# Patient Record
Sex: MALE | Race: OTHER | NOT HISPANIC OR LATINO | URBAN - METROPOLITAN AREA
[De-identification: names, ages, dates, MRNs, and addresses within clinical notes are randomized per-mention and may not be internally consistent; named-entity substitution may affect disease eponyms.]

---

## 2020-12-21 ENCOUNTER — INPATIENT (INPATIENT)
Facility: HOSPITAL | Age: 35
LOS: 0 days | Discharge: HOME | DRG: 164 | End: 2020-12-22
Payer: COMMERCIAL

## 2020-12-21 ENCOUNTER — RESULT REVIEW (OUTPATIENT)
Age: 35
End: 2020-12-21

## 2020-12-21 VITALS
RESPIRATION RATE: 26 BRPM | TEMPERATURE: 97 F | DIASTOLIC BLOOD PRESSURE: 78 MMHG | SYSTOLIC BLOOD PRESSURE: 130 MMHG | HEART RATE: 108 BPM | OXYGEN SATURATION: 97 % | WEIGHT: 309.97 LBS

## 2020-12-21 DIAGNOSIS — I26.99 OTHER PULMONARY EMBOLISM WITHOUT ACUTE COR PULMONALE: ICD-10-CM

## 2020-12-21 DIAGNOSIS — I82.451 ACUTE EMBOLISM AND THROMBOSIS OF RIGHT PERONEAL VEIN: ICD-10-CM

## 2020-12-21 DIAGNOSIS — I82.411 ACUTE EMBOLISM AND THROMBOSIS OF RIGHT FEMORAL VEIN: ICD-10-CM

## 2020-12-21 DIAGNOSIS — I82.431 ACUTE EMBOLISM AND THROMBOSIS OF RIGHT POPLITEAL VEIN: ICD-10-CM

## 2020-12-21 DIAGNOSIS — D68.51 ACTIVATED PROTEIN C RESISTANCE: ICD-10-CM

## 2020-12-21 DIAGNOSIS — R09.89 OTHER SPECIFIED SYMPTOMS AND SIGNS INVOLVING THE CIRCULATORY AND RESPIRATORY SYSTEMS: ICD-10-CM

## 2020-12-21 DIAGNOSIS — I50.811 ACUTE RIGHT HEART FAILURE: ICD-10-CM

## 2020-12-21 LAB
A1C WITH ESTIMATED AVERAGE GLUCOSE RESULT: 6.6 % — HIGH (ref 4–5.6)
ALBUMIN SERPL ELPH-MCNC: 3.6 G/DL — SIGNIFICANT CHANGE UP (ref 3.3–5)
ALBUMIN SERPL ELPH-MCNC: 3.7 G/DL — SIGNIFICANT CHANGE UP (ref 3.3–5)
ALP SERPL-CCNC: 70 U/L — SIGNIFICANT CHANGE UP (ref 40–120)
ALP SERPL-CCNC: 71 U/L — SIGNIFICANT CHANGE UP (ref 40–120)
ALT FLD-CCNC: 56 U/L — HIGH (ref 10–45)
ALT FLD-CCNC: SIGNIFICANT CHANGE UP U/L (ref 10–45)
ANION GAP SERPL CALC-SCNC: 13 MMOL/L — SIGNIFICANT CHANGE UP (ref 5–17)
ANION GAP SERPL CALC-SCNC: 14 MMOL/L — SIGNIFICANT CHANGE UP (ref 5–17)
APTT BLD: 131.1 SEC — CRITICAL HIGH (ref 27.5–35.5)
APTT BLD: 57.2 SEC — HIGH (ref 27.5–35.5)
APTT BLD: 59.6 SEC — HIGH (ref 27.5–35.5)
APTT BLD: 60.1 SEC — HIGH (ref 27.5–35.5)
APTT BLD: >200 SEC — CRITICAL HIGH (ref 27.5–35.5)
AST SERPL-CCNC: 22 U/L — SIGNIFICANT CHANGE UP (ref 10–40)
AST SERPL-CCNC: SIGNIFICANT CHANGE UP U/L (ref 10–40)
BILIRUB SERPL-MCNC: 0.5 MG/DL — SIGNIFICANT CHANGE UP (ref 0.2–1.2)
BILIRUB SERPL-MCNC: 0.5 MG/DL — SIGNIFICANT CHANGE UP (ref 0.2–1.2)
BUN SERPL-MCNC: 7 MG/DL — SIGNIFICANT CHANGE UP (ref 7–23)
BUN SERPL-MCNC: 8 MG/DL — SIGNIFICANT CHANGE UP (ref 7–23)
CALCIUM SERPL-MCNC: 8.6 MG/DL — SIGNIFICANT CHANGE UP (ref 8.4–10.5)
CALCIUM SERPL-MCNC: 8.9 MG/DL — SIGNIFICANT CHANGE UP (ref 8.4–10.5)
CHLORIDE SERPL-SCNC: 103 MMOL/L — SIGNIFICANT CHANGE UP (ref 96–108)
CHLORIDE SERPL-SCNC: 103 MMOL/L — SIGNIFICANT CHANGE UP (ref 96–108)
CO2 SERPL-SCNC: 20 MMOL/L — LOW (ref 22–31)
CO2 SERPL-SCNC: 21 MMOL/L — LOW (ref 22–31)
CREAT SERPL-MCNC: 0.68 MG/DL — SIGNIFICANT CHANGE UP (ref 0.5–1.3)
CREAT SERPL-MCNC: 0.68 MG/DL — SIGNIFICANT CHANGE UP (ref 0.5–1.3)
ESTIMATED AVERAGE GLUCOSE: 143 MG/DL — HIGH (ref 68–114)
GLUCOSE SERPL-MCNC: 145 MG/DL — HIGH (ref 70–99)
GLUCOSE SERPL-MCNC: 147 MG/DL — HIGH (ref 70–99)
HCT VFR BLD CALC: 41.8 % — SIGNIFICANT CHANGE UP (ref 39–50)
HGB BLD-MCNC: 13.4 G/DL — SIGNIFICANT CHANGE UP (ref 13–17)
INR BLD: 1.25 — HIGH (ref 0.88–1.16)
INR BLD: 1.34 — HIGH (ref 0.88–1.16)
MAGNESIUM SERPL-MCNC: 2 MG/DL — SIGNIFICANT CHANGE UP (ref 1.6–2.6)
MAGNESIUM SERPL-MCNC: 2 MG/DL — SIGNIFICANT CHANGE UP (ref 1.6–2.6)
MCHC RBC-ENTMCNC: 26.7 PG — LOW (ref 27–34)
MCHC RBC-ENTMCNC: 32.1 GM/DL — SIGNIFICANT CHANGE UP (ref 32–36)
MCV RBC AUTO: 83.3 FL — SIGNIFICANT CHANGE UP (ref 80–100)
NRBC # BLD: 0 /100 WBCS — SIGNIFICANT CHANGE UP (ref 0–0)
NT-PROBNP SERPL-SCNC: 1957 PG/ML — HIGH (ref 0–300)
PHOSPHATE SERPL-MCNC: 2.9 MG/DL — SIGNIFICANT CHANGE UP (ref 2.5–4.5)
PHOSPHATE SERPL-MCNC: 3.1 MG/DL — SIGNIFICANT CHANGE UP (ref 2.5–4.5)
PLATELET # BLD AUTO: 245 K/UL — SIGNIFICANT CHANGE UP (ref 150–400)
POTASSIUM SERPL-MCNC: 3.8 MMOL/L — SIGNIFICANT CHANGE UP (ref 3.5–5.3)
POTASSIUM SERPL-MCNC: SIGNIFICANT CHANGE UP MMOL/L (ref 3.5–5.3)
POTASSIUM SERPL-SCNC: 3.8 MMOL/L — SIGNIFICANT CHANGE UP (ref 3.5–5.3)
POTASSIUM SERPL-SCNC: SIGNIFICANT CHANGE UP MMOL/L (ref 3.5–5.3)
PROT SERPL-MCNC: 6.7 G/DL — SIGNIFICANT CHANGE UP (ref 6–8.3)
PROT SERPL-MCNC: 7.2 G/DL — SIGNIFICANT CHANGE UP (ref 6–8.3)
PROTHROM AB SERPL-ACNC: 14.8 SEC — HIGH (ref 10.6–13.6)
PROTHROM AB SERPL-ACNC: 15.9 SEC — HIGH (ref 10.6–13.6)
RBC # BLD: 5.02 M/UL — SIGNIFICANT CHANGE UP (ref 4.2–5.8)
RBC # FLD: 13 % — SIGNIFICANT CHANGE UP (ref 10.3–14.5)
SARS-COV-2 RNA SPEC QL NAA+PROBE: SIGNIFICANT CHANGE UP
SODIUM SERPL-SCNC: 137 MMOL/L — SIGNIFICANT CHANGE UP (ref 135–145)
SODIUM SERPL-SCNC: 137 MMOL/L — SIGNIFICANT CHANGE UP (ref 135–145)
TROPONIN T SERPL-MCNC: <0.01 NG/ML — SIGNIFICANT CHANGE UP (ref 0–0.01)
WBC # BLD: 14.27 K/UL — HIGH (ref 3.8–10.5)
WBC # FLD AUTO: 14.27 K/UL — HIGH (ref 3.8–10.5)

## 2020-12-21 PROCEDURE — 93306 TTE W/DOPPLER COMPLETE: CPT | Mod: 26

## 2020-12-21 PROCEDURE — 99223 1ST HOSP IP/OBS HIGH 75: CPT | Mod: GC

## 2020-12-21 PROCEDURE — 93308 TTE F-UP OR LMTD: CPT | Mod: 26,59

## 2020-12-21 PROCEDURE — 37184 PRIM ART M-THRMBC 1ST VSL: CPT

## 2020-12-21 PROCEDURE — 88304 TISSUE EXAM BY PATHOLOGIST: CPT | Mod: 26

## 2020-12-21 PROCEDURE — 93321 DOPPLER ECHO F-UP/LMTD STD: CPT | Mod: 26,59

## 2020-12-21 PROCEDURE — 75743 ARTERY X-RAYS LUNGS: CPT | Mod: 26,59

## 2020-12-21 PROCEDURE — 99233 SBSQ HOSP IP/OBS HIGH 50: CPT | Mod: GC

## 2020-12-21 PROCEDURE — 36014 PLACE CATHETER IN ARTERY: CPT | Mod: 59

## 2020-12-21 RX ORDER — HEPARIN SODIUM 5000 [USP'U]/ML
2400 INJECTION INTRAVENOUS; SUBCUTANEOUS
Qty: 25000 | Refills: 0 | Status: DISCONTINUED | OUTPATIENT
Start: 2020-12-21 | End: 2020-12-21

## 2020-12-21 RX ORDER — HEPARIN SODIUM 5000 [USP'U]/ML
2200 INJECTION INTRAVENOUS; SUBCUTANEOUS
Qty: 25000 | Refills: 0 | Status: DISCONTINUED | OUTPATIENT
Start: 2020-12-21 | End: 2020-12-21

## 2020-12-21 RX ORDER — INFLUENZA VIRUS VACCINE 15; 15; 15; 15 UG/.5ML; UG/.5ML; UG/.5ML; UG/.5ML
0.5 SUSPENSION INTRAMUSCULAR ONCE
Refills: 0 | Status: DISCONTINUED | OUTPATIENT
Start: 2020-12-21 | End: 2020-12-22

## 2020-12-21 RX ORDER — CHLORHEXIDINE GLUCONATE 213 G/1000ML
1 SOLUTION TOPICAL
Refills: 0 | Status: DISCONTINUED | OUTPATIENT
Start: 2020-12-21 | End: 2020-12-21

## 2020-12-21 RX ORDER — ACETAMINOPHEN 500 MG
650 TABLET ORAL ONCE
Refills: 0 | Status: COMPLETED | OUTPATIENT
Start: 2020-12-21 | End: 2020-12-21

## 2020-12-21 RX ORDER — HEPARIN SODIUM 5000 [USP'U]/ML
1800 INJECTION INTRAVENOUS; SUBCUTANEOUS
Qty: 25000 | Refills: 0 | Status: DISCONTINUED | OUTPATIENT
Start: 2020-12-21 | End: 2020-12-22

## 2020-12-21 RX ADMIN — Medication 650 MILLIGRAM(S): at 20:24

## 2020-12-21 RX ADMIN — Medication 650 MILLIGRAM(S): at 21:22

## 2020-12-21 RX ADMIN — HEPARIN SODIUM 22 UNIT(S)/HR: 5000 INJECTION INTRAVENOUS; SUBCUTANEOUS at 08:16

## 2020-12-21 RX ADMIN — HEPARIN SODIUM 22 UNIT(S)/HR: 5000 INJECTION INTRAVENOUS; SUBCUTANEOUS at 19:13

## 2020-12-21 NOTE — H&P ADULT - ASSESSMENT
35M PMH factor V Leiden mutation and PE, previously on Xarelto now off, presented to [place] with complaints of shortness of breath and was found to have a PE, transferred to [facility] for possible thrombectomy.      Neurologic  AAOx3, not sedated, no active issues    Cardiac  #Elevated troponin T  Troponin elevated, likely in the setting of increased myocardial stress due to PE.   - Trend trop, ordered proBNP    Pulmonary  #Submassive PE  Patient with confirmed submassive PE on CT. PMH of PE several years ago, after which patient was found to have factor V mutation and was started on xarelto. Patient was taken off xarelto by his PCP and subsequently developed PE.  - Continue with heparin gtts for now, ptt target for full AC  - NPO for possible thrombectomy in AM  - Echocardiogram and EKG ordered  - F/u ABG  - Patient may end up needing lifelong AC given recurrent PE    Renal  No active issues    GI  No active issues    ID  No active issues    Endocrine   No active issues    F: None  E: Replete PRN  N: NPO for possible thrombectomy  A: MICU    FULL CODE 35M PMH factor V Leiden mutation and PE, previously on Xarelto now off, presented to [place] with complaints of shortness of breath and was found to have a PE, transferred to [facility] for possible thrombectomy.      Neurologic  AAOx3, not sedated, no active issues    Cardiac  #Elevated troponin T  Troponin elevated, likely in the setting of increased myocardial stress due to PE.   - Trend trop, ordered proBNP    Pulmonary  #Submassive PE  Patient with confirmed submassive PE on CT. PMH of PE several years ago, after which patient was found to have factor V mutation and was started on xarelto. Patient was taken off xarelto by his PCP and subsequently developed PE.  - Continue with heparin gtts for now, ptt target for full AC  - NPO for possible thrombectomy in AM  - Echocardiogram and EKG ordered  - F/u ABG  - Patient may end up needing lifelong AC given recurrent PE    Renal  No active issues    GI  No active issues    Heme/Onc  #Factor V mutation  Patient with factor V mutation, family history of such mutation in multiple relatives. Hx of PE previously on Xarelto now off Xarelto and with recurrent PE  - Mgmt as per PE section    ID  No active issues    Endocrine   No active issues    F: None  E: Replete PRN  N: NPO for possible thrombectomy  A: MICU    FULL CODE 35M PMH factor V Leiden mutation and PE, previously on Xarelto now off, presented to [place] with complaints of shortness of breath and was found to have a PE, transferred to [facility] for possible thrombectomy.      Neurologic  AAOx3, not sedated, no active issues    Cardiac  #Right heart strain  Right heart strain noted on echocardiogram and with characteristic EKG findings. proBNP elevated    Pulmonary  #Submassive PE  Patient with confirmed submassive PE on CT. PMH of PE several years ago, after which patient was found to have factor V mutation and was started on xarelto. Patient was taken off xarelto by his PCP and subsequently developed PE.  - Continue with heparin gtts for now, ptt target for full AC  - NPO for possible thrombectomy in AM  - Echocardiogram and EKG ordered  - F/u ABG  - Patient may end up needing lifelong AC given recurrent PE    Renal  No active issues    GI  No active issues    Heme/Onc  #Factor V mutation  Patient with factor V mutation, family history of such mutation in multiple relatives. Hx of PE previously on Xarelto now off Xarelto and with recurrent PE  - Mgmt as per PE section    ID  No active issues    Endocrine   No active issues    F: None  E: Replete PRN  N: NPO for possible thrombectomy  A: MICU    FULL CODE

## 2020-12-21 NOTE — H&P ADULT - NSHPLABSRESULTS_GEN_ALL_CORE
LABS:     137  |  103  |  8   ----------------------------<  147<H>  3.8   |  21<L>  |  0.68    Ca    8.9      21 Dec 2020 01:59  Phos  2.9     12-21  Mg     2.0     12-21    TPro  6.7  /  Alb  3.7  /  TBili  0.5  /  DBili  x   /  AST  22  /  ALT  56<H>  /  AlkPhos  70  12-21    PT/INR - ( 21 Dec 2020 01:59 )   PT: 15.9 sec;   INR: 1.34     PTT - ( 21 Dec 2020 01:59 )  PTT:131.1 sec    CARDIAC MARKERS ( 21 Dec 2020 01:59 )  x     / <0.01 ng/mL / x     / x     / x        Serum Pro-Brain Natriuretic Peptide: 1957 pg/mL (12-21 @ 01:59)      RADIOLOGY, EKG & ADDITIONAL TESTS: EKG reviewed

## 2020-12-21 NOTE — CHART NOTE - NSCHARTNOTEFT_GEN_A_CORE
Limited bedside TTE for PE/RV function  - Normal LV systolic function  - No significant valvular lesions seen  - Flattening of interventricular septum during systole and diastole suggestive of RV pressure and volume overload (D sign)  - RV base hypokinesis with relative RV apical hyperkinesis (McConnells sign)  - No clot in transit was seen  - TAPSE 17.8- borderline RV systolic function  - RA pressure by IVC is 15. PASP is estimated to be 57 (pulmonary hypertension, probably moderate)    F/u attending read in AM. Formal TTE as indicated.  Twin Marshall MD PGY4

## 2020-12-21 NOTE — CONSULT NOTE ADULT - ATTENDING COMMENTS
Patient seen and examined with house-staff during bedside rounds.  Resident note read, including vitals, physical findings, laboratory data, and radiological reports.   Revisions included below.  Direct personal management at bed side and extensive interpretation of the data.  Plan was outlined and discussed in details with the housestaff.  Decision making of high complexity  Action taken for acute disease activity to reflect the level of care provided:  - medication reconciliation  - review laboratory data    The patient case was discussed with the referring facility.  The patient has submassive pulmonary emboli with right ventricular strain acute right heart failure and pulmonary hypertension.  She has genetic mutation  Had a saddle embolus in the past.  Anticoagulation were discontinued.    Echocardiogram was reviewed    Option was discussed with the patient.  Patient had catheter directed thrombectomy with adequate results.  No hematoma at the site.  Continue heparin.  Echocardiogram in the morning.  Patient was seen several times during the day and discussed the case with interventional cardiology

## 2020-12-21 NOTE — CONSULT NOTE ADULT - PROBLEM SELECTOR RECOMMENDATION 2
Factor V leiden as per chart review. will need to be further confirmed by patient's PCP tomorrow AM.     Recommendations:   - agree with monitoring in MICU for now.   - possible thrombectomy plans as per Cardio service. Keep patient NPO  - will need life long anticoagulation. choice of anticoagulation depends on Factor V Leiden mutation if present. Will need to be confirmed with PCP office. Factor V leiden as per chart review. will need to be further confirmed by patient's PCP tomorrow AM.     Recommendations:   - agree with monitoring in MICU for now.   - possible thrombectomy plans as per Cardio service. Keep patient NPO  - will need life long anticoagulation. choice of anticoagulation depends on Factor V Leiden mutation if present. Will need to be confirmed with PCP office.  - cont heparin drip

## 2020-12-21 NOTE — H&P ADULT - NSHPPHYSICALEXAM_GEN_ALL_CORE
VITAL SIGNS:  T(C): 36.2 (12-21-20 @ 00:45), Max: 36.2 (12-21-20 @ 00:45)  T(F): 97.1 (12-21-20 @ 00:45), Max: 97.1 (12-21-20 @ 00:45)  HR: 108 (12-21-20 @ 01:00) (108 - 108)  BP: 102/74 (12-21-20 @ 01:00) (102/74 - 130/78)  BP(mean): 84 (12-21-20 @ 01:00) (84 - 99)  RR: 22 (12-21-20 @ 01:00) (22 - 26)  SpO2: 95% (12-21-20 @ 01:00) (95% - 97%)    PHYSICAL EXAM:  Constitutional: WDWN resting comfortably in bed; NAD  Head: NC/AT  Eyes: PERRL, EOMI, anicteric sclera  ENT: no nasal discharge; uvula midline, no oropharyngeal erythema or exudates; MMM  Neck: supple; no JVD or thyromegaly  Respiratory: CTA B/L; no W/R/R, no retractions, notable tachypnea   Cardiac: +S1/S2; RRR; no M/R/G; PMI non-displaced  Gastrointestinal: abdomen soft, NT/ND; no rebound or guarding; +BSx4  Back: spine midline, no bony tenderness or step-offs; no CVAT B/L  Extremities: WWP, no clubbing or cyanosis; no peripheral edema  Musculoskeletal: NROM x4; no joint swelling, tenderness or erythema  Vascular: 2+ radial, femoral, DP/PT pulses B/L  Dermatologic: skin warm, dry and intact; no rashes, wounds, or scars  Lymphatic: no submandibular or cervical LAD  Neurologic: AAOx3; CNII-XII grossly intact; no focal deficits  Psychiatric: affect and characteristics of appearance, verbalizations, behaviors are appropriate

## 2020-12-21 NOTE — CONSULT NOTE ADULT - SUBJECTIVE AND OBJECTIVE BOX
PULMONARY SERVICE INITIAL CONSULT NOTE    As per chart review, the patient is a 35 yr old male who has been transferred from [facility] ER after he was found to have submassive saddle embolism on CT chest for possible intervention. Patient presented to [place] ER with c/c of syncope on standing up yesterday, progressive dyspnea and chest pain x 1day duration. He denies any COVID symptoms (fever/chills/anosmia etc).   Of note, patient has hx of PE in 2016 and was found to have ? factor V leiden mutation and was on anticoagulation for 2 years. no other chronic medical problems.     On my evaluation, patient is in MICU and sleeping on room air saturating 92-95%. He has sinus tachycardia (100-110) and MAP ranging from 100- 80.       REVIEW OF SYSTEMS:  Constitutional: No fever, weight loss or fatigue  Eyes: No eye pain, visual disturbances, or discharge  ENMT:  No difficulty hearing, tinnitus, vertigo; No sinus or throat pain  Neck: No pain, stiffness or neck swelling  Respiratory: see HPI  Cardiovascular: No chest pain, palpitations, dizziness or leg swelling  Gastrointestinal: No abdominal or epigastric pain. No nausea, vomiting or hematemesis; No diarrhea or constipation. No melena or hematochezia.  Genitourinary: No dysuria, frequency, hematuria or incontinence  Neurological: No headaches, memory loss, loss of strength, numbness or tremors  Skin: No itching, burning, rashes or lesions   Lymph Nodes: No enlarged glands  Endocrine: No heat or cold intolerance; No hair loss  Musculoskeletal: No joint pain or swelling; No muscle, back or extremity pain  Psychiatric: No depression, anxiety, mood swings or difficulty sleeping  Heme/Lymph: No easy bruising or bleeding gums  Allergy and Immunologic: No hives or eczema    PAST MEDICAL & SURGICAL HISTORY:  Pulmonary embolism    No significant past surgical history        FAMILY HISTORY:      SOCIAL HISTORY:  Smoking Status: [ ] Current, [ ] Former, [x] Never  Pack Years:    MEDICATIONS:  Pulmonary:    Antimicrobials:    Anticoagulants:  heparin  Infusion 2200 Unit(s)/Hr IV Continuous <Continuous>    Onc:    GI/:    Endocrine:    Cardiac:    Other Medications:      Allergies    No Known Allergies    Intolerances        Vital Signs Last 24 Hrs  T(C): 36.2 (21 Dec 2020 00:45), Max: 36.2 (21 Dec 2020 00:45)  T(F): 97.1 (21 Dec 2020 00:45), Max: 97.1 (21 Dec 2020 00:45)  HR: 109 (21 Dec 2020 05:00) (107 - 109)  BP: 120/68 (21 Dec 2020 05:00) (102/74 - 149/76)  BP(mean): 86 (21 Dec 2020 05:00) (84 - 105)  RR: 20 (21 Dec 2020 04:00) (18 - 26)  SpO2: 91% (21 Dec 2020 05:00) (91% - 97%)        PHYSICAL EXAM:  Constitutional: WDWN  Head: NC/AT  EENT: PERRL, anicteric sclera; oropharynx clear, MMM  Neck: supple, no appreciable JVD  Respiratory: CTA B/L; no W/R/R  Cardiovascular: +S1/S2, RRR  Gastrointestinal: soft, NT/ND; +BSx4  Extremities: WWP; no edema, clubbing or cyanosis  Vascular: 2+ radial, DP/PT pulses B/L  Neurological: AAOx3; no focal deficits    LABS:        12-21    137  |  103  |  8   ----------------------------<  147<H>  3.8   |  21<L>  |  0.68    Ca    8.9      21 Dec 2020 01:59  Phos  2.9     12-21  Mg     2.0     12-21    TPro  6.7  /  Alb  3.7  /  TBili  0.5  /  DBili  x   /  AST  22  /  ALT  56<H>  /  AlkPhos  70  12-21    PT/INR - ( 21 Dec 2020 01:59 )   PT: 15.9 sec;   INR: 1.34          PTT - ( 21 Dec 2020 01:59 )  PTT:131.1 sec                  RADIOLOGY & ADDITIONAL STUDIES:

## 2020-12-21 NOTE — CONSULT NOTE ADULT - SUBJECTIVE AND OBJECTIVE BOX
35M PMH factor V leiden, PE/DVT in 2016 (previously on xarelto) transferred from [place] for PE. Was USOH, developed nausea/cough 3d ago, and SOB 2d ago (ET now: 5 steps). 1 episode of syncope after taking a shower, 30s LOC, no head injury or confusion afterwards. Denies CP, sore throat/runny nose. Denies smoking. Is sedentary. CT at [place] shows submassive saddle PE.    HPI:  35M PMH factor V Leiden mutation and PE, previously on Xarelto now off, presented to [place] with complaints of shortness of breath and dizziness both worse with exertion. Patient also reports syncopal episode on 12/19 associated with chest tigntness after he stood to walk. Patient reports significant dyspnea with exertion and reports he becomes unable to walk more than a few steps due to shortness of breath. Of note, the patient reports he had a negative COVID test as part of his job on day prior to admission, and denies fever, chills, myalgia or other B symptoms. Patient reports he has low level chest pain, described as pressing and 3/10. CT with PE protocol was performed at [place] and the patient was found to have a submassive saddle PE. Subsequently patient started on heparin gtts and was transferred from [place] to [facility] for possible thrombectomy vs cathiter directed TPA.  (21 Dec 2020 01:05)    PAST MEDICAL & SURGICAL HISTORY:  Pulmonary embolism    No significant past surgical history      ALLERGIES/INTOLERANCES:  No Known Allergies    HOME MEDICATIONS:    INPATIENT MEDICATIONS:    heparin  Infusion 2400 Unit(s)/Hr IV Continuous <Continuous>        REVIEW OF SYSTEMS:    [ ] All other review of systems are negative unless indicated above.  [ ] Unable to obtain due to:    PHYSICAL EXAM:    T(C): 36.2 (12-21-20 @ 00:45), Max: 36.2 (12-21-20 @ 00:45)  HR: 109 (12-21-20 @ 02:00) (108 - 109)  BP: 144/68 (12-21-20 @ 02:00) (102/74 - 144/68)  RR: 22 (12-21-20 @ 01:00) (22 - 26)  SpO2: 96% (12-21-20 @ 02:00) (95% - 97%)  Wt(kg): --    I&O's Summary  NAD  Regular, tachy  CTAB  No edema          ASSESSMENT/PLAN: 35M PMH factor V leiden, PE/DVT in 2016 (previously on xarelto now off) transferred from [place] for submassive PE. Bedside TTE shows RV strain. Currently tachycardic, but normotensive, nonlabored breathing. ProBNP 2k.    #PE: submassive with RV strain  - hep gtt  - NPO. Plan for aspiration thrombectomy in AM with Dr. Gonzalez. Consent in chart.  - LE duplex    **Preliminary evaluation by on call cardiology fellow  Twin Marshall MD PGY4     35M PMH factor V leiden, PE/DVT in 2016 (previously on xarelto) transferred from [place] for PE. Was USOH, developed nausea/cough 3d ago, and SOB 2d ago (ET now: 5 steps). 1 episode of syncope after taking a shower, 30s LOC, no head injury or confusion afterwards. Denies CP, sore throat/runny nose. Denies smoking. Is sedentary. CT at [place] shows submassive saddle PE.    HPI:  35M PMH factor V Leiden mutation and PE, previously on Xarelto now off, presented to [place] with complaints of shortness of breath and dizziness both worse with exertion. Patient also reports syncopal episode on 12/19 associated with chest tigntness after he stood to walk. Patient reports significant dyspnea with exertion and reports he becomes unable to walk more than a few steps due to shortness of breath. Of note, the patient reports he had a negative COVID test as part of his job on day prior to admission, and denies fever, chills, myalgia or other B symptoms. Patient reports he has low level chest pain, described as pressing and 3/10. CT with PE protocol was performed at [place] and the patient was found to have a submassive saddle PE. Subsequently patient started on heparin gtts and was transferred from [place] to [facility] for possible thrombectomy vs cathiter directed TPA.  (21 Dec 2020 01:05)    PAST MEDICAL & SURGICAL HISTORY:  Pulmonary embolism    No significant past surgical history      ALLERGIES/INTOLERANCES:  No Known Allergies    HOME MEDICATIONS:    INPATIENT MEDICATIONS:    heparin  Infusion 2400 Unit(s)/Hr IV Continuous <Continuous>        REVIEW OF SYSTEMS:    [ ] All other review of systems are negative unless indicated above.  [ ] Unable to obtain due to:    PHYSICAL EXAM:    T(C): 36.2 (12-21-20 @ 00:45), Max: 36.2 (12-21-20 @ 00:45)  HR: 109 (12-21-20 @ 02:00) (108 - 109)  BP: 144/68 (12-21-20 @ 02:00) (102/74 - 144/68)  RR: 22 (12-21-20 @ 01:00) (22 - 26)  SpO2: 96% (12-21-20 @ 02:00) (95% - 97%)  Wt(kg): --    I&O's Summary  NAD  Regular, tachy  CTAB  No edema          ASSESSMENT/PLAN: 35M PMH factor V leiden, PE/DVT in 2016 (previously on xarelto now off) transferred from [place] for submassive PE. Bedside TTE shows RV strain. Currently tachycardic, but normotensive, nonlabored breathing, on room air. ProBNP 2k.    #PE: submassive with RV strain  - hep gtt  - NPO. Plan for aspiration thrombectomy in AM with Dr. Gonzalez. Consent in chart. Covid neg at [place], awaiting repeat.  - LE duplex    **Preliminary evaluation by on call cardiology fellow  Twin Marshall MD PGY4

## 2020-12-21 NOTE — CONSULT NOTE ADULT - ASSESSMENT
35 yr old male with 2nd episode of unprovoked submassive PE with hx of Factor V leiden mutation (?).

## 2020-12-22 ENCOUNTER — TRANSCRIPTION ENCOUNTER (OUTPATIENT)
Age: 35
End: 2020-12-22

## 2020-12-22 VITALS
OXYGEN SATURATION: 96 % | DIASTOLIC BLOOD PRESSURE: 60 MMHG | RESPIRATION RATE: 34 BRPM | SYSTOLIC BLOOD PRESSURE: 132 MMHG | HEART RATE: 103 BPM

## 2020-12-22 LAB
ANION GAP SERPL CALC-SCNC: 12 MMOL/L — SIGNIFICANT CHANGE UP (ref 5–17)
APTT BLD: 34.2 SEC — SIGNIFICANT CHANGE UP (ref 27.5–35.5)
BUN SERPL-MCNC: 10 MG/DL — SIGNIFICANT CHANGE UP (ref 7–23)
CALCIUM SERPL-MCNC: 9.1 MG/DL — SIGNIFICANT CHANGE UP (ref 8.4–10.5)
CHLORIDE SERPL-SCNC: 104 MMOL/L — SIGNIFICANT CHANGE UP (ref 96–108)
CO2 SERPL-SCNC: 23 MMOL/L — SIGNIFICANT CHANGE UP (ref 22–31)
CREAT SERPL-MCNC: 0.69 MG/DL — SIGNIFICANT CHANGE UP (ref 0.5–1.3)
GLUCOSE SERPL-MCNC: 132 MG/DL — HIGH (ref 70–99)
HCT VFR BLD CALC: 43.5 % — SIGNIFICANT CHANGE UP (ref 39–50)
HGB BLD-MCNC: 13.4 G/DL — SIGNIFICANT CHANGE UP (ref 13–17)
INR BLD: 1.14 — SIGNIFICANT CHANGE UP (ref 0.88–1.16)
MAGNESIUM SERPL-MCNC: 2.3 MG/DL — SIGNIFICANT CHANGE UP (ref 1.6–2.6)
MCHC RBC-ENTMCNC: 25.8 PG — LOW (ref 27–34)
MCHC RBC-ENTMCNC: 30.8 GM/DL — LOW (ref 32–36)
MCV RBC AUTO: 83.7 FL — SIGNIFICANT CHANGE UP (ref 80–100)
NRBC # BLD: 0 /100 WBCS — SIGNIFICANT CHANGE UP (ref 0–0)
PHOSPHATE SERPL-MCNC: 3.7 MG/DL — SIGNIFICANT CHANGE UP (ref 2.5–4.5)
PLATELET # BLD AUTO: 214 K/UL — SIGNIFICANT CHANGE UP (ref 150–400)
POTASSIUM SERPL-MCNC: 4.3 MMOL/L — SIGNIFICANT CHANGE UP (ref 3.5–5.3)
POTASSIUM SERPL-SCNC: 4.3 MMOL/L — SIGNIFICANT CHANGE UP (ref 3.5–5.3)
PROTHROM AB SERPL-ACNC: 13.6 SEC — SIGNIFICANT CHANGE UP (ref 10.6–13.6)
RBC # BLD: 5.2 M/UL — SIGNIFICANT CHANGE UP (ref 4.2–5.8)
RBC # FLD: 12.8 % — SIGNIFICANT CHANGE UP (ref 10.3–14.5)
SODIUM SERPL-SCNC: 139 MMOL/L — SIGNIFICANT CHANGE UP (ref 135–145)
WBC # BLD: 11.22 K/UL — HIGH (ref 3.8–10.5)
WBC # FLD AUTO: 11.22 K/UL — HIGH (ref 3.8–10.5)

## 2020-12-22 PROCEDURE — 99233 SBSQ HOSP IP/OBS HIGH 50: CPT | Mod: GC

## 2020-12-22 PROCEDURE — 99238 HOSP IP/OBS DSCHRG MGMT 30/<: CPT | Mod: GC

## 2020-12-22 PROCEDURE — 93308 TTE F-UP OR LMTD: CPT | Mod: 26

## 2020-12-22 PROCEDURE — 93970 EXTREMITY STUDY: CPT | Mod: 26

## 2020-12-22 RX ORDER — ACETAMINOPHEN 500 MG
650 TABLET ORAL ONCE
Refills: 0 | Status: COMPLETED | OUTPATIENT
Start: 2020-12-22 | End: 2020-12-22

## 2020-12-22 RX ORDER — APIXABAN 2.5 MG/1
2 TABLET, FILM COATED ORAL
Qty: 66 | Refills: 0
Start: 2020-12-22 | End: 2020-12-27

## 2020-12-22 RX ORDER — APIXABAN 2.5 MG/1
10 TABLET, FILM COATED ORAL EVERY 12 HOURS
Refills: 0 | Status: DISCONTINUED | OUTPATIENT
Start: 2020-12-22 | End: 2020-12-22

## 2020-12-22 RX ORDER — HEPARIN SODIUM 5000 [USP'U]/ML
2300 INJECTION INTRAVENOUS; SUBCUTANEOUS
Qty: 25000 | Refills: 0 | Status: DISCONTINUED | OUTPATIENT
Start: 2020-12-22 | End: 2020-12-22

## 2020-12-22 RX ORDER — HEPARIN SODIUM 5000 [USP'U]/ML
2200 INJECTION INTRAVENOUS; SUBCUTANEOUS
Qty: 25000 | Refills: 0 | Status: DISCONTINUED | OUTPATIENT
Start: 2020-12-22 | End: 2020-12-22

## 2020-12-22 RX ADMIN — Medication 650 MILLIGRAM(S): at 08:22

## 2020-12-22 RX ADMIN — APIXABAN 10 MILLIGRAM(S): 2.5 TABLET, FILM COATED ORAL at 08:38

## 2020-12-22 RX ADMIN — Medication 650 MILLIGRAM(S): at 05:56

## 2020-12-22 NOTE — PROGRESS NOTE ADULT - SUBJECTIVE AND OBJECTIVE BOX
INTERVAL HISTORY:  Patient seen and examined at bedside. Feels well and breathing has improved. Groin sheath is giving him some discomfort but no pain.     VITAL SIGNS:  ICU Vital Signs Last 24 Hrs  T(C): 37 (22 Dec 2020 09:44), Max: 37 (21 Dec 2020 10:05)  T(F): 98.6 (22 Dec 2020 09:44), Max: 98.6 (21 Dec 2020 10:05)  HR: 90 (22 Dec 2020 09:00) (90 - 109)  BP: 109/66 (22 Dec 2020 09:00) (99/57 - 122/67)  BP(mean): 80 (22 Dec 2020 09:00) (73 - 90)  ABP: --  ABP(mean): --  RR: 24 (22 Dec 2020 09:00) (20 - 26)  SpO2: 95% (22 Dec 2020 09:00) (91% - 99%)        12-21 @ 07:01  -  12-22 @ 07:00  --------------------------------------------------------  IN: 430 mL / OUT: 800 mL / NET: -370 mL      CAPILLARY BLOOD GLUCOSE      PHYSICAL EXAM:  Constitutional: resting comfortably in bed, NAD  HEENT: NC/AT; PERRL, anicteric sclera; no oropharyngeal erythema or exudates; MMM  Neck: supple, no JVD  Respiratory: CTA B/L, no W/R/R; respirations appear non-labored, speaking full sentences  Cardiovascular: +S1/S2, RRR  Gastrointestinal: abdomen soft, NT/ND; +BS x4  Extremities: WWP; no clubbing, cyanosis or edema  Vascular: 2+ radial, DP/PT and femoral pulses B/L      MEDICATIONS:  MEDICATIONS  (STANDING):  apixaban 10 milliGRAM(s) Oral every 12 hours  influenza   Vaccine 0.5 milliLiter(s) IntraMuscular once    MEDICATIONS  (PRN):      ALLERGIES:  Allergies    No Known Allergies    Intolerances        LABS:                        13.4   11.22 )-----------( 214      ( 22 Dec 2020 05:26 )             43.5     12-22    139  |  104  |  10  ----------------------------<  132<H>  4.3   |  23  |  0.69    Ca    9.1      22 Dec 2020 05:26  Phos  3.7     12-22  Mg     2.3     12-22    TPro  7.2  /  Alb  3.6  /  TBili  0.5  /  DBili  x   /  AST  See note  /  ALT  See note  /  AlkPhos  71  12-21    PT/INR - ( 22 Dec 2020 05:26 )   PT: 13.6 sec;   INR: 1.14          PTT - ( 22 Dec 2020 05:26 )  PTT:34.2 sec      RADIOLOGY & ADDITIONAL TESTS:   No new imaging
Overnight Events: pt NPO for AM thrombectomy, ALESSANDRA     Subjective: Patient seen and examined at bedside. Pt has no complaints and currently satting 94% on RA resting comfortably. Denies dizziness, lightheadedness, CP, SOB, Cough, Hemoptysis Abd pain, diarrhea, calf pain.    [OBJECTIVE]:    Vital Signs:  T(F): , Max: 99 (12-21-20 @ 06:03)  HR:  (97 - 115)  BP:  (102/74 - 149/76)  BP(mean):  (82 - 105)  RR:  (18 - 26)  SpO2:  (91% - 97%)  Wt(kg): --  CVP(cm H2O): --      12-20 @ 07:01  -  12-21 @ 07:00  --------------------------------------------------------  IN: 88 mL / OUT: 425 mL / NET: -337 mL    12-21 @ 07:01  -  12-21 @ 10:34  --------------------------------------------------------  IN: 44 mL / OUT: 0 mL / NET: 44 mL      CAPILLARY BLOOD GLUCOSE          Physcial Exam:  T(F): 98.6 (12-21-20 @ 10:05)  HR: 97 (12-21-20 @ 09:00)  BP: 118/64 (12-21-20 @ 09:00)  RR: 24 (12-21-20 @ 09:00)  SpO2: 94% (12-21-20 @ 09:00)  Wt(kg): --    General: AO x 3, NAD  HEENT: PERRL/ EOMI, no scleral icterus, no ptosis, MMM, no JVD, no thyromegaly  Respiratory: CTA b/l, no wheezes, rales or rhonchi  Cardiovascular: Regular, +S1 + S2, S3  Abdomen: Soft, NTND, normoactive bowel sounds, no rebound, no guarding, no suprapubic tenderness  Extremities: No cyanosis, no clubbing, no edema, pulses equal, no calf tenderness  Skin: No rashes  Lymphatic: No cervical/supraclavicular LAD  Neurological: CN II-XII grossly intact, follows commands, moves all extremities    Medications:  MEDICATIONS  (STANDING):  heparin  Infusion 2200 Unit(s)/Hr (22 mL/Hr) IV Continuous <Continuous>  influenza   Vaccine 0.5 milliLiter(s) IntraMuscular once    MEDICATIONS  (PRN):      Allergies:  Allergies    No Known Allergies    Intolerances        Labs:                        13.4   14.27 )-----------( 245      ( 21 Dec 2020 07:51 )             41.8     12-21    137  |  103  |  7   ----------------------------<  145<H>  See note   |  20<L>  |  0.68    Ca    8.6      21 Dec 2020 07:51  Phos  3.1     12-21  Mg     2.0     12-21    TPro  7.2  /  Alb  3.6  /  TBili  0.5  /  DBili  x   /  AST  See note  /  ALT  See note  /  AlkPhos  71  12-21    PT/INR - ( 21 Dec 2020 08:31 )   PT: 14.8 sec;   INR: 1.25          PTT - ( 21 Dec 2020 08:31 )  PTT:60.1 sec      Radiology and other tests:

## 2020-12-22 NOTE — DISCHARGE NOTE PROVIDER - CARE PROVIDER_API CALL
Marcella Scott)  Critical Care Medicine; Pulmonary Disease  [address] [place] [address]  Phone: [phone]  Fax: [phone]  Follow Up Time:

## 2020-12-22 NOTE — PROGRESS NOTE ADULT - I WAS PHYSICALLY PRESENT FOR THE KEY PORTIONS OF THE EVALUATION AND MANAGEMENT (E/M) SERVICE PROVIDED.  I AGREE WITH THE ABOVE HISTORY, PHYSICAL, AND PLAN WHICH I HAVE REVIEWED AND EDITED WHERE APPROPRIATE
Patient tolerated injection well he was d/c from unit in stable condition
Statement Selected
Statement Selected

## 2020-12-22 NOTE — DISCHARGE NOTE PROVIDER - NSDCMRMEDTOKEN_GEN_ALL_CORE_FT
apixaban 5 mg oral tablet: 2 tab(s) orally every 12 hours for the first 6 days and then 1 tablet every 12 hours thereafter.

## 2020-12-22 NOTE — DISCHARGE NOTE PROVIDER - DETAILS OF MALNUTRITION DIAGNOSIS/DIAGNOSES
This patient has been assessed with a concern for Malnutrition and was treated during this hospitalization for the following Nutrition diagnosis/diagnoses:     -  12/22/2020: Morbid obesity (BMI > 40)   This patient has been assessed with a concern for Malnutrition and was treated during this hospitalization for the following Nutrition diagnosis/diagnoses:     -  12/22/2020: Morbid obesity (BMI > 40)    This patient has been assessed with a concern for Malnutrition and was treated during this hospitalization for the following Nutrition diagnosis/diagnoses:     -  12/22/2020: Morbid obesity (BMI > 40)   This patient has been assessed with a concern for Malnutrition and was treated during this hospitalization for the following Nutrition diagnosis/diagnoses:     -  12/22/2020: Morbid obesity (BMI > 40)    This patient has been assessed with a concern for Malnutrition and was treated during this hospitalization for the following Nutrition diagnosis/diagnoses:     -  12/22/2020: Morbid obesity (BMI > 40)    This patient has been assessed with a concern for Malnutrition and was treated during this hospitalization for the following Nutrition diagnosis/diagnoses:     -  12/22/2020: Morbid obesity (BMI > 40)   This patient has been assessed with a concern for Malnutrition and was treated during this hospitalization for the following Nutrition diagnosis/diagnoses:     -  12/22/2020: Morbid obesity (BMI > 40)    This patient has been assessed with a concern for Malnutrition and was treated during this hospitalization for the following Nutrition diagnosis/diagnoses:     -  12/22/2020: Morbid obesity (BMI > 40)    This patient has been assessed with a concern for Malnutrition and was treated during this hospitalization for the following Nutrition diagnosis/diagnoses:     -  12/22/2020: Morbid obesity (BMI > 40)    This patient has been assessed with a concern for Malnutrition and was treated during this hospitalization for the following Nutrition diagnosis/diagnoses:     -  12/22/2020: Morbid obesity (BMI > 40)

## 2020-12-22 NOTE — DISCHARGE NOTE NURSING/CASE MANAGEMENT/SOCIAL WORK - PATIENT PORTAL LINK FT
You can access the FollowMyHealth Patient Portal offered by [facility] by registering at the following website: [email or web address]. By joining [facility]’s FollowMyHealth portal, you will also be able to view your health information using other applications (apps) compatible with our system.

## 2020-12-22 NOTE — PROGRESS NOTE ADULT - PROBLEM SELECTOR PLAN 1
Unprovoked in the setting of Factor V Leiden deficiency. He had been on xarelto before for another unprovoked PE in the past, but discontinued it in the outpatient setting. Unprovoked in the setting of Factor V Leiden deficiency. He had been on xarelto before for another unprovoked PE in the past, but discontinued it in the outpatient setting. With this recurrent episode of PE, likely needs lifelong anticoagulation  Once sheath is removed and hemostasis achieved, can transition from Heparin to eliquis  s/p mechanical thrombectomy with Inari. Oxygenation and PA pressures have improved since then.

## 2020-12-22 NOTE — DISCHARGE NOTE PROVIDER - HOSPITAL COURSE
35M PMH factor V Leiden mutation and PE, previously on Xarelto now off, presented to [place] with complaints of shortness of breath and was found to have a PE, transferred to [facility] for possible thrombectomy.      Problem List/Main Diagnoses (system-based):   #Saddle Pulmonary Embolism        Inpatient treatment course:   New medications:   Labs to be followed outpatient:   Exam to be followed outpatient:    35M PMH factor V Leiden mutation and PE, previously on Xarelto now off, presented to [place] with complaints of shortness of breath and was found to have a submassive PE, transferred to [facility] for thrombectomy. Pt admitted to MICU for scheduled thrombectomy which     Problem List/Main Diagnoses (system-based):   #Saddle Pulmonary Embolism  35M PMH factor V Leiden mutation and PE, previously on Xarelto now off, presented to [place] with complaints of shortness of breath and was found to have a PE, transferred to [facility] for possible thrombectomy.      Neurologic  AAOx3, not sedated, no active issues    Cardiac  #Right heart strain  Right heart strain noted on echocardiogram and with characteristic EKG findings. proBNP elevated    Pulmonary  #Submassive PE  Patient with confirmed submassive PE on CT. PMH of PE several years ago, after which patient was found to have factor V mutation and was started on xarelto. Patient was taken off xarelto by his PCP and subsequently developed PE.  - On CT PE evidence of chronic clots c/b intermediate PE  - Continue with heparin gtts for now, ptt target for full AC  - NPO for thrombectomy today   - EKG S1, Q3, T3 synonymous with PE  - bedside echo Dilated right ventricular size, Reduced right ventricular systolic function  - Pt will need life long AC given recurrent PE and factor V leiden def    Renal  No active issues    GI  No active issues      No active issues    Heme/Onc  #Factor V mutation  Patient with factor V mutation, family history of such mutation in multiple relatives. Hx of PE previously on Xarelto now off Xarelto and with recurrent PE  - Mgmt as per PE section    ID  No active issues    Endocrine   No active issues    F: None  E: Replete PRN  N: NPO for possible thrombectomy  A: MICU          Inpatient treatment course:   New medications:   Labs to be followed outpatient:   Exam to be followed outpatient:    35M PMH factor V Leiden mutation and PE, previously on Xarelto now off, presented to [place] with complaints of shortness of breath and was found to have a submassive PE, transferred to [facility] for thrombectomy. Pt admitted to MICU for scheduled thrombectomy which     Problem List/Main Diagnoses (system-based):     #Right heart strain  Right heart strain noted on echocardiogram and with characteristic EKG findings  - repeat echo with persistently elevated PA pressures with reduced RV function.     Pulmonary  #Submassive PE  Patient with confirmed submassive PE on CT. PMH of PE several years ago, after which patient was found to have factor V mutation and was started on xarelto. Patient was taken off xarelto by his PCP and subsequently developed PE.  - On CT PE evidence of chronic clots c/b intermediate PE  - s/p thrombectomy  - heparin ggt transitioned to Eliquis PO  - EKG S1, Q3, T3 synonymous with PE  - bedside echo Dilated right ventricular size, Reduced right ventricular systolic function. Repeat echocardiogram with elevated PA pressures.   - Pt will need life long AC given recurrent PE and factor V leiden def      #Factor V mutation  Patient with factor V mutation, family history of such mutation in multiple relatives. Hx of PE previously on Xarelto now off Xarelto and with recurrent PE  - Mgmt as per PE section      Inpatient treatment course: Pt was admitted to medical intensive care unit and placed on heparin ggt and underwent subsequent thrombectomy by interventional cardiology. Pt tolerated procedure well and was transitioned to Eliquis DVT/PE   New medications: Eliquis 10mg BID for first 7 days and 5mg BID thereafter.   Labs to be followed outpatient: n/a  Exam to be followed outpatient: echocardiogram    35M PMH factor V Leiden mutation and PE, previously on Xarelto now off, presented to [place] with complaints of shortness of breath and was found to have a submassive PE, transferred to [facility] for thrombectomy. Pt admitted to MICU for scheduled thrombectomy which     Problem List/Main Diagnoses (system-based):     #Right heart strain  Right heart strain noted on echocardiogram and with characteristic EKG findings  - repeat echo with persistently elevated PA pressures with reduced RV function.     Pulmonary  #Submassive PE  Patient with confirmed submassive PE on CT. PMH of PE several years ago, after which patient was found to have factor V mutation and was started on xarelto. Patient was taken off xarelto by his PCP and subsequently developed PE.  - On CT PE evidence of chronic clots c/b intermediate PE  - s/p thrombectomy  - heparin ggt transitioned to Eliquis PO  - EKG S1, Q3, T3 synonymous with PE  - bedside echo Dilated right ventricular size, Reduced right ventricular systolic function. Repeat echocardiogram with elevated PA pressures.   - Pt will need life long AC given recurrent PE and factor V leiden def    #DVT  Pt had dopplers of LE bilateral demonstrating extensive clots of entire RLE.     #Factor V mutation  Patient with factor V mutation, family history of such mutation in multiple relatives. Hx of PE previously on Xarelto now off Xarelto and with recurrent PE  - Mgmt as per PE section      Inpatient treatment course: Pt was admitted to medical intensive care unit and placed on heparin ggt and underwent subsequent thrombectomy by interventional cardiology. Pt tolerated procedure well and was transitioned to Eliquis DVT/PE   New medications: Eliquis 10mg BID for first 7 days and 5mg BID thereafter.   Labs to be followed outpatient: n/a  Exam to be followed outpatient: echocardiogram    35M PMH factor V Leiden mutation and PE, previously on Xarelto now off, presented to [place] with complaints of shortness of breath and was found to have a submassive PE, transferred to [facility] for thrombectomy. Pt admitted to MICU for scheduled thrombectomy which     Problem List/Main Diagnoses (system-based):     #Right heart strain  Right heart strain noted on echocardiogram and with characteristic EKG findings  - repeat echo with persistently elevated PA pressures with reduced RV function.     Pulmonary  #Submassive PE  Patient with confirmed submassive PE on CT. PMH of PE several years ago, after which patient was found to have factor V mutation and was started on xarelto. Patient was taken off xarelto by his PCP and subsequently developed PE.  - On CT PE evidence of chronic clots c/b intermediate PE  - s/p thrombectomy  - heparin ggt transitioned to Eliquis PO  - EKG S1, Q3, T3 synonymous with PE  - bedside echo Dilated right ventricular size, Reduced right ventricular systolic function. Repeat echocardiogram with elevated PA pressures.   - Pt will need life long AC given recurrent PE and factor V leiden def    #DVT  Pt had dopplers of LE bilateral demonstrating extensive clots of entire RLE. Awaiting Vascular input for need for further intervention pending discharge.     #Factor V mutation  Patient with factor V mutation, family history of such mutation in multiple relatives. Hx of PE previously on Xarelto now off Xarelto and with recurrent PE  - Mgmt as per PE section      Inpatient treatment course: Pt was admitted to medical intensive care unit and placed on heparin ggt and underwent subsequent thrombectomy by interventional cardiology. Pt tolerated procedure well and was transitioned to Eliquis DVT/PE   New medications: Eliquis 10mg BID for first 7 days and 5mg BID thereafter.   Labs to be followed outpatient: n/a  Exam to be followed outpatient: echocardiogram    35M PMH factor V Leiden mutation and PE, previously on Xarelto now off, presented to [place] with complaints of shortness of breath and was found to have a submassive PE, transferred to [facility] for thrombectomy. Pt admitted to MICU for scheduled thrombectomy which     Problem List/Main Diagnoses (system-based):     #Right heart strain  Right heart strain noted on echocardiogram and with characteristic EKG findings  - repeat echo with persistently elevated PA pressures with reduced RV function.     Pulmonary  #Submassive PE  Patient with confirmed submassive PE on CT. PMH of PE several years ago, after which patient was found to have factor V mutation and was started on xarelto. Patient was taken off xarelto by his PCP and subsequently developed PE. On  admission, EKG S1, Q3, T3 synonymous with PE  - On CT PE evidence of chronic clots c/b intermediate PE  - 12/21 thrombectomy removing large clot burden b/l  - bedside echo Dilated right ventricular size, Reduced right ventricular systolic function. Repeat echocardiogram with elevated PA pressures.   - Pt will need life long AC given recurrent PE and factor V leiden def - being discharged on Eliquis     #DVT  - Pt had dopplers of LE bilateral demonstrating extensive clots of entire RLE.   - Per vascular    #Factor V mutation  Patient with factor V mutation, family history of such mutation in multiple relatives. Hx of PE previously on Xarelto now off Xarelto and with recurrent PE  - Mgmt as per PE section      Inpatient treatment course: Pt was admitted to medical intensive care unit and placed on heparin ggt and underwent subsequent thrombectomy by interventional cardiology. Pt tolerated procedure well and was transitioned to Eliquis DVT/PE   New medications: Eliquis 10mg BID for first 7 days and 5mg BID thereafter.   Labs to be followed outpatient: n/a  Exam to be followed outpatient: echocardiogram

## 2020-12-22 NOTE — PROGRESS NOTE ADULT - ATTENDING COMMENTS
Patient seen and examined with house-staff during bedside rounds.  Resident note read, including vitals, physical findings, laboratory data, and radiological reports.   Revisions included below.  Direct personal management at bed side and extensive interpretation of the data.  Plan was outlined and discussed in details with the housestaff.  Decision making of high complexity  Action taken for acute disease activity to reflect the level of care provided:  - medication reconciliation  - review laboratory data  Patient is clinically stable.  Repeat echocardiogram was reviewed.  The venous Doppler showed DVT.  The patient is on anticoagulation.  Patient ambulated with no desaturation.  Discussed the case in details with the patient and the mother.  Patient is medically clear for discharge and will follow up as an outpatient.  I discussed the case with his hematologist and the patient had repeat CT scan in 2017 which showed only peripheral pulmonary emboli with no involvement of the main pulmonary  arteries.  Patient was noncompliant for follow-up.

## 2020-12-22 NOTE — DIETITIAN INITIAL EVALUATION ADULT. - OTHER INFO
36 yo/male with PMhx Factor V Leiden and prior PE, admitted to OSH w/syncope and SOB. Found on CT PE to have submassive saddle PE. S/p Inari procedure on 12/21. Pt seen in room and discussed during MICU rounds. Pt was awake, alert, very pleasant. Transitioned from heparin gtt to eliquis this AM. He endorsed eating well at home PTA and has been making some dietary changes recently including having more salads and cauliflower rice products instead of carbs. Mainly eats chicken and fish and tries to limit sodium intake. NKFA. Observed 100% intake of breakfast this AM. No N/V/C/D reported at this time. No difficulty chewing or swallowing. Skin noted w/R. groin surgical wound; no edema present. No pain endorsed. Afebrile this AM. Encouraged pt to continue w/current diet at home. Will continue to follow per RD protocol.

## 2020-12-22 NOTE — DISCHARGE NOTE PROVIDER - NSDCCPCAREPLAN_GEN_ALL_CORE_FT
PRINCIPAL DISCHARGE DIAGNOSIS  Diagnosis: Pulmonary embolism  Assessment and Plan of Treatment: Pulmonary embolism (or "PE") is a blockage in one or more of the blood vessels that supply blood to the lungs. Most often these blockages are caused by blood clots that form elsewhere and then travel to the lungs. In rare cases, blockages can also be caused by air bubbles, tiny globs of fat, or pieces of tumor that travel to the lungs. If a blood clot forms or gets stuck inside a blood vessel, it can clog the vessel and keep blood from getting where it needs to go. When that happens in the lungs, the lungs can get damaged. Having blocked arteries in the lung can also make it hard to breathe and can even lead to death. Common symptoms include panting, shortness of breath, or trouble breathing, sharp, knife-like chest pain when you breathe in or strain, coughing or coughing up blood or simply a rapid heartbeat. If you get any of these symptoms, especially if they happen over a short period of time (hours or days) or get worse quickly, call for an ambulance. At the hospital, doctors can run tests to find out if you do have a clot. Blood clots in the lungs can lead to death. That's why it's important to act fast and find out if there is a clot.      SECONDARY DISCHARGE DIAGNOSES  Diagnosis: Factor V Leiden  Assessment and Plan of Treatment: Factor V Leiden     PRINCIPAL DISCHARGE DIAGNOSIS  Diagnosis: Pulmonary embolism  Assessment and Plan of Treatment: Pulmonary embolism (or "PE") is a blockage in one or more of the blood vessels that supply blood to the lungs. Most often these blockages are caused by blood clots that form elsewhere and then travel to the lungs.  If a blood clot forms or gets stuck inside a blood vessel, it can clog the vessel and keep blood from getting where it needs to go. When that happens in the lungs, the lungs can get damaged. Having blocked arteries in the lung can also make it hard to breathe and can even lead to death. Common symptoms include panting, shortness of breath, or trouble breathing, sharp, knife-like chest pain when you breathe in or strain, coughing or coughing up blood or simply a rapid heartbeat. If you get any of these symptoms, especially if they happen over a short period of time (hours or days) or get worse quickly, call for an ambulance. Blood clots in the lungs can lead to death. That's why it's important to act fast and find out if there is a clot. You had a procecdure called a thrombectomy which means removal of the clot. You were placed on IV blood thinners and later switched to an oral blood thinner in pill form called Eliquis. Please continue to take your Eliquis as directed and follow up with your primary care      SECONDARY DISCHARGE DIAGNOSES  Diagnosis: Factor V Leiden  Assessment and Plan of Treatment: Factor V Leiden     PRINCIPAL DISCHARGE DIAGNOSIS  Diagnosis: Pulmonary embolism  Assessment and Plan of Treatment: Pulmonary embolism (or "PE") is a blockage in one or more of the blood vessels that supply blood to the lungs. Most often these blockages are caused by blood clots that form elsewhere and then travel to the lungs.  If a blood clot forms or gets stuck inside a blood vessel, it can clog the vessel and keep blood from getting where it needs to go. When that happens in the lungs, the lungs can get damaged. Having blocked arteries in the lung can also make it hard to breathe and can even lead to death. Common symptoms include panting, shortness of breath, or trouble breathing, sharp, knife-like chest pain when you breathe in or strain, coughing or coughing up blood or simply a rapid heartbeat. If you get any of these symptoms, especially if they happen over a short period of time (hours or days) or get worse quickly, call for an ambulance. Blood clots in the lungs can lead to death. That's why it's important to act fast and find out if there is a clot. You had a procecdure called a thrombectomy which means removal of the clot. You were placed on IV blood thinners and later switched to an oral blood thinner in pill form called Eliquis. Please continue to take your Eliquis as directed and follow up with your primary care      SECONDARY DISCHARGE DIAGNOSES  Diagnosis: DVT of leg (deep venous thrombosis)  Assessment and Plan of Treatment: You were found to have a DVT during this hospital admission. Deep vein thrombosis (DVT) is a serious condition that occurs when a blood clot forms in a vein located deep inside your body. A blood clot is a clump of blood that's turned to a solid state. Deep vein blood clots typically form in your thigh or lower leg, but they can also develop in other areas of your body. The vascular surgery team evaluated you and deemed . You were prescribed Eliquis which is a blood thinner. Please continue to take your blood thinner and follow up with your primary care doctor to further manage this condition.    Diagnosis: Factor V Leiden  Assessment and Plan of Treatment: Factor V Leiden is a mutation of one of the clotting factors in the blood. This mutation can increase your chance of developing abnormal blood clots, most commonly in your legs or lungs. Please follow up with your hematologist for continued management.     PRINCIPAL DISCHARGE DIAGNOSIS  Diagnosis: Pulmonary embolism  Assessment and Plan of Treatment: Pulmonary embolism (or "PE") is a blockage in one or more of the blood vessels that supply blood to the lungs. Most often these blockages are caused by blood clots that form elsewhere and then travel to the lungs.  If a blood clot forms or gets stuck inside a blood vessel, it can clog the vessel and keep blood from getting where it needs to go. When that happens in the lungs, the lungs can get damaged. Having blocked arteries in the lung can also make it hard to breathe and can even lead to death. Common symptoms include panting, shortness of breath, or trouble breathing, sharp, knife-like chest pain when you breathe in or strain, coughing or coughing up blood or simply a rapid heartbeat. If you get any of these symptoms, especially if they happen over a short period of time (hours or days) or get worse quickly, call for an ambulance. Blood clots in the lungs can lead to death. That's why it's important to act fast and find out if there is a clot. You had a procecdure called a thrombectomy which means removal of the clot. You were placed on IV blood thinners and later switched to an oral blood thinner in pill form called Eliquis. Please continue to take your Eliquis as directed and follow up with your primary care and pulmonologist in 10-14 days.      SECONDARY DISCHARGE DIAGNOSES  Diagnosis: DVT of leg (deep venous thrombosis)  Assessment and Plan of Treatment: You were found to have a DVT during this hospital admission. Deep vein thrombosis (DVT) is a serious condition that occurs when a blood clot forms in a vein located deep inside your body. A blood clot is a clump of blood that's turned to a solid state. Deep vein blood clots typically form in your thigh or lower leg, but they can also develop in other areas of your body. The vascular surgery team evaluated you and deemed . You were prescribed Eliquis which is a blood thinner. Please continue to take your blood thinner and follow up with your primary care doctor to further manage this condition.    Diagnosis: Factor V Leiden  Assessment and Plan of Treatment: Factor V Leiden is a mutation of one of the clotting factors in the blood. This mutation can increase your chance of developing abnormal blood clots, most commonly in your legs or lungs. Please follow up with your hematologist for continued management.

## 2020-12-22 NOTE — DIETITIAN INITIAL EVALUATION ADULT. - ADD RECOMMEND
1. Encourage PO intake w/healthy wt loss of 1-2# per week 2. Monitor lytes and replete prn. 3. Pain and bowel regimens per team

## 2020-12-22 NOTE — PROGRESS NOTE ADULT - ASSESSMENT
35M PMH factor V Leiden mutation and PE, previously on Xarelto now off, presented to [place] with complaints of shortness of breath and was found to have a PE, transferred to [facility] for possible thrombectomy.      Neurologic  AAOx3, not sedated, no active issues    Cardiac  #Right heart strain  Right heart strain noted on echocardiogram and with characteristic EKG findings. proBNP elevated    Pulmonary  #Submassive PE  Patient with confirmed submassive PE on CT. PMH of PE several years ago, after which patient was found to have factor V mutation and was started on xarelto. Patient was taken off xarelto by his PCP and subsequently developed PE.  - On CT PE evidence of chronic clots c/b intermediate PE  - EKG S1, Q3, T3 synonymous with PE  - s/p thrombectomy removing large clot burden b/l  - repeat bedside echo shows: Dilated right ventricular size, Reduced right ventricular systolic function  - Pt will need life long AC given recurrent PE and factor V leiden def - plan for sc on Eliquis   - transitioned to PO eliquis from Hep gtt   Renal  No active issues    GI  No active issues      No active issues    Heme/Onc  #Factor V mutation  Patient with factor V mutation, family history of such mutation in multiple relatives. Hx of PE previously on Xarelto now off Xarelto and with recurrent PE  - Mgmt as per PE section    ID  No active issues    Endocrine   No active issues    F: None  E: Replete PRN  N: NPO for possible thrombectomy  A: MICU    FULL CODE
35M PMH factor V Leiden mutation and PE, previously on Xarelto now off, presented to [place] with complaints of shortness of breath and was found to have a PE, transferred to [facility] for possible thrombectomy.      Neurologic  AAOx3, not sedated, no active issues    Cardiac  #Right heart strain  Right heart strain noted on echocardiogram and with characteristic EKG findings. proBNP elevated    Pulmonary  #Submassive PE  Patient with confirmed submassive PE on CT. PMH of PE several years ago, after which patient was found to have factor V mutation and was started on xarelto. Patient was taken off xarelto by his PCP and subsequently developed PE.  - On CT PE evidence of chronic clots c/b intermediate PE  - Continue with heparin gtts for now, ptt target for full AC  - NPO for thrombectomy today   - EKG S1, Q3, T3 synonymous with PE  - bedside echo Dilated right ventricular size, Reduced right ventricular systolic function  - Pt will need life long AC given recurrent PE and factor V leiden def    Renal  No active issues    GI  No active issues      No active issues    Heme/Onc  #Factor V mutation  Patient with factor V mutation, family history of such mutation in multiple relatives. Hx of PE previously on Xarelto now off Xarelto and with recurrent PE  - Mgmt as per PE section    ID  No active issues    Endocrine   No active issues    F: None  E: Replete PRN  N: NPO for possible thrombectomy  A: MICU    FULL CODE
35 yr old male with 2nd episode of unprovoked submassive PE with hx of Factor V leiden mutation (?).

## 2020-12-23 LAB — SURGICAL PATHOLOGY STUDY: SIGNIFICANT CHANGE UP

## 2020-12-24 DIAGNOSIS — D68.51 ACTIVATED PROTEIN C RESISTANCE: ICD-10-CM

## 2020-12-24 DIAGNOSIS — I26.99 OTHER PULMONARY EMBOLISM WITHOUT ACUTE COR PULMONALE: ICD-10-CM

## 2020-12-24 DIAGNOSIS — I51.89 OTHER ILL-DEFINED HEART DISEASES: ICD-10-CM

## 2020-12-24 DIAGNOSIS — E66.01 MORBID (SEVERE) OBESITY DUE TO EXCESS CALORIES: ICD-10-CM

## 2020-12-24 DIAGNOSIS — D68.2 HEREDITARY DEFICIENCY OF OTHER CLOTTING FACTORS: ICD-10-CM

## 2020-12-24 DIAGNOSIS — I27.20 PULMONARY HYPERTENSION, UNSPECIFIED: ICD-10-CM

## 2020-12-28 PROBLEM — Z00.00 ENCOUNTER FOR PREVENTIVE HEALTH EXAMINATION: Status: ACTIVE | Noted: 2020-12-28

## 2020-12-29 ENCOUNTER — APPOINTMENT (OUTPATIENT)
Dept: PULMONOLOGY | Facility: CLINIC | Age: 35
End: 2020-12-29
Payer: COMMERCIAL

## 2020-12-29 DIAGNOSIS — Z82.49 FAMILY HISTORY OF ISCHEMIC HEART DISEASE AND OTHER DISEASES OF THE CIRCULATORY SYSTEM: ICD-10-CM

## 2020-12-29 DIAGNOSIS — Z83.2 FAMILY HISTORY OF DISEASES OF THE BLOOD AND BLOOD-FORMING ORGANS AND CERTAIN DISORDERS INVOLVING THE IMMUNE MECHANISM: ICD-10-CM

## 2020-12-29 PROCEDURE — 99214 OFFICE O/P EST MOD 30 MIN: CPT | Mod: 95

## 2020-12-29 NOTE — REASON FOR VISIT
[Home] : at home, [unfilled] , at the time of the visit. [Medical Office: ([facility])___] : at the medical office located in  [Follow-Up] : a follow-up visit [Pulmonary Embolism] : pulmonary embolism [Pulmonary Hypertension] : pulmonary hypertension

## 2020-12-29 NOTE — HISTORY OF PRESENT ILLNESS
[Never] : never [TextBox_4] : The patient is doing very well.  He is walking going shopping.  He has no problem swelling of the leg.  He has no cough.  He has no bleeding.  No chest pain no palpitation.

## 2020-12-29 NOTE — ASSESSMENT
[FreeTextEntry1] : The patient was readmitted with recurrent saddle embolus with acute right heart failure and pulmonary hypertension.  The patient had a previous episodes years ago but he was noncompliant with the anticoagulation.  Patient underwent catheter directed thrombectomy.  Thrombus pathology was discussed with the patient.  Patient clinically improved with decrease in the heart rate.\par \par The patient is clinically stable on apixaban 1 tablet the day at this point.  Is compliant with the anticoagulation.  There is no evidence neither of bleeding or recurrent episode.  The patient exercise capacity improved.  There is no clinical clinical evidence of post thrombotic syndrome.\par \par I informed the patient that he needs to rest for another week or 2 until his condition is much more stable.\par \par The patient was seen by hematologist and the hematological work-up at this point is still negative.\par \par The patient has a strong family history of thromboembolic disease and he had a recurrent thromboembolic disease.  His anticoagulation is indefinite.  I will schedule 6-minute walk test next visit.

## 2021-01-21 ENCOUNTER — APPOINTMENT (OUTPATIENT)
Dept: PULMONOLOGY | Facility: CLINIC | Age: 36
End: 2021-01-21
Payer: COMMERCIAL

## 2021-01-21 VITALS
BODY MASS INDEX: 41.31 KG/M2 | WEIGHT: 305 LBS | TEMPERATURE: 97.6 F | HEIGHT: 72 IN | SYSTOLIC BLOOD PRESSURE: 119 MMHG | OXYGEN SATURATION: 97 % | DIASTOLIC BLOOD PRESSURE: 82 MMHG | HEART RATE: 77 BPM

## 2021-01-21 PROCEDURE — 99072 ADDL SUPL MATRL&STAF TM PHE: CPT

## 2021-01-21 PROCEDURE — 99215 OFFICE O/P EST HI 40 MIN: CPT | Mod: 25

## 2021-01-21 PROCEDURE — 94618 PULMONARY STRESS TESTING: CPT

## 2021-01-21 NOTE — HISTORY OF PRESENT ILLNESS
[Never] : never [TextBox_4] : Is doing very well.  He is walking the dog for about 10 blocks and does not have any shortness of breath.  There is no limitation of his daily activity.  He is not bleeding from the nose the gums.  The legs are not swollen nor painful

## 2021-01-21 NOTE — END OF VISIT
[FreeTextEntry2] : I performed the 6-minute walk test myself [Time Spent: ___ minutes] : I have spent [unfilled] minutes of time on the encounter. [>50% of the face to face encounter time was spent on counseling and/or coordination of care for ___] : Greater than 50% of the face to face encounter time was spent on counseling and/or coordination of care for [unfilled]

## 2021-01-21 NOTE — PROCEDURE
[FreeTextEntry1] : 99869 - 6 min walk\par Pre 6 min walk\par Jerman scale 0\par O2 saturation 98%\par HR 75\par \par Post 6 min walk\par Jerman scale 0\par O2 saturation 97%\par HR 97\par \par Pt finished a total of X6min walk and walked a total of 366 meters.  \par Predicted distance in meters: 400\par Percent of distance compared to predicted: 91%\par Comments: \par  Interpretation:    Negative test.\par \par

## 2021-01-21 NOTE — ASSESSMENT
[FreeTextEntry1] : The patient is clinically stable.  His exercise capacity improved.  The patient has normal baseline oxygen saturation.  The 6-minute walk test was normal.  I discussed his condition in details.  The patient is on anticoagulation and indefinitely.  I will follow-up with the stress echocardiogram to evaluate for evidence of exercise-induced pulmonary hypertension.  Patient will require either VQ scan or CT scan of the chest at the end of 6 months.  Patient has no clinical evidence neither of failure of therapy nor bleeding.\par \par Patient will require thrombophilia work-up with hematology.

## 2021-01-25 PROCEDURE — 84100 ASSAY OF PHOSPHORUS: CPT

## 2021-01-25 PROCEDURE — 88304 TISSUE EXAM BY PATHOLOGIST: CPT

## 2021-01-25 PROCEDURE — 83036 HEMOGLOBIN GLYCOSYLATED A1C: CPT

## 2021-01-25 PROCEDURE — 83735 ASSAY OF MAGNESIUM: CPT

## 2021-01-25 PROCEDURE — 85027 COMPLETE CBC AUTOMATED: CPT

## 2021-01-25 PROCEDURE — U0003: CPT

## 2021-01-25 PROCEDURE — 93306 TTE W/DOPPLER COMPLETE: CPT

## 2021-01-25 PROCEDURE — C1887: CPT

## 2021-01-25 PROCEDURE — 85730 THROMBOPLASTIN TIME PARTIAL: CPT

## 2021-01-25 PROCEDURE — 80053 COMPREHEN METABOLIC PANEL: CPT

## 2021-01-25 PROCEDURE — C1894: CPT

## 2021-01-25 PROCEDURE — 93321 DOPPLER ECHO F-UP/LMTD STD: CPT

## 2021-01-25 PROCEDURE — 85610 PROTHROMBIN TIME: CPT

## 2021-01-25 PROCEDURE — 80048 BASIC METABOLIC PNL TOTAL CA: CPT

## 2021-01-25 PROCEDURE — C1769: CPT

## 2021-01-25 PROCEDURE — C1757: CPT

## 2021-01-25 PROCEDURE — 83880 ASSAY OF NATRIURETIC PEPTIDE: CPT

## 2021-01-25 PROCEDURE — 84484 ASSAY OF TROPONIN QUANT: CPT

## 2021-01-25 PROCEDURE — 93970 EXTREMITY STUDY: CPT

## 2021-01-25 PROCEDURE — C1889: CPT

## 2021-01-25 PROCEDURE — 36415 COLL VENOUS BLD VENIPUNCTURE: CPT

## 2021-04-13 ENCOUNTER — FORM ENCOUNTER (OUTPATIENT)
Age: 36
End: 2021-04-13

## 2021-04-14 ENCOUNTER — OUTPATIENT (OUTPATIENT)
Dept: OUTPATIENT SERVICES | Facility: HOSPITAL | Age: 36
LOS: 1 days | End: 2021-04-14
Payer: COMMERCIAL

## 2021-04-14 DIAGNOSIS — I50.811 ACUTE RIGHT HEART FAILURE: ICD-10-CM

## 2021-04-14 DIAGNOSIS — I26.02 SADDLE EMBOLUS OF PULMONARY ARTERY WITH ACUTE COR PULMONALE: ICD-10-CM

## 2021-04-14 DIAGNOSIS — I27.20 PULMONARY HYPERTENSION, UNSPECIFIED: ICD-10-CM

## 2021-04-14 PROCEDURE — 93351 STRESS TTE COMPLETE: CPT

## 2021-04-14 PROCEDURE — 93351 STRESS TTE COMPLETE: CPT | Mod: 26,52

## 2021-04-22 ENCOUNTER — APPOINTMENT (OUTPATIENT)
Dept: PULMONOLOGY | Facility: CLINIC | Age: 36
End: 2021-04-22
Payer: COMMERCIAL

## 2021-04-22 VITALS
SYSTOLIC BLOOD PRESSURE: 118 MMHG | WEIGHT: 304 LBS | TEMPERATURE: 97.7 F | DIASTOLIC BLOOD PRESSURE: 76 MMHG | BODY MASS INDEX: 41.17 KG/M2 | HEART RATE: 77 BPM | OXYGEN SATURATION: 97 % | HEIGHT: 72 IN

## 2021-04-22 PROCEDURE — 99072 ADDL SUPL MATRL&STAF TM PHE: CPT

## 2021-04-22 PROCEDURE — 99214 OFFICE O/P EST MOD 30 MIN: CPT

## 2021-04-22 NOTE — HISTORY OF PRESENT ILLNESS
[TextBox_4] : He is doing very well. Exercising with a , does 500kcal a day aerobic workouts usually on elliptical machine. He does not feel respiratory limitation to his exercise and his exercise capacity is increasing. Denies any bleeding episodes on the Eliquis and is taking as directed.

## 2021-04-22 NOTE — ASSESSMENT
[FreeTextEntry1] : #H/o acute saddle PE\par #H/o DVT\par #RV failure\par #Pulmonary HTN\par \par He is clinically stable and improving. Continues to have improvement in exercise capacity. Stress testing showed his baseline pulmonary pressure has returned to normal, though still elevates when exerting himself. There are no signs of treatment failure nor bleeding episodes. \par \par Will need V/Q scan in 2 months ordered for June and patient will come to office after that to discuss results\par Continue on indefinite anticoagulation\par

## 2021-08-10 ENCOUNTER — APPOINTMENT (OUTPATIENT)
Dept: PULMONOLOGY | Facility: CLINIC | Age: 36
End: 2021-08-10

## 2021-10-18 ENCOUNTER — RX RENEWAL (OUTPATIENT)
Age: 36
End: 2021-10-18

## 2021-10-19 ENCOUNTER — APPOINTMENT (OUTPATIENT)
Dept: PULMONOLOGY | Facility: CLINIC | Age: 36
End: 2021-10-19

## 2021-12-15 ENCOUNTER — RESULT REVIEW (OUTPATIENT)
Age: 36
End: 2021-12-15

## 2022-01-10 ENCOUNTER — OUTPATIENT (OUTPATIENT)
Dept: OUTPATIENT SERVICES | Facility: HOSPITAL | Age: 37
LOS: 1 days | End: 2022-01-10
Payer: COMMERCIAL

## 2022-01-10 PROCEDURE — 78580 LUNG PERFUSION IMAGING: CPT

## 2022-01-10 PROCEDURE — 78580 LUNG PERFUSION IMAGING: CPT | Mod: 26

## 2022-01-10 PROCEDURE — A9540: CPT

## 2022-01-13 ENCOUNTER — FORM ENCOUNTER (OUTPATIENT)
Age: 37
End: 2022-01-13

## 2022-01-14 ENCOUNTER — OUTPATIENT (OUTPATIENT)
Dept: OUTPATIENT SERVICES | Facility: HOSPITAL | Age: 37
LOS: 1 days | End: 2022-01-14
Payer: COMMERCIAL

## 2022-01-14 DIAGNOSIS — I26.02 SADDLE EMBOLUS OF PULMONARY ARTERY WITH ACUTE COR PULMONALE: ICD-10-CM

## 2022-01-14 DIAGNOSIS — I27.20 PULMONARY HYPERTENSION, UNSPECIFIED: ICD-10-CM

## 2022-01-14 PROCEDURE — 93351 STRESS TTE COMPLETE: CPT

## 2022-01-14 PROCEDURE — 93351 STRESS TTE COMPLETE: CPT | Mod: 26,52

## 2022-02-18 ENCOUNTER — APPOINTMENT (OUTPATIENT)
Dept: PULMONOLOGY | Facility: CLINIC | Age: 37
End: 2022-02-18

## 2022-03-24 ENCOUNTER — APPOINTMENT (OUTPATIENT)
Dept: PULMONOLOGY | Facility: CLINIC | Age: 37
End: 2022-03-24
Payer: COMMERCIAL

## 2022-03-24 VITALS
BODY MASS INDEX: 42.66 KG/M2 | DIASTOLIC BLOOD PRESSURE: 75 MMHG | RESPIRATION RATE: 12 BRPM | HEIGHT: 72 IN | SYSTOLIC BLOOD PRESSURE: 112 MMHG | OXYGEN SATURATION: 97 % | WEIGHT: 315 LBS | TEMPERATURE: 98 F | HEART RATE: 74 BPM

## 2022-03-24 PROCEDURE — 99213 OFFICE O/P EST LOW 20 MIN: CPT

## 2022-03-24 NOTE — ASSESSMENT
[FreeTextEntry1] : Chronic thromboembolic disease\par - Patient with persistent perfusion defect despite anticoagulation. No evidence of pulmonary hypertension nor exercise induced pulmonary hypertension. He has tolerated his eliquis without issue. We reviewed with patient that he has a genetic predisposition to VTE and that his eliquis will likely be a lifelong therapy. Bleeding risk remains low at this time.

## 2022-03-24 NOTE — HISTORY OF PRESENT ILLNESS
[TextBox_4] : 35 y/o male presenting after intermediate risk saddle PE. Has been treated with eliquis for > 1 year w/o bleeding issues. His PE is likely due to genetic predisposition to clotting as noted on his inpatient testing. He has no symptoms of cardiomyopathy or exercise intolerance. He has had two stress echos which were both normal. He had a V/Q scan which showed residual left sided perfusion defect likely due to CTED.

## 2022-04-01 NOTE — DIETITIAN INITIAL EVALUATION ADULT. - LAB (SPECIFY)
Sorry for the miscommunication. This was addressed during an encounter on 3/8/22. No extra labs are needed right now. We'll talk about it during our visit before determining the next steps. Not urgent, so you can keep the appt as is.  Thanks!   BMP, BG Q6hrs, CBC per MD discretion

## 2022-08-19 ENCOUNTER — APPOINTMENT (OUTPATIENT)
Dept: NUCLEAR MEDICINE | Facility: HOSPITAL | Age: 37
End: 2022-08-19

## 2022-10-26 ENCOUNTER — RX RENEWAL (OUTPATIENT)
Age: 37
End: 2022-10-26

## 2023-02-13 ENCOUNTER — RX RENEWAL (OUTPATIENT)
Age: 38
End: 2023-02-13

## 2023-04-10 ENCOUNTER — APPOINTMENT (OUTPATIENT)
Dept: PULMONOLOGY | Facility: CLINIC | Age: 38
End: 2023-04-10
Payer: COMMERCIAL

## 2023-04-10 VITALS
DIASTOLIC BLOOD PRESSURE: 81 MMHG | OXYGEN SATURATION: 97 % | WEIGHT: 310 LBS | BODY MASS INDEX: 41.99 KG/M2 | HEART RATE: 87 BPM | HEIGHT: 72 IN | TEMPERATURE: 98.2 F | SYSTOLIC BLOOD PRESSURE: 117 MMHG

## 2023-04-10 DIAGNOSIS — I27.20 PULMONARY HYPERTENSION, UNSPECIFIED: ICD-10-CM

## 2023-04-10 DIAGNOSIS — I82.413 ACUTE EMBOLISM AND THROMBOSIS OF FEMORAL VEIN, BILATERAL: ICD-10-CM

## 2023-04-10 DIAGNOSIS — I26.02 SADDLE EMBOLUS OF PULMONARY ARTERY WITH ACUTE COR PULMONALE: ICD-10-CM

## 2023-04-10 DIAGNOSIS — I50.811 ACUTE RIGHT HEART FAILURE: ICD-10-CM

## 2023-04-10 PROCEDURE — 99213 OFFICE O/P EST LOW 20 MIN: CPT

## 2023-04-10 NOTE — HISTORY OF PRESENT ILLNESS
[Never] : never [TextBox_4] : Very well.  He is walking around no shortness of breath no bleeding.  His mother and his uncle came up with clots and have also genetic tendency same like his. [ESS] : 0

## 2023-04-10 NOTE — ASSESSMENT
[FreeTextEntry1] : I reviewed the previous reports on the CT scan VQ scan echocardiogram.  The patient had recurrent massive pulmonary emboli.  The patient had mechanical thrombectomy.  The patient is doing well and is compliant with the anticoagulation.  There is no clinical evidence neither failure of therapy no bleeding complication.  His exercise capacity improved.  There is no evidence of prothrombotic syndrome.  The last VQ scan revealed residual thromboembolic disease.  The echocardiogram revealed normal pulmonary pressures but increased with exertion.  I will follow-up on the repeat VQ scan to rule out underlying chronic thromboembolic disease versus hypertension and also on the echocardiogram.  The patient is to be on anticoagulation permanently.  The CT scan did not reveal any underlying parenchymal abnormalities.

## 2023-04-21 ENCOUNTER — APPOINTMENT (OUTPATIENT)
Dept: NUCLEAR MEDICINE | Facility: HOSPITAL | Age: 38
End: 2023-04-21

## 2023-04-21 ENCOUNTER — OUTPATIENT (OUTPATIENT)
Dept: OUTPATIENT SERVICES | Facility: HOSPITAL | Age: 38
LOS: 1 days | End: 2023-04-21
Payer: COMMERCIAL

## 2023-04-21 PROCEDURE — A9540: CPT

## 2023-04-21 PROCEDURE — 78582 LUNG VENTILAT&PERFUS IMAGING: CPT

## 2023-04-21 PROCEDURE — 78582 LUNG VENTILAT&PERFUS IMAGING: CPT | Mod: 26

## 2023-04-21 PROCEDURE — A9567: CPT

## 2023-06-19 ENCOUNTER — OUTPATIENT (OUTPATIENT)
Dept: OUTPATIENT SERVICES | Facility: HOSPITAL | Age: 38
LOS: 1 days | End: 2023-06-19

## 2023-06-19 DIAGNOSIS — I26.02 SADDLE EMBOLUS OF PULMONARY ARTERY WITH ACUTE COR PULMONALE: ICD-10-CM

## 2023-06-19 DIAGNOSIS — I50.811 ACUTE RIGHT HEART FAILURE: ICD-10-CM

## 2024-01-21 ENCOUNTER — TRANSCRIPTION ENCOUNTER (OUTPATIENT)
Age: 39
End: 2024-01-21

## 2024-01-22 ENCOUNTER — TRANSCRIPTION ENCOUNTER (OUTPATIENT)
Age: 39
End: 2024-01-22

## 2024-01-22 ENCOUNTER — INPATIENT (INPATIENT)
Facility: HOSPITAL | Age: 39
LOS: 4 days | Discharge: HOME | DRG: 853 | End: 2024-01-27
Attending: SURGERY | Admitting: SURGERY
Payer: COMMERCIAL

## 2024-01-22 VITALS
RESPIRATION RATE: 20 BRPM | TEMPERATURE: 99 F | WEIGHT: 300.05 LBS | OXYGEN SATURATION: 99 % | DIASTOLIC BLOOD PRESSURE: 72 MMHG | HEART RATE: 110 BPM | SYSTOLIC BLOOD PRESSURE: 116 MMHG

## 2024-01-22 LAB
A1C WITH ESTIMATED AVERAGE GLUCOSE RESULT: 9.5 % — HIGH (ref 4–5.6)
ALBUMIN SERPL ELPH-MCNC: 3.3 G/DL — SIGNIFICANT CHANGE UP (ref 3.3–5)
ALBUMIN SERPL ELPH-MCNC: 4 G/DL — SIGNIFICANT CHANGE UP (ref 3.3–5)
ALP SERPL-CCNC: 89 U/L — SIGNIFICANT CHANGE UP (ref 40–120)
ALP SERPL-CCNC: 98 U/L — SIGNIFICANT CHANGE UP (ref 40–120)
ALT FLD-CCNC: 58 U/L — HIGH (ref 10–45)
ALT FLD-CCNC: 69 U/L — HIGH (ref 10–45)
ANION GAP SERPL CALC-SCNC: 11 MMOL/L — SIGNIFICANT CHANGE UP (ref 5–17)
ANION GAP SERPL CALC-SCNC: 12 MMOL/L — SIGNIFICANT CHANGE UP (ref 5–17)
ANISOCYTOSIS BLD QL: SLIGHT — SIGNIFICANT CHANGE UP
APPEARANCE UR: ABNORMAL
APTT BLD: 35.9 SEC — HIGH (ref 24.5–35.6)
APTT BLD: 48.8 SEC — HIGH (ref 24.5–35.6)
APTT BLD: 56.8 SEC — HIGH (ref 24.5–35.6)
AST SERPL-CCNC: 24 U/L — SIGNIFICANT CHANGE UP (ref 10–40)
AST SERPL-CCNC: 29 U/L — SIGNIFICANT CHANGE UP (ref 10–40)
BACTERIA # UR AUTO: ABNORMAL /HPF
BASOPHILS # BLD AUTO: 0 K/UL — SIGNIFICANT CHANGE UP (ref 0–0.2)
BASOPHILS # BLD AUTO: 0.05 K/UL — SIGNIFICANT CHANGE UP (ref 0–0.2)
BASOPHILS NFR BLD AUTO: 0 % — SIGNIFICANT CHANGE UP (ref 0–2)
BASOPHILS NFR BLD AUTO: 0.2 % — SIGNIFICANT CHANGE UP (ref 0–2)
BILIRUB DIRECT SERPL-MCNC: 0.4 MG/DL — HIGH (ref 0–0.3)
BILIRUB INDIRECT FLD-MCNC: 1 MG/DL — SIGNIFICANT CHANGE UP (ref 0.2–1)
BILIRUB SERPL-MCNC: 1.4 MG/DL — HIGH (ref 0.2–1.2)
BILIRUB SERPL-MCNC: 1.4 MG/DL — HIGH (ref 0.2–1.2)
BILIRUB UR-MCNC: ABNORMAL
BLD GP AB SCN SERPL QL: NEGATIVE — SIGNIFICANT CHANGE UP
BUN SERPL-MCNC: 5 MG/DL — LOW (ref 7–23)
BUN SERPL-MCNC: 5 MG/DL — LOW (ref 7–23)
CALCIUM SERPL-MCNC: 8.6 MG/DL — SIGNIFICANT CHANGE UP (ref 8.4–10.5)
CALCIUM SERPL-MCNC: 9.5 MG/DL — SIGNIFICANT CHANGE UP (ref 8.4–10.5)
CAST: 2 /LPF — SIGNIFICANT CHANGE UP (ref 0–4)
CHLORIDE SERPL-SCNC: 101 MMOL/L — SIGNIFICANT CHANGE UP (ref 96–108)
CHLORIDE SERPL-SCNC: 98 MMOL/L — SIGNIFICANT CHANGE UP (ref 96–108)
CO2 SERPL-SCNC: 22 MMOL/L — SIGNIFICANT CHANGE UP (ref 22–31)
CO2 SERPL-SCNC: 23 MMOL/L — SIGNIFICANT CHANGE UP (ref 22–31)
COLOR SPEC: ABNORMAL
CREAT SERPL-MCNC: 0.64 MG/DL — SIGNIFICANT CHANGE UP (ref 0.5–1.3)
CREAT SERPL-MCNC: 0.74 MG/DL — SIGNIFICANT CHANGE UP (ref 0.5–1.3)
DIFF PNL FLD: NEGATIVE — SIGNIFICANT CHANGE UP
EGFR: 118 ML/MIN/1.73M2 — SIGNIFICANT CHANGE UP
EGFR: 124 ML/MIN/1.73M2 — SIGNIFICANT CHANGE UP
EOSINOPHIL # BLD AUTO: 0 K/UL — SIGNIFICANT CHANGE UP (ref 0–0.5)
EOSINOPHIL # BLD AUTO: 0.02 K/UL — SIGNIFICANT CHANGE UP (ref 0–0.5)
EOSINOPHIL NFR BLD AUTO: 0 % — SIGNIFICANT CHANGE UP (ref 0–6)
EOSINOPHIL NFR BLD AUTO: 0.1 % — SIGNIFICANT CHANGE UP (ref 0–6)
ESTIMATED AVERAGE GLUCOSE: 226 MG/DL — HIGH (ref 68–114)
FLUAV AG NPH QL: SIGNIFICANT CHANGE UP
FLUBV AG NPH QL: SIGNIFICANT CHANGE UP
GLUCOSE BLDC GLUCOMTR-MCNC: 189 MG/DL — HIGH (ref 70–99)
GLUCOSE SERPL-MCNC: 185 MG/DL — HIGH (ref 70–99)
GLUCOSE SERPL-MCNC: 227 MG/DL — HIGH (ref 70–99)
GLUCOSE UR QL: 500 MG/DL
HAV IGM SER-ACNC: SIGNIFICANT CHANGE UP
HBV CORE AB SER-ACNC: SIGNIFICANT CHANGE UP
HBV CORE IGM SER-ACNC: SIGNIFICANT CHANGE UP
HBV SURFACE AB SER-ACNC: REACTIVE
HBV SURFACE AG SER-ACNC: SIGNIFICANT CHANGE UP
HCT VFR BLD CALC: 37.7 % — LOW (ref 39–50)
HCT VFR BLD CALC: 42.2 % — SIGNIFICANT CHANGE UP (ref 39–50)
HCT VFR BLD CALC: 44.5 % — SIGNIFICANT CHANGE UP (ref 39–50)
HCV AB S/CO SERPL IA: 0.03 S/CO — SIGNIFICANT CHANGE UP
HCV AB SERPL-IMP: SIGNIFICANT CHANGE UP
HGB BLD-MCNC: 12.3 G/DL — LOW (ref 13–17)
HGB BLD-MCNC: 13.5 G/DL — SIGNIFICANT CHANGE UP (ref 13–17)
HGB BLD-MCNC: 14.5 G/DL — SIGNIFICANT CHANGE UP (ref 13–17)
HYPOCHROMIA BLD QL: SLIGHT — SIGNIFICANT CHANGE UP
IMM GRANULOCYTES NFR BLD AUTO: 0.8 % — SIGNIFICANT CHANGE UP (ref 0–0.9)
INR BLD: 1.45 — HIGH (ref 0.85–1.18)
KETONES UR-MCNC: 80 MG/DL
LACTATE SERPL-SCNC: 1.6 MMOL/L — SIGNIFICANT CHANGE UP (ref 0.5–2)
LACTATE SERPL-SCNC: 1.9 MMOL/L — SIGNIFICANT CHANGE UP (ref 0.5–2)
LEUKOCYTE ESTERASE UR-ACNC: ABNORMAL
LIDOCAIN IGE QN: 13 U/L — SIGNIFICANT CHANGE UP (ref 7–60)
LYMPHOCYTES # BLD AUTO: 14.9 % — SIGNIFICANT CHANGE UP (ref 13–44)
LYMPHOCYTES # BLD AUTO: 18.3 % — SIGNIFICANT CHANGE UP (ref 13–44)
LYMPHOCYTES # BLD AUTO: 3.86 K/UL — HIGH (ref 1–3.3)
LYMPHOCYTES # BLD AUTO: 4.84 K/UL — HIGH (ref 1–3.3)
MACROCYTES BLD QL: SLIGHT — SIGNIFICANT CHANGE UP
MAGNESIUM SERPL-MCNC: 1.8 MG/DL — SIGNIFICANT CHANGE UP (ref 1.6–2.6)
MANUAL SMEAR VERIFICATION: SIGNIFICANT CHANGE UP
MCHC RBC-ENTMCNC: 26.4 PG — LOW (ref 27–34)
MCHC RBC-ENTMCNC: 26.6 PG — LOW (ref 27–34)
MCHC RBC-ENTMCNC: 26.7 PG — LOW (ref 27–34)
MCHC RBC-ENTMCNC: 32 GM/DL — SIGNIFICANT CHANGE UP (ref 32–36)
MCHC RBC-ENTMCNC: 32.6 GM/DL — SIGNIFICANT CHANGE UP (ref 32–36)
MCHC RBC-ENTMCNC: 32.6 GM/DL — SIGNIFICANT CHANGE UP (ref 32–36)
MCV RBC AUTO: 81.6 FL — SIGNIFICANT CHANGE UP (ref 80–100)
MCV RBC AUTO: 82 FL — SIGNIFICANT CHANGE UP (ref 80–100)
MCV RBC AUTO: 82.6 FL — SIGNIFICANT CHANGE UP (ref 80–100)
MICROCYTES BLD QL: SLIGHT — SIGNIFICANT CHANGE UP
MONOCYTES # BLD AUTO: 1.38 K/UL — HIGH (ref 0–0.9)
MONOCYTES # BLD AUTO: 2.45 K/UL — HIGH (ref 0–0.9)
MONOCYTES NFR BLD AUTO: 5.2 % — SIGNIFICANT CHANGE UP (ref 2–14)
MONOCYTES NFR BLD AUTO: 9.5 % — SIGNIFICANT CHANGE UP (ref 2–14)
MUCOUS THREADS # UR AUTO: PRESENT
NEUTROPHILS # BLD AUTO: 19.25 K/UL — HIGH (ref 1.8–7.4)
NEUTROPHILS # BLD AUTO: 20.23 K/UL — HIGH (ref 1.8–7.4)
NEUTROPHILS NFR BLD AUTO: 74.5 % — SIGNIFICANT CHANGE UP (ref 43–77)
NEUTROPHILS NFR BLD AUTO: 76.5 % — SIGNIFICANT CHANGE UP (ref 43–77)
NITRITE UR-MCNC: NEGATIVE — SIGNIFICANT CHANGE UP
NRBC # BLD: 0 /100 WBCS — SIGNIFICANT CHANGE UP (ref 0–0)
NRBC # BLD: 0 /100 WBCS — SIGNIFICANT CHANGE UP (ref 0–0)
NT-PROBNP SERPL-SCNC: 67 PG/ML — SIGNIFICANT CHANGE UP (ref 0–300)
OVALOCYTES BLD QL SMEAR: SLIGHT — SIGNIFICANT CHANGE UP
PH UR: 6 — SIGNIFICANT CHANGE UP (ref 5–8)
PHOSPHATE SERPL-MCNC: 2.6 MG/DL — SIGNIFICANT CHANGE UP (ref 2.5–4.5)
PLAT MORPH BLD: ABNORMAL
PLATELET # BLD AUTO: 273 K/UL — SIGNIFICANT CHANGE UP (ref 150–400)
PLATELET # BLD AUTO: 293 K/UL — SIGNIFICANT CHANGE UP (ref 150–400)
PLATELET # BLD AUTO: 342 K/UL — SIGNIFICANT CHANGE UP (ref 150–400)
POIKILOCYTOSIS BLD QL AUTO: SLIGHT — SIGNIFICANT CHANGE UP
POLYCHROMASIA BLD QL SMEAR: SIGNIFICANT CHANGE UP
POTASSIUM SERPL-MCNC: 3.7 MMOL/L — SIGNIFICANT CHANGE UP (ref 3.5–5.3)
POTASSIUM SERPL-MCNC: 4.2 MMOL/L — SIGNIFICANT CHANGE UP (ref 3.5–5.3)
POTASSIUM SERPL-SCNC: 3.7 MMOL/L — SIGNIFICANT CHANGE UP (ref 3.5–5.3)
POTASSIUM SERPL-SCNC: 4.2 MMOL/L — SIGNIFICANT CHANGE UP (ref 3.5–5.3)
PROT SERPL-MCNC: 7.1 G/DL — SIGNIFICANT CHANGE UP (ref 6–8.3)
PROT SERPL-MCNC: 7.9 G/DL — SIGNIFICANT CHANGE UP (ref 6–8.3)
PROT UR-MCNC: 100 MG/DL
PROTHROM AB SERPL-ACNC: 16.3 SEC — HIGH (ref 9.5–13)
RBC # BLD: 4.62 M/UL — SIGNIFICANT CHANGE UP (ref 4.2–5.8)
RBC # BLD: 5.11 M/UL — SIGNIFICANT CHANGE UP (ref 4.2–5.8)
RBC # BLD: 5.43 M/UL — SIGNIFICANT CHANGE UP (ref 4.2–5.8)
RBC # FLD: 12.9 % — SIGNIFICANT CHANGE UP (ref 10.3–14.5)
RBC BLD AUTO: ABNORMAL
RBC CASTS # UR COMP ASSIST: 4 /HPF — SIGNIFICANT CHANGE UP (ref 0–4)
RH IG SCN BLD-IMP: POSITIVE — SIGNIFICANT CHANGE UP
RSV RNA NPH QL NAA+NON-PROBE: SIGNIFICANT CHANGE UP
SARS-COV-2 RNA SPEC QL NAA+PROBE: SIGNIFICANT CHANGE UP
SODIUM SERPL-SCNC: 133 MMOL/L — LOW (ref 135–145)
SODIUM SERPL-SCNC: 134 MMOL/L — LOW (ref 135–145)
SP GR SPEC: 1.02 — SIGNIFICANT CHANGE UP (ref 1–1.03)
SPHEROCYTES BLD QL SMEAR: SLIGHT — SIGNIFICANT CHANGE UP
SQUAMOUS # UR AUTO: 6 /HPF — HIGH (ref 0–5)
TROPONIN T, HIGH SENSITIVITY RESULT: <6 NG/L — SIGNIFICANT CHANGE UP (ref 0–51)
UROBILINOGEN FLD QL: 1 MG/DL — SIGNIFICANT CHANGE UP (ref 0.2–1)
WBC # BLD: 23.32 K/UL — HIGH (ref 3.8–10.5)
WBC # BLD: 25.83 K/UL — HIGH (ref 3.8–10.5)
WBC # BLD: 26.45 K/UL — HIGH (ref 3.8–10.5)
WBC # FLD AUTO: 23.32 K/UL — HIGH (ref 3.8–10.5)
WBC # FLD AUTO: 25.83 K/UL — HIGH (ref 3.8–10.5)
WBC # FLD AUTO: 26.45 K/UL — HIGH (ref 3.8–10.5)
WBC UR QL: 8 /HPF — HIGH (ref 0–5)

## 2024-01-22 PROCEDURE — 99285 EMERGENCY DEPT VISIT HI MDM: CPT

## 2024-01-22 PROCEDURE — 99222 1ST HOSP IP/OBS MODERATE 55: CPT

## 2024-01-22 PROCEDURE — 74177 CT ABD & PELVIS W/CONTRAST: CPT | Mod: 26,MA

## 2024-01-22 PROCEDURE — 99223 1ST HOSP IP/OBS HIGH 75: CPT | Mod: GC

## 2024-01-22 PROCEDURE — 44970 LAPAROSCOPY APPENDECTOMY: CPT | Mod: 80,GC

## 2024-01-22 PROCEDURE — 36000 PLACE NEEDLE IN VEIN: CPT

## 2024-01-22 PROCEDURE — 36010 PLACE CATHETER IN VEIN: CPT

## 2024-01-22 PROCEDURE — 76937 US GUIDE VASCULAR ACCESS: CPT | Mod: 26

## 2024-01-22 PROCEDURE — 71045 X-RAY EXAM CHEST 1 VIEW: CPT | Mod: 26

## 2024-01-22 RX ORDER — SODIUM CHLORIDE 9 MG/ML
1000 INJECTION INTRAMUSCULAR; INTRAVENOUS; SUBCUTANEOUS
Refills: 0 | Status: DISCONTINUED | OUTPATIENT
Start: 2024-01-22 | End: 2024-01-23

## 2024-01-22 RX ORDER — ACETAMINOPHEN 500 MG
1000 TABLET ORAL ONCE
Refills: 0 | Status: COMPLETED | OUTPATIENT
Start: 2024-01-22 | End: 2024-01-22

## 2024-01-22 RX ORDER — INSULIN LISPRO 100/ML
VIAL (ML) SUBCUTANEOUS
Refills: 0 | Status: DISCONTINUED | OUTPATIENT
Start: 2024-01-22 | End: 2024-01-23

## 2024-01-22 RX ORDER — SODIUM CHLORIDE 9 MG/ML
1000 INJECTION, SOLUTION INTRAVENOUS
Refills: 0 | Status: DISCONTINUED | OUTPATIENT
Start: 2024-01-22 | End: 2024-01-22

## 2024-01-22 RX ORDER — PIPERACILLIN AND TAZOBACTAM 4; .5 G/20ML; G/20ML
3.38 INJECTION, POWDER, LYOPHILIZED, FOR SOLUTION INTRAVENOUS EVERY 8 HOURS
Refills: 0 | Status: DISCONTINUED | OUTPATIENT
Start: 2024-01-22 | End: 2024-01-23

## 2024-01-22 RX ORDER — HYDROMORPHONE HYDROCHLORIDE 2 MG/ML
1 INJECTION INTRAMUSCULAR; INTRAVENOUS; SUBCUTANEOUS EVERY 4 HOURS
Refills: 0 | Status: DISCONTINUED | OUTPATIENT
Start: 2024-01-22 | End: 2024-01-23

## 2024-01-22 RX ORDER — SODIUM CHLORIDE 9 MG/ML
1000 INJECTION INTRAMUSCULAR; INTRAVENOUS; SUBCUTANEOUS ONCE
Refills: 0 | Status: COMPLETED | OUTPATIENT
Start: 2024-01-22 | End: 2024-01-22

## 2024-01-22 RX ORDER — MAGNESIUM SULFATE 500 MG/ML
1 VIAL (ML) INJECTION ONCE
Refills: 0 | Status: COMPLETED | OUTPATIENT
Start: 2024-01-22 | End: 2024-01-22

## 2024-01-22 RX ORDER — HYDROMORPHONE HYDROCHLORIDE 2 MG/ML
0.5 INJECTION INTRAMUSCULAR; INTRAVENOUS; SUBCUTANEOUS EVERY 4 HOURS
Refills: 0 | Status: DISCONTINUED | OUTPATIENT
Start: 2024-01-22 | End: 2024-01-23

## 2024-01-22 RX ORDER — DEXTROSE 50 % IN WATER 50 %
25 SYRINGE (ML) INTRAVENOUS ONCE
Refills: 0 | Status: DISCONTINUED | OUTPATIENT
Start: 2024-01-22 | End: 2024-01-23

## 2024-01-22 RX ORDER — EMTRICITABINE AND TENOFOVIR DISOPROXIL FUMARATE 200; 300 MG/1; MG/1
1 TABLET, FILM COATED ORAL DAILY
Refills: 0 | Status: DISCONTINUED | OUTPATIENT
Start: 2024-01-23 | End: 2024-01-23

## 2024-01-22 RX ORDER — HEPARIN SODIUM 5000 [USP'U]/ML
2200 INJECTION INTRAVENOUS; SUBCUTANEOUS
Qty: 25000 | Refills: 0 | Status: DISCONTINUED | OUTPATIENT
Start: 2024-01-22 | End: 2024-01-23

## 2024-01-22 RX ORDER — PANTOPRAZOLE SODIUM 20 MG/1
40 TABLET, DELAYED RELEASE ORAL DAILY
Refills: 0 | Status: DISCONTINUED | OUTPATIENT
Start: 2024-01-22 | End: 2024-01-23

## 2024-01-22 RX ORDER — INFLUENZA VIRUS VACCINE 15; 15; 15; 15 UG/.5ML; UG/.5ML; UG/.5ML; UG/.5ML
0.5 SUSPENSION INTRAMUSCULAR ONCE
Refills: 0 | Status: DISCONTINUED | OUTPATIENT
Start: 2024-01-22 | End: 2024-01-24

## 2024-01-22 RX ORDER — ACETAMINOPHEN 500 MG
1000 TABLET ORAL EVERY 6 HOURS
Refills: 0 | Status: DISCONTINUED | OUTPATIENT
Start: 2024-01-22 | End: 2024-01-23

## 2024-01-22 RX ORDER — PIPERACILLIN AND TAZOBACTAM 4; .5 G/20ML; G/20ML
3.38 INJECTION, POWDER, LYOPHILIZED, FOR SOLUTION INTRAVENOUS ONCE
Refills: 0 | Status: COMPLETED | OUTPATIENT
Start: 2024-01-22 | End: 2024-01-22

## 2024-01-22 RX ORDER — SODIUM CHLORIDE 9 MG/ML
1000 INJECTION, SOLUTION INTRAVENOUS
Refills: 0 | Status: DISCONTINUED | OUTPATIENT
Start: 2024-01-22 | End: 2024-01-23

## 2024-01-22 RX ORDER — DEXTROSE 50 % IN WATER 50 %
15 SYRINGE (ML) INTRAVENOUS ONCE
Refills: 0 | Status: DISCONTINUED | OUTPATIENT
Start: 2024-01-22 | End: 2024-01-23

## 2024-01-22 RX ORDER — ONDANSETRON 8 MG/1
4 TABLET, FILM COATED ORAL EVERY 6 HOURS
Refills: 0 | Status: DISCONTINUED | OUTPATIENT
Start: 2024-01-22 | End: 2024-01-23

## 2024-01-22 RX ORDER — ONDANSETRON 8 MG/1
4 TABLET, FILM COATED ORAL ONCE
Refills: 0 | Status: COMPLETED | OUTPATIENT
Start: 2024-01-22 | End: 2024-01-22

## 2024-01-22 RX ORDER — MORPHINE SULFATE 50 MG/1
4 CAPSULE, EXTENDED RELEASE ORAL ONCE
Refills: 0 | Status: DISCONTINUED | OUTPATIENT
Start: 2024-01-22 | End: 2024-01-22

## 2024-01-22 RX ORDER — SODIUM CHLORIDE 9 MG/ML
1500 INJECTION INTRAMUSCULAR; INTRAVENOUS; SUBCUTANEOUS ONCE
Refills: 0 | Status: COMPLETED | OUTPATIENT
Start: 2024-01-22 | End: 2024-01-22

## 2024-01-22 RX ORDER — IOHEXOL 300 MG/ML
30 INJECTION, SOLUTION INTRAVENOUS ONCE
Refills: 0 | Status: COMPLETED | OUTPATIENT
Start: 2024-01-22 | End: 2024-01-22

## 2024-01-22 RX ORDER — GLUCAGON INJECTION, SOLUTION 0.5 MG/.1ML
1 INJECTION, SOLUTION SUBCUTANEOUS ONCE
Refills: 0 | Status: DISCONTINUED | OUTPATIENT
Start: 2024-01-22 | End: 2024-01-23

## 2024-01-22 RX ORDER — CHLORHEXIDINE GLUCONATE 213 G/1000ML
1 SOLUTION TOPICAL
Refills: 0 | Status: DISCONTINUED | OUTPATIENT
Start: 2024-01-22 | End: 2024-01-23

## 2024-01-22 RX ADMIN — PIPERACILLIN AND TAZOBACTAM 3.38 GRAM(S): 4; .5 INJECTION, POWDER, LYOPHILIZED, FOR SOLUTION INTRAVENOUS at 14:44

## 2024-01-22 RX ADMIN — HYDROMORPHONE HYDROCHLORIDE 1 MILLIGRAM(S): 2 INJECTION INTRAMUSCULAR; INTRAVENOUS; SUBCUTANEOUS at 16:01

## 2024-01-22 RX ADMIN — HYDROMORPHONE HYDROCHLORIDE 1 MILLIGRAM(S): 2 INJECTION INTRAMUSCULAR; INTRAVENOUS; SUBCUTANEOUS at 16:15

## 2024-01-22 RX ADMIN — Medication 62.5 MILLIMOLE(S): at 18:21

## 2024-01-22 RX ADMIN — Medication 2: at 21:42

## 2024-01-22 RX ADMIN — SODIUM CHLORIDE 1500 MILLILITER(S): 9 INJECTION INTRAMUSCULAR; INTRAVENOUS; SUBCUTANEOUS at 11:24

## 2024-01-22 RX ADMIN — SODIUM CHLORIDE 150 MILLILITER(S): 9 INJECTION INTRAMUSCULAR; INTRAVENOUS; SUBCUTANEOUS at 16:02

## 2024-01-22 RX ADMIN — Medication 1000 MILLIGRAM(S): at 14:45

## 2024-01-22 RX ADMIN — HEPARIN SODIUM 2200 UNIT(S)/HR: 5000 INJECTION INTRAVENOUS; SUBCUTANEOUS at 16:45

## 2024-01-22 RX ADMIN — ONDANSETRON 4 MILLIGRAM(S): 8 TABLET, FILM COATED ORAL at 20:29

## 2024-01-22 RX ADMIN — Medication 400 MILLIGRAM(S): at 18:23

## 2024-01-22 RX ADMIN — MORPHINE SULFATE 4 MILLIGRAM(S): 50 CAPSULE, EXTENDED RELEASE ORAL at 10:07

## 2024-01-22 RX ADMIN — HYDROMORPHONE HYDROCHLORIDE 1 MILLIGRAM(S): 2 INJECTION INTRAMUSCULAR; INTRAVENOUS; SUBCUTANEOUS at 20:27

## 2024-01-22 RX ADMIN — MORPHINE SULFATE 4 MILLIGRAM(S): 50 CAPSULE, EXTENDED RELEASE ORAL at 14:45

## 2024-01-22 RX ADMIN — PIPERACILLIN AND TAZOBACTAM 200 GRAM(S): 4; .5 INJECTION, POWDER, LYOPHILIZED, FOR SOLUTION INTRAVENOUS at 11:59

## 2024-01-22 RX ADMIN — CHLORHEXIDINE GLUCONATE 1 APPLICATION(S): 213 SOLUTION TOPICAL at 16:02

## 2024-01-22 RX ADMIN — SODIUM CHLORIDE 1000 MILLILITER(S): 9 INJECTION INTRAMUSCULAR; INTRAVENOUS; SUBCUTANEOUS at 14:44

## 2024-01-22 RX ADMIN — HYDROMORPHONE HYDROCHLORIDE 1 MILLIGRAM(S): 2 INJECTION INTRAMUSCULAR; INTRAVENOUS; SUBCUTANEOUS at 20:40

## 2024-01-22 RX ADMIN — HYDROMORPHONE HYDROCHLORIDE 0.5 MILLIGRAM(S): 2 INJECTION INTRAMUSCULAR; INTRAVENOUS; SUBCUTANEOUS at 19:10

## 2024-01-22 RX ADMIN — SODIUM CHLORIDE 1000 MILLILITER(S): 9 INJECTION INTRAMUSCULAR; INTRAVENOUS; SUBCUTANEOUS at 10:07

## 2024-01-22 RX ADMIN — IOHEXOL 30 MILLILITER(S): 300 INJECTION, SOLUTION INTRAVENOUS at 10:07

## 2024-01-22 RX ADMIN — Medication 400 MILLIGRAM(S): at 11:24

## 2024-01-22 RX ADMIN — SODIUM CHLORIDE 1500 MILLILITER(S): 9 INJECTION INTRAMUSCULAR; INTRAVENOUS; SUBCUTANEOUS at 14:46

## 2024-01-22 RX ADMIN — PANTOPRAZOLE SODIUM 40 MILLIGRAM(S): 20 TABLET, DELAYED RELEASE ORAL at 16:09

## 2024-01-22 RX ADMIN — PIPERACILLIN AND TAZOBACTAM 25 GRAM(S): 4; .5 INJECTION, POWDER, LYOPHILIZED, FOR SOLUTION INTRAVENOUS at 16:09

## 2024-01-22 RX ADMIN — Medication 100 GRAM(S): at 16:44

## 2024-01-22 RX ADMIN — ONDANSETRON 4 MILLIGRAM(S): 8 TABLET, FILM COATED ORAL at 10:07

## 2024-01-22 RX ADMIN — HYDROMORPHONE HYDROCHLORIDE 0.5 MILLIGRAM(S): 2 INJECTION INTRAMUSCULAR; INTRAVENOUS; SUBCUTANEOUS at 18:57

## 2024-01-22 RX ADMIN — Medication 2: at 16:31

## 2024-01-22 RX ADMIN — Medication 1000 MILLIGRAM(S): at 19:20

## 2024-01-22 NOTE — CONSULT NOTE ADULT - ATTENDING COMMENTS
Factor V Leiden with h/o PEs requiring thrombectomy admitted with sepsis from ruptured appendicitis  physical as above  continue Zosyn  apparent uncontrolled DM he is not aware of  SSI for now  likely OR tomorrow  heparin until OR  IVC filter being considered  has received fluid resuscitation with increased insensible loss

## 2024-01-22 NOTE — PATIENT PROFILE ADULT - FALL HARM RISK - HARM RISK INTERVENTIONS

## 2024-01-22 NOTE — ED PROVIDER NOTE - CLINICAL SUMMARY MEDICAL DECISION MAKING FREE TEXT BOX
38 M pmh factor V leiden, h/o PE x 2 s/p thrombectomy 12/2020 on xarelto p/w lower abd pain, NVD and sob x 5 days.  compliant w/ xarelto.  no chest pain.  on exam pt tachy, afebrile, no hypoxia, appears in moderate painful distress, lungs ctab, +s1/2, obese abd w/ RUQ/RLQ/LLQ ttp, voluntary guarding, no rebound, no cvat, no LE edema or calf ttp, 2+ distal pulses.  ?appendicitis vs colitis vs acute tyler.  sob ? 2/2 pain, low c/f PE, unlikely acs, ? viral syndrome.  will obtain labs, ekg, cxr, ct a/p, give ivf/morphine/zofran and reeval

## 2024-01-22 NOTE — H&P ADULT - NSHPPHYSICALEXAM_GEN_ALL_CORE
General: NAD, resting comfortably in bed.  C/V: NSR.  Pulm: Nonlabored breathing, no respiratory distress on RA.  Abd: soft, rotund, mild to moderate RLQ ttp without rebound or guarding.  Extrem: WWP, no edema, SCDs in place.  Neuro: motor/sensory grossly intact, moves all ext . to command and spontaneously.

## 2024-01-22 NOTE — H&P ADULT - HISTORY OF PRESENT ILLNESS
37yo Male pt with PMH factor V Leiden presenting with multiple PEs (most recently 2020 s/p thrombectomy) now with 4-day history of abdominal pain. Patient states pain started in b/l LQ and now localized to the right. Had nausea and 1 episode of NBNB emesis. 2 watery bowel movements, passing flatus. Fever on presentation, chills at home. Mild SOB which he attributed to pain.    Last colonoscopy/EGD - none  Denies family hx of IBS, Crohn's, UC, or colon cancer.    In the ED, pt febrile to 39.2, tachycardic to 118, normotensive. On exam, abdomen is soft, rotund, mild to moderate ttp in RLQ. Labs significant for WBC 26.45, H/H 14.5/44.5, Na 133, BUN/Cr 5/0.74. CT AP with PO and IV contrast demonstrates appendix is dilated and contains multiple appendicoliths measuring up to 2 cm; there is appendiceal wall thickening and extensive infiltration of the periappendiceal fat; extraluminal gas is seen in the right lower quadrant.

## 2024-01-22 NOTE — H&P ADULT - ATTENDING COMMENTS
Patient seen and examined. History, physical examination and imaging are consistent with severe appendicitis with peritonitis in the setting of Factor V Leiden deficiency requiring lifelong anticoagulation. He may require surgical intervention given appendicolith, but is at high risk for thromboembolism. Discussed with patient potential IVC filter.    Continue supportive care in SICU  Will need AC bridge  Antibiotics  Please obtain Echo/Dopplers for baseline  Potential surgery tomorrow, open vs MIS approach, appendectomy vs colectomy for source control.

## 2024-01-22 NOTE — ED ADULT NURSE NOTE - CHIEF COMPLAINT
The patient is a 38y Male complaining of abdominal pain. Secondary Intention Text (Leave Blank If You Do Not Want): The defect will heal with secondary intention.

## 2024-01-22 NOTE — ED PROVIDER NOTE - PHYSICAL EXAMINATION
Vitals reviewed  Gen: moderate painful distress, speaking in full sentences, no hypoxia  Skin: wwp, no rash/lesions  HEENT: ncat, eomi, mmm, airway patent   CV: +s1/2, tachycardia, regular rhythm, no audible m/r/g  Resp: symmetrical expansion, ctab, no w/r/r  Abd: obese abd w/ RUQ/RLQ/LLQ ttp, voluntary guarding, no rebound, no cvat  Ext: FROM throughout, no peripheral edema, no muscle or joint ttp, distal pulses 2+  Neuro: alert/oriented, no focal deficits, steady gait

## 2024-01-22 NOTE — ED ADULT NURSE NOTE - NSFALLUNIVINTERV_ED_ALL_ED
Bed/Stretcher in lowest position, wheels locked, appropriate side rails in place/Call bell, personal items and telephone in reach/Instruct patient to call for assistance before getting out of bed/chair/stretcher/Non-slip footwear applied when patient is off stretcher/[place] to call system/Physically safe environment - no spills, clutter or unnecessary equipment/Purposeful proactive rounding/Room/bathroom lighting operational, light cord in reach

## 2024-01-22 NOTE — CONSULT NOTE ADULT - ASSESSMENT
38 M pmh factor V leiden, h/o PE x 2 s/p thrombectomy 12/2020 on eliquis p/w abd pain, NVD and sob x 5 days. Reports constant sharp progressively worsening lower abd pain w/ loose watery nb stools and nbnb emesis. Able to tolerate xarelto and occasional bland food/soup but not on day of presentation. No prior abd surgeries. Febrile to 102.6, tachycardic, normotensive in ED. CT concerning for perforated appendicitis containing appendicoliths. Transfered to SICU for hemodynamic monitoring in setting of sepsis pending surgical disposition.     Neuro: Pain control Tylenol, dilaudid prn, nausea control zofran prn  CV: MAP >65. Hx Factor V Leiden. Holding home eliquis. Vascular to place IVC filter prior to OR.  Pulm: Satting well on RA. Hx multiple PEs (s/p thrombectomy 2020)  GI: NPO. Perforated appendectomy planned for RA ileocecectomy 1-24-23. PPI. IVF NS@150  : Voids  ID: Zosyn (1/22-)  Endo: mISS  PPx: Heparin gtt in the setting of Factor V Leiden, PE x2, holding home Eliquis   Lines: PIVs  PT/OT: not ordered

## 2024-01-22 NOTE — CONSULT NOTE ADULT - SUBJECTIVE AND OBJECTIVE BOX
SURGERY CONSULT  ==============================================================================================================  HPI: 38y Male with hx of Factor V Leiden deficiency with multiple DVT/PEs (most recently 2020 s/p thrombectomy), on [facility], admitted for acute complicated appendicitis. Vascular Surgery consulted for IVC filter placement in anticipation of possible operative intervention       PAST MEDICAL & SURGICAL HISTORY:  Pulmonary embolism      Factor V Leiden      No significant past surgical history        Home Meds: Home Medications:    Allergies: Allergies    No Known Allergies    Intolerances      Soc:   Advanced Directives: Presumed Full Code     CURRENT MEDICATIONS:   --------------------------------------------------------------------------------------  Neurologic Medications  acetaminophen   IVPB .. 1000 milliGRAM(s) IV Intermittent every 6 hours  HYDROmorphone  Injectable 0.5 milliGRAM(s) IV Push every 4 hours PRN Moderate Pain (4 - 6)  HYDROmorphone  Injectable 1 milliGRAM(s) IV Push every 4 hours PRN Severe Pain (7 - 10)  ondansetron Injectable 4 milliGRAM(s) IV Push every 6 hours PRN Vomiting    Respiratory Medications    Cardiovascular Medications    Gastrointestinal Medications  dextrose 5%. 1000 milliLiter(s) IV Continuous <Continuous>  lactated ringers. 1000 milliLiter(s) IV Continuous <Continuous>  pantoprazole  Injectable 40 milliGRAM(s) IV Push daily    Genitourinary Medications    Hematologic/Oncologic Medications    Antimicrobial/Immunologic Medications  piperacillin/tazobactam IVPB.. 3.375 Gram(s) IV Intermittent every 8 hours    Endocrine/Metabolic Medications  dextrose 50% Injectable 25 Gram(s) IV Push once  dextrose 50% Injectable 25 Gram(s) IV Push once  dextrose Oral Gel 15 Gram(s) Oral once PRN Blood Glucose LESS THAN 70 milliGRAM(s)/deciliter  glucagon  Injectable 1 milliGRAM(s) IntraMuscular once  insulin lispro (ADMELOG) corrective regimen sliding scale   SubCutaneous Before meals and at bedtime    Topical/Other Medications  chlorhexidine 2% Cloths 1 Application(s) Topical <User Schedule>    --------------------------------------------------------------------------------------    VITAL SIGNS, INS/OUTS (last 24 hours):  --------------------------------------------------------------------------------------  ICU Vital Signs Last 24 Hrs  T(C): 37.2 (2024 14:37), Max: 39.2 (2024 11:00)  T(F): 99 (2024 14:37), Max: 102.6 (2024 11:00)  HR: 91 (2024 14:37) (91 - 118)  BP: 117/78 (2024 14:37) (99/59 - 124/74)  BP(mean): --  ABP: --  ABP(mean): --  RR: 18 (2024 14:37) (18 - 20)  SpO2: 99% (2024 14:37) (95% - 99%)    O2 Parameters below as of 2024 12:32  Patient On (Oxygen Delivery Method): room air          I&O's Summary    --------------------------------------------------------------------------------------    EXAM:  General: NAD, resting comfortably in bed   C/V: NSR   Pulm: Nonlabored breathing, no respiratory distress on RA   Abd: soft, rotund, mild to moderate RLQ ttp without rebound or guarding   Extrem: WWP, no edema, SCDs in place   Neuro: A&O x 3, no gross deficits      LABS  --------------------------------------------------------------------------------------  Labs:  CAPILLARY BLOOD GLUCOSE                              14.5   26.45 )-----------( 342      ( 2024 09:38 )             44.5       Auto Neutrophil %: 76.5 % (24 @ 09:38)        133<L>  |  98  |  5<L>  ----------------------------<  227<H>  4.2   |  23  |  0.74      Calcium: 9.5 mg/dL (24 @ 09:38)      LFTs:             7.9  | 1.4  | 29       ------------------[98      ( 2024 09:38 )  4.0  | 0.3  | 69          Lipase:13     Amylase:x         Lactate, Blood: 1.9 mmol/L (24 @ 09:38)      Coags:            Urinalysis Basic - ( 2024 09:38 )    Color: Orange / Appearance: Cloudy / S.023 / pH: x  Gluc: 227 mg/dL / Ketone: 80 mg/dL  / Bili: Small / Urobili: 1.0 mg/dL   Blood: x / Protein: 100 mg/dL / Nitrite: Negative   Leuk Esterase: Trace / RBC: 4 /HPF / WBC 8 /HPF   Sq Epi: x / Non Sq Epi: 6 /HPF / Bacteria: Moderate /HPF        Urinalysis with Rflx Culture (collected 2024 09:38)        --------------------------------------------------------------------------------------     VASCULAR SURGERY CONSULT  ==============================================================================================================  HPI: 38y Male with hx of Factor V Leiden deficiency with multiple DVT/PEs (most recently 2020 s/p thrombectomy), on [facility], admitted for acute complicated appendicitis. Vascular Surgery consulted for IVC filter placement in anticipation of possible operative intervention       PAST MEDICAL & SURGICAL HISTORY:  Pulmonary embolism      Factor V Leiden      No significant past surgical history        Home Meds: Home Medications:    Allergies: Allergies    No Known Allergies    Intolerances      Soc:   Advanced Directives: Presumed Full Code     CURRENT MEDICATIONS:   --------------------------------------------------------------------------------------  Neurologic Medications  acetaminophen   IVPB .. 1000 milliGRAM(s) IV Intermittent every 6 hours  HYDROmorphone  Injectable 0.5 milliGRAM(s) IV Push every 4 hours PRN Moderate Pain (4 - 6)  HYDROmorphone  Injectable 1 milliGRAM(s) IV Push every 4 hours PRN Severe Pain (7 - 10)  ondansetron Injectable 4 milliGRAM(s) IV Push every 6 hours PRN Vomiting    Respiratory Medications    Cardiovascular Medications    Gastrointestinal Medications  dextrose 5%. 1000 milliLiter(s) IV Continuous <Continuous>  lactated ringers. 1000 milliLiter(s) IV Continuous <Continuous>  pantoprazole  Injectable 40 milliGRAM(s) IV Push daily    Genitourinary Medications    Hematologic/Oncologic Medications    Antimicrobial/Immunologic Medications  piperacillin/tazobactam IVPB.. 3.375 Gram(s) IV Intermittent every 8 hours    Endocrine/Metabolic Medications  dextrose 50% Injectable 25 Gram(s) IV Push once  dextrose 50% Injectable 25 Gram(s) IV Push once  dextrose Oral Gel 15 Gram(s) Oral once PRN Blood Glucose LESS THAN 70 milliGRAM(s)/deciliter  glucagon  Injectable 1 milliGRAM(s) IntraMuscular once  insulin lispro (ADMELOG) corrective regimen sliding scale   SubCutaneous Before meals and at bedtime    Topical/Other Medications  chlorhexidine 2% Cloths 1 Application(s) Topical <User Schedule>    --------------------------------------------------------------------------------------    VITAL SIGNS, INS/OUTS (last 24 hours):  --------------------------------------------------------------------------------------  ICU Vital Signs Last 24 Hrs  T(C): 37.2 (2024 14:37), Max: 39.2 (2024 11:00)  T(F): 99 (2024 14:37), Max: 102.6 (2024 11:00)  HR: 91 (2024 14:37) (91 - 118)  BP: 117/78 (2024 14:37) (99/59 - 124/74)  BP(mean): --  ABP: --  ABP(mean): --  RR: 18 (2024 14:37) (18 - 20)  SpO2: 99% (2024 14:37) (95% - 99%)    O2 Parameters below as of 2024 12:32  Patient On (Oxygen Delivery Method): room air          I&O's Summary    --------------------------------------------------------------------------------------    EXAM:  General: NAD, resting comfortably in bed   C/V: NSR   Pulm: Nonlabored breathing, no respiratory distress on RA   Abd: soft, rotund, mild to moderate RLQ ttp without rebound or guarding   Extrem: WWP, no edema, SCDs in place   Neuro: A&O x 3, no gross deficits      LABS  --------------------------------------------------------------------------------------  Labs:  CAPILLARY BLOOD GLUCOSE                              14.5   26.45 )-----------( 342      ( 2024 09:38 )             44.5       Auto Neutrophil %: 76.5 % (24 @ 09:38)        133<L>  |  98  |  5<L>  ----------------------------<  227<H>  4.2   |  23  |  0.74      Calcium: 9.5 mg/dL (24 @ 09:38)      LFTs:             7.9  | 1.4  | 29       ------------------[98      ( 2024 09:38 )  4.0  | 0.3  | 69          Lipase:13     Amylase:x         Lactate, Blood: 1.9 mmol/L (24 @ 09:38)      Coags:            Urinalysis Basic - ( 2024 09:38 )    Color: Orange / Appearance: Cloudy / S.023 / pH: x  Gluc: 227 mg/dL / Ketone: 80 mg/dL  / Bili: Small / Urobili: 1.0 mg/dL   Blood: x / Protein: 100 mg/dL / Nitrite: Negative   Leuk Esterase: Trace / RBC: 4 /HPF / WBC 8 /HPF   Sq Epi: x / Non Sq Epi: 6 /HPF / Bacteria: Moderate /HPF        Urinalysis with Rflx Culture (collected 2024 09:38)        --------------------------------------------------------------------------------------

## 2024-01-22 NOTE — CONSULT NOTE ADULT - SUBJECTIVE AND OBJECTIVE BOX
HPI:  37yo Male pt with PMH factor V Leiden presenting with multiple PEs (most recently 2020 s/p thrombectomy) now with 4-day history of abdominal pain. Patient states pain started in b/l LQ and now localized to the right. Had nausea and 1 episode of NBNB emesis. 2 watery bowel movements, passing flatus. Fever on presentation, chills at home. Mild SOB which he attributed to pain.    Last colonoscopy/EGD - none  Denies family hx of IBS, Crohn's, UC, or colon cancer.    In the ED, pt febrile to 39.2, tachycardic to 118, normotensive. On exam, abdomen is soft, rotund, mild to moderate ttp in RLQ. Labs significant for WBC 26.45, H/H 14.5/44.5, Na 133, BUN/Cr 5/0.74. CT AP with PO and IV contrast demonstrates appendix is dilated and contains multiple appendicoliths measuring up to 2 cm; there is appendiceal wall thickening and extensive infiltration of the periappendiceal fat; extraluminal gas is seen in the right lower quadrant.   (22 Jan 2024 14:20)      PAST MEDICAL & SURGICAL HISTORY:  Pulmonary embolism  Factor V Leiden  No significant past surgical history  ROS: See HPI    MEDICATIONS  (STANDING):  acetaminophen   IVPB .. 1000 milliGRAM(s) IV Intermittent every 6 hours  chlorhexidine 2% Cloths 1 Application(s) Topical <User Schedule>  dextrose 5%. 1000 milliLiter(s) (50 mL/Hr) IV Continuous <Continuous>  dextrose 50% Injectable 25 Gram(s) IV Push once  dextrose 50% Injectable 25 Gram(s) IV Push once  emtricitabine 200 mG/tenofovir 300 mG (TRUVADA) 1 Tablet(s) Oral daily  glucagon  Injectable 1 milliGRAM(s) IntraMuscular once  heparin  Infusion. 2200 Unit(s)/Hr (24 mL/Hr) IV Continuous <Continuous>  influenza   Vaccine 0.5 milliLiter(s) IntraMuscular once  insulin lispro (ADMELOG) corrective regimen sliding scale   SubCutaneous Before meals and at bedtime  pantoprazole  Injectable 40 milliGRAM(s) IV Push daily  piperacillin/tazobactam IVPB.. 3.375 Gram(s) IV Intermittent every 8 hours  sodium chloride 0.9%. 1000 milliLiter(s) (150 mL/Hr) IV Continuous <Continuous>    MEDICATIONS  (PRN):  dextrose Oral Gel 15 Gram(s) Oral once PRN Blood Glucose LESS THAN 70 milliGRAM(s)/deciliter  HYDROmorphone  Injectable 0.5 milliGRAM(s) IV Push every 4 hours PRN Moderate Pain (4 - 6)  HYDROmorphone  Injectable 1 milliGRAM(s) IV Push every 4 hours PRN Severe Pain (7 - 10)  ondansetron Injectable 4 milliGRAM(s) IV Push every 6 hours PRN Vomiting      Allergies    No Known Allergies    Intolerances        SOCIAL HISTORY:  Smoke: Never Smoker  EtOH: occasional    FAMILY HISTORY:      Vital Signs Last 24 Hrs  T(C): 36.5 (22 Jan 2024 21:29), Max: 39.2 (22 Jan 2024 11:00)  T(F): 97.7 (22 Jan 2024 21:29), Max: 102.6 (22 Jan 2024 11:00)  HR: 76 (22 Jan 2024 23:00) (75 - 118)  BP: 106/57 (22 Jan 2024 23:00) (99/59 - 131/69)  BP(mean): 77 (22 Jan 2024 23:00) (75 - 92)  RR: 17 (22 Jan 2024 23:00) (13 - 24)  SpO2: 92% (22 Jan 2024 23:00) (92% - 100%)    Parameters below as of 22 Jan 2024 23:00  Patient On (Oxygen Delivery Method): room air        PHYSICAL EXAM  Gen: NAD   Neuro: A&oX3 no neuro deficits  HEENT: moist mucous membranes, lingual thrush?, uvula, tongue midline   CV: RRR reg s1s2 no M  Pulm: CTA B/L no w/w/r  Abd: Soft, non distended with moderate to severe ttp mostly in the RLQ, some voluntary guarding, no rebound.   Ext: No C/C/E  Skin: No rashes erythema or ecchymosis  MSK: No joint swelling noted, extremities well perfused   Psych: Normal affect     LABS:                        12.3   23.32 )-----------( 273      ( 22 Jan 2024 22:16 )             37.7     01-22    134<L>  |  101  |  5<L>  ----------------------------<  185<H>  3.7   |  22  |  0.64    Ca    8.6      22 Jan 2024 15:31  Phos  2.6     01-22  Mg     1.8     01-22    TPro  7.1  /  Alb  3.3  /  TBili  1.4<H>  /  DBili  0.4<H>  /  AST  24  /  ALT  58<H>  /  AlkPhos  89  01-22    PT/INR - ( 22 Jan 2024 15:31 )   PT: 16.3 sec;   INR: 1.45          PTT - ( 22 Jan 2024 22:16 )  PTT:56.8 sec  Urinalysis Basic - ( 22 Jan 2024 15:31 )    Color: x / Appearance: x / SG: x / pH: x  Gluc: 185 mg/dL / Ketone: x  / Bili: x / Urobili: x   Blood: x / Protein: x / Nitrite: x   Leuk Esterase: x / RBC: x / WBC x   Sq Epi: x / Non Sq Epi: x / Bacteria: x        RADIOLOGY & ADDITIONAL STUDIES:    Assessment and Plan:  38yMale

## 2024-01-22 NOTE — ED ADULT NURSE NOTE - OBJECTIVE STATEMENT
P Presents for c/o abd pain to RLQ x 4 days with nausea, po intolerance, vomiting. Denies blood in stool or emesis.

## 2024-01-22 NOTE — ED PROVIDER NOTE - WHICH SHOWED
Callback Number:  [phone]  PCP:  Dr Anabelle Julian    Onset:  Mom will run out of formula today   Location / description: Patient takes Enfamil, (yellow labe) Neuro Pro.  Mom wants recommendation for another formula, mom cannot get current formula    -- DO NOT REPLY / DO NOT REPLY ALL --  -- Message is from the [facility]--    Provider paged via [employer] Documentation - The below message was copied and pasted from a [employer] page:    4/30/2022 9:55:18 AM   [facility] Center[facility] N[facility]SE [phone]   Lalit Leger MD 0\" tooltip-popup-delay=\"500\" xkcvxzg-skejwx-uj-body=\"true\" uib-tooltip=\"\" tooltip-placement=\"auto top\" tooltip-enable=\"false\" psx-ellipsis=\"\"Anabelle Julian MD   Secure Text   [phone] PATIENT NUMBER -------------------------------- [facility] NURSE LINE (IF QUESTIONS ONLY - [phone]) URGENT FROM: JEANNETTE COLE REQUESTED DR:ANABELLE JULIAN MOM CALLED [facility], CANNOT GET PATIENT'S FORMULA AND WOULD LIKE A RECOMMENDATION FOR ANOTHER, STATES SHE WILL RUN OUT OF THE CURRENT FORMULA, ENFAMIL NEURO PRO (YELLOW LABEL) TODAY. PLS CALL MOM [phone]        sinus tach 110 bpm. normal axis. no stemi.

## 2024-01-22 NOTE — ED PROVIDER NOTE - ATTENDING APP SHARED VISIT CONTRIBUTION OF CARE
Pt is a 39yo m, h/o factor 5 leiden, pe on xarelto, who p/w abd pain, n/v/d, sob x 5d. Pain to lower abd, constant, getting worse, a/w loose, non-bloody stools, nausea and nbnb emesis. Has been able to tolerate occasional soup and medications. + sob x 3d. No cp, leg pain, swelling, uri sx's, fever, chills. Afebrile. Mildly tachycardic. VS otherwise stable. PE as above. Pt appears uncomfortable. + diffuse lower and ruq tenderness, some guarding, no rebound. Lungs cta b/l. + s1, s2, rrr. Plan for labs, iv hydration, antiemetics, morphine for pain, ct a/p, cxr. Will continue to monitor.

## 2024-01-22 NOTE — CONSULT NOTE ADULT - TIME BILLING
More than 50 percent of which was spent on counseling and coordination of care. More than 50 percent of which was spent on counseling and coordination of care

## 2024-01-22 NOTE — ED ADULT TRIAGE NOTE - CHIEF COMPLAINT QUOTE
pt presents to ER c/o abdominal pain, n/v and diarrhea x4 days. also c/o occasional sob. hx PE, currently taking eliquis.

## 2024-01-22 NOTE — PATIENT PROFILE ADULT - TOBACCO USE
Never smoker Rhofade Counseling: Rhofade is a topical medication which can decrease superficial blood flow where applied. Side effects are uncommon and include stinging, redness and allergic reactions.

## 2024-01-22 NOTE — ED PROVIDER NOTE - OBJECTIVE STATEMENT
38 M pmh factor V leiden, h/o PE x 2 s/p thrombectomy 12/2020 on xarelto p/w abd pain, NVD and sob x 5 days.  pt reports constant sharp progressively worsening lower abd pain w/ loose watery nb stools and nbnb emesis.  able to tolerate xarelto and occasional bland food/soup but not today.  did not take anything for pain.  no prior abd surgeries. also w/ sob that is worse when pain increases since Saturday; no chest pain.  denies f/c, HA, fainting, palpitations, constipation, melena, dysuria, hematuria, testicular pain, penile pain/discharge, sick contacts, trauma

## 2024-01-22 NOTE — PATIENT PROFILE ADULT - FUNCTIONAL ASSESSMENT - BASIC MOBILITY ASSESSMENT TYPE
Admission Patient seen and examined. Case discussed with ACP.     58W HTN HL DM CRI COPD on home O2, obesity, hypothyroidism, chronic DVT, fibromyalgia on methadone, NICM EF 25-30% with ICD, MM, prior CVA who presented with mild hemoptysis, chronic dry cough and acute on chronic anemia. Renal consulted for suboptimal diuresis. Pulm consulted for hemoptysis--no further eval recommended, apixaban restarted. Yesterday with low grade temp and proteus in sputum cx. Was somnolent on exam. Started on abx and psych meds removed per psych discussion. RLE Doppler with DVT. Switched from apixaban to heparin gtt while being evaluated by vascular.   Today much more awake, conversive, reports feeling much better. Reports breathing stable. O2 requirements back to home level.   Pt has Cardiomems device and was following with CHF team here.   -recommend Advanced HF consult and to obtain Cardiomems reading for better assessment of filling pressures  -Cr higher today -- discussed with renal, will continue current diuretic dose as O2 requirements improved with that (they think may need higher diuretic dosing but given improved O2 requirements, would favor maintaining current until review with advanced CHF team)  -f/u vascular recs re: concern for RLE DVT -- a/c switched from apixaban to hep gtt -- will need to clarify apixaban dose if this is indeed a new DVT on apixaban 2.5 bid (limited by renal function for higher dose)  -per vascular also to discuss with pt's outpt heme/onc Dr. Ruiz (follows for MM and anemia)  -appreciate psych recs  -strict Is/Os  -continue beta blocker, Entresto  -continue ceftriaxone    Marie Partida MD

## 2024-01-22 NOTE — H&P ADULT - NSHPLABSRESULTS_GEN_ALL_CORE
14.5   26.45 )-----------( 342      ( 22 Jan 2024 09:38 )             44.5   01-22    133<L>  |  98  |  5<L>  ----------------------------<  227<H>  4.2   |  23  |  0.74    Ca    9.5      22 Jan 2024 09:38    TPro  7.9  /  Alb  4.0  /  TBili  1.4<H>  /  DBili  0.3  /  AST  29  /  ALT  69<H>  /  AlkPhos  98  01-22        INTERPRETATION:  CLINICAL INFORMATION: Abdominal pain    COMPARISON: CT angiogram chest 10/20/2020    CONTRAST/COMPLICATIONS:  IV Contrast: Isovue 370  94 cc administered   6 cc discarded  Oral Contrast: Omnipaque 350  Complications: None reported at time of study completion    PROCEDURE:  CT of the Abdomen and Pelvis was performed.  Sagittal and coronal reformats were performed.    FINDINGS:  LOWER CHEST: No acute abnormality.    LIVER: Hepatic steatosis.  BILE DUCTS: Normal caliber.  GALLBLADDER: Within normal limits.  SPLEEN: Within normal limits.  PANCREAS: Within normal limits.  ADRENALS: Within normal limits.  KIDNEYS/URETERS: Within normal limits.    BLADDER: Within normal limits.  REPRODUCTIVE ORGANS: Prostate within normal limits.    BOWEL: No bowel obstruction. Appendix is dilated and contains multiple   appendicoliths measuring up to 2 cm. There is appendiceal wall thickening   and extensive infiltration of the periappendiceal fat. Extraluminal gas   is seen in the right lower quadrant. No drainable fluid collection is   evident. There is wall thickening of midportion of sigmoid colon adjacent   to the appendicitis, likely reactive.  PERITONEUM: Trace ascites.  VESSELS: Within normal limits.  RETROPERITONEUM/LYMPH NODES: No lymphadenopathy.  ABDOMINAL WALL: Small fat-containing umbilical hernia.  BONES: Within normal limits.    IMPRESSION:    Perforated appendicitis containing appendicoliths.    Wall thickening of sigmoid colon likely reactive to appendicitis.

## 2024-01-22 NOTE — CONSULT NOTE ADULT - ASSESSMENT
38y Male with hx of Factor V Leiden deficiency with multiple DVT/PEs (most recently 2020 s/p thrombectomy), on Eliquis, admitted for acute complicated (perforated) appendicitis. Vascular Surgery consulted for IVC filter placement in anticipation of possible operative intervention. Patient admitted to SICU for resuscitation and conservative management, tentative plan for possible operative intervention per Gen Surg this admission should conservative management fail     Recommendations   - Continue anticoagulation   - Vascular will be available for intra-op IVC placement as needed   - Rest of care per primary & SICU     D/w chief resident  38y Male with hx of Factor V Leiden deficiency with multiple DVT/PEs (most recently 2020 s/p thrombectomy), on Eliquis, admitted for acute complicated (perforated) appendicitis. Vascular Surgery consulted for IVC filter placement in anticipation of possible operative intervention. Patient admitted to SICU for resuscitation and trial of non-operative management, tentative plan for appendectomy per Gen Surg this admission should non-op management fail     Recommendations   - Continue anticoagulation   - Vascular will be available for intra-op IVC placement as needed   - Rest of care per primary & SICU     D/w chief resident

## 2024-01-22 NOTE — CONSULT NOTE ADULT - ATTENDING COMMENTS
This was originally a consult for Dr. Elizabeth Núñez, but I was asked to take over this consult. Mr. Percy Garces is a 38M w/ Factor V Leiden deficiency  w/ multiple episodes of DVT/PE (most recently 2020 s/p thrombectomy, denied having COVID at the time),  now on Eliquis, admitted for acute appendicitis w/ perforation. BMI 41.4. Vascular surgery was consulted for IVC filter placement prior to general surgery intervention today,    On exam, he is A&Ox3, mildly uncomfortable, normal resp effort, abdomen soft, mild to moderate RLQ ttp without rebound, faintly palp pedal pulses, motor and sensation intact, calves non-tender.    PLAN & RECOMMENDATIONS  - Can place retrievable IVC filter intraop prior to general surgery procedure on 1/23/24  - Restart therapeutic AC once safe to do so  - DVT ppx with SCDs and possible SQH or ppx lovenox when safe  - Can eventually remove IVC filter as an outpatient in a few weeks once recovers from general surgery procedure and consistently back on AC    Thank you,    Gen Serrano MD  Attending Vascular Surgeon  [facility]  [address] [place] [address]  Office: [phone]; Fax: [phone]  [email or web address] This was originally a consult for Dr. Elizabeth Núñez, but I was asked to take over this consult. Mr. Percy Garces is a 38M w/ Factor V Leiden deficiency  w/ multiple episodes of DVT/PE (most recently 2020 s/p thrombectomy, denied having COVID at the time),  now on Eliquis, admitted for acute appendicitis w/ perforation. BMI 41.4. Vascular surgery was consulted for IVC filter placement prior to general surgery intervention today,    On exam, he is A&Ox3, mildly uncomfortable, normal resp effort, abdomen soft, mild to moderate RLQ ttp without rebound, faintly palp pedal pulses, motor and sensation intact, calves non-tender.    I do not see IVC or iliac vein thrombus on CT scan that was done this admission.     PLAN & RECOMMENDATIONS  - Given his hypercoagulable state, hx of DVT/PE and inability to anticoagulate in periop period, we can place retrievable IVC filter intraop prior to general surgery procedure on 1/23/24. I explained the risks, benefits and alternatives.  - Restart therapeutic AC once safe to do so  - DVT ppx with SCDs and possible SQH or ppx lovenox when safe  - BLE Venous duplex ultrasound  - Can eventually remove IVC filter as an outpatient in a few weeks once recovers from general surgery procedure and consistently back on AC    Thank you,    Gen Serrano MD  Attending Vascular Surgeon  [facility] at [address] [place] [address]  Office: [phone]; Fax: [phone]  [email or web address]

## 2024-01-22 NOTE — ED PROVIDER NOTE - PROGRESS NOTE DETAILS
+ sirs, febrile.  will obtain cultures, given ideal wt based ivf, abx and tyelnol.  pending CT perf appendicitis.  surg to admit to sicu

## 2024-01-22 NOTE — H&P ADULT - ASSESSMENT
39yo Male pt with PMH factor V Leiden presenting with multiple PEs (most recently 2020 s/p thrombectomy) now with 4-day history of abdominal pain. Febrile to 39.2, tachycardic to 118, normotensive. On exam, abdomen is soft, rotund, mild to moderate ttp in RLQ. Labs significant for WBC 26.45, H/H 14.5/44.5, Na 133, BUN/Cr 5/0.74. CT AP with PO and IV contrast demonstrates appendix is dilated and contains multiple appendicoliths measuring up to 2 cm; there is appendiceal wall thickening and extensive infiltration of the periappendiceal fat; extraluminal gas is seen in the right lower quadrant.    Presentation consistent with sepsis 2/2 perforated appendicitis  Admit to general surgery, team 4, SICU, Dr. Tashi Davila  Hep gtt  Vascular consult for possible IVC filter placement given possibility of failure of conservative management  SCDs/OOBA/IS  LE Duplex, ECHO  AM labs

## 2024-01-23 ENCOUNTER — RESULT REVIEW (OUTPATIENT)
Age: 39
End: 2024-01-23

## 2024-01-23 ENCOUNTER — TRANSCRIPTION ENCOUNTER (OUTPATIENT)
Age: 39
End: 2024-01-23

## 2024-01-23 LAB
ALBUMIN SERPL ELPH-MCNC: 3.3 G/DL — SIGNIFICANT CHANGE UP (ref 3.3–5)
ALP SERPL-CCNC: 99 U/L — SIGNIFICANT CHANGE UP (ref 40–120)
ALT FLD-CCNC: 46 U/L — HIGH (ref 10–45)
ANION GAP SERPL CALC-SCNC: 11 MMOL/L — SIGNIFICANT CHANGE UP (ref 5–17)
ANION GAP SERPL CALC-SCNC: 8 MMOL/L — SIGNIFICANT CHANGE UP (ref 5–17)
APTT BLD: 56.9 SEC — HIGH (ref 24.5–35.6)
AST SERPL-CCNC: 20 U/L — SIGNIFICANT CHANGE UP (ref 10–40)
BILIRUB SERPL-MCNC: 0.9 MG/DL — SIGNIFICANT CHANGE UP (ref 0.2–1.2)
BLD GP AB SCN SERPL QL: NEGATIVE — SIGNIFICANT CHANGE UP
BUN SERPL-MCNC: 4 MG/DL — LOW (ref 7–23)
BUN SERPL-MCNC: 5 MG/DL — LOW (ref 7–23)
CALCIUM SERPL-MCNC: 8 MG/DL — LOW (ref 8.4–10.5)
CALCIUM SERPL-MCNC: 8 MG/DL — LOW (ref 8.4–10.5)
CHLORIDE SERPL-SCNC: 104 MMOL/L — SIGNIFICANT CHANGE UP (ref 96–108)
CHLORIDE SERPL-SCNC: 104 MMOL/L — SIGNIFICANT CHANGE UP (ref 96–108)
CO2 SERPL-SCNC: 20 MMOL/L — LOW (ref 22–31)
CO2 SERPL-SCNC: 24 MMOL/L — SIGNIFICANT CHANGE UP (ref 22–31)
CREAT SERPL-MCNC: 0.56 MG/DL — SIGNIFICANT CHANGE UP (ref 0.5–1.3)
CREAT SERPL-MCNC: 0.57 MG/DL — SIGNIFICANT CHANGE UP (ref 0.5–1.3)
EGFR: 128 ML/MIN/1.73M2 — SIGNIFICANT CHANGE UP
EGFR: 129 ML/MIN/1.73M2 — SIGNIFICANT CHANGE UP
GLUCOSE BLDC GLUCOMTR-MCNC: 148 MG/DL — HIGH (ref 70–99)
GLUCOSE BLDC GLUCOMTR-MCNC: 155 MG/DL — HIGH (ref 70–99)
GLUCOSE BLDC GLUCOMTR-MCNC: 185 MG/DL — HIGH (ref 70–99)
GLUCOSE SERPL-MCNC: 185 MG/DL — HIGH (ref 70–99)
GLUCOSE SERPL-MCNC: 202 MG/DL — HIGH (ref 70–99)
HCT VFR BLD CALC: 38.1 % — LOW (ref 39–50)
HCT VFR BLD CALC: 40.6 % — SIGNIFICANT CHANGE UP (ref 39–50)
HGB BLD-MCNC: 12.4 G/DL — LOW (ref 13–17)
HGB BLD-MCNC: 13 G/DL — SIGNIFICANT CHANGE UP (ref 13–17)
HIV 1+2 AB+HIV1 P24 AG SERPL QL IA: SIGNIFICANT CHANGE UP
INR BLD: 1.36 — HIGH (ref 0.85–1.18)
MAGNESIUM SERPL-MCNC: 2.1 MG/DL — SIGNIFICANT CHANGE UP (ref 1.6–2.6)
MAGNESIUM SERPL-MCNC: 2.2 MG/DL — SIGNIFICANT CHANGE UP (ref 1.6–2.6)
MCHC RBC-ENTMCNC: 26.6 PG — LOW (ref 27–34)
MCHC RBC-ENTMCNC: 27 PG — SIGNIFICANT CHANGE UP (ref 27–34)
MCHC RBC-ENTMCNC: 32 GM/DL — SIGNIFICANT CHANGE UP (ref 32–36)
MCHC RBC-ENTMCNC: 32.5 GM/DL — SIGNIFICANT CHANGE UP (ref 32–36)
MCV RBC AUTO: 82.8 FL — SIGNIFICANT CHANGE UP (ref 80–100)
MCV RBC AUTO: 83 FL — SIGNIFICANT CHANGE UP (ref 80–100)
NRBC # BLD: 0 /100 WBCS — SIGNIFICANT CHANGE UP (ref 0–0)
NRBC # BLD: 0 /100 WBCS — SIGNIFICANT CHANGE UP (ref 0–0)
PHOSPHATE SERPL-MCNC: 1.9 MG/DL — LOW (ref 2.5–4.5)
PHOSPHATE SERPL-MCNC: 2.3 MG/DL — LOW (ref 2.5–4.5)
PLATELET # BLD AUTO: 261 K/UL — SIGNIFICANT CHANGE UP (ref 150–400)
PLATELET # BLD AUTO: 270 K/UL — SIGNIFICANT CHANGE UP (ref 150–400)
POTASSIUM SERPL-MCNC: 3.5 MMOL/L — SIGNIFICANT CHANGE UP (ref 3.5–5.3)
POTASSIUM SERPL-MCNC: 4 MMOL/L — SIGNIFICANT CHANGE UP (ref 3.5–5.3)
POTASSIUM SERPL-SCNC: 3.5 MMOL/L — SIGNIFICANT CHANGE UP (ref 3.5–5.3)
POTASSIUM SERPL-SCNC: 4 MMOL/L — SIGNIFICANT CHANGE UP (ref 3.5–5.3)
PROT SERPL-MCNC: 6.6 G/DL — SIGNIFICANT CHANGE UP (ref 6–8.3)
PROTHROM AB SERPL-ACNC: 15.4 SEC — HIGH (ref 9.5–13)
RBC # BLD: 4.6 M/UL — SIGNIFICANT CHANGE UP (ref 4.2–5.8)
RBC # BLD: 4.89 M/UL — SIGNIFICANT CHANGE UP (ref 4.2–5.8)
RBC # FLD: 12.8 % — SIGNIFICANT CHANGE UP (ref 10.3–14.5)
RBC # FLD: 13.1 % — SIGNIFICANT CHANGE UP (ref 10.3–14.5)
RH IG SCN BLD-IMP: POSITIVE — SIGNIFICANT CHANGE UP
SODIUM SERPL-SCNC: 135 MMOL/L — SIGNIFICANT CHANGE UP (ref 135–145)
SODIUM SERPL-SCNC: 136 MMOL/L — SIGNIFICANT CHANGE UP (ref 135–145)
WBC # BLD: 15.4 K/UL — HIGH (ref 3.8–10.5)
WBC # BLD: 20.67 K/UL — HIGH (ref 3.8–10.5)
WBC # FLD AUTO: 15.4 K/UL — HIGH (ref 3.8–10.5)
WBC # FLD AUTO: 20.67 K/UL — HIGH (ref 3.8–10.5)

## 2024-01-23 PROCEDURE — 99223 1ST HOSP IP/OBS HIGH 75: CPT | Mod: GC,24

## 2024-01-23 PROCEDURE — 93306 TTE W/DOPPLER COMPLETE: CPT | Mod: 26

## 2024-01-23 PROCEDURE — 99233 SBSQ HOSP IP/OBS HIGH 50: CPT | Mod: GC

## 2024-01-23 PROCEDURE — 37191 INS ENDOVAS VENA CAVA FILTR: CPT | Mod: GC

## 2024-01-23 PROCEDURE — 88304 TISSUE EXAM BY PATHOLOGIST: CPT | Mod: 26

## 2024-01-23 DEVICE — XI STAPLER SUREFORM RELOAD 60 BLUE: Type: IMPLANTABLE DEVICE | Status: FUNCTIONAL

## 2024-01-23 DEVICE — STAPLER COVIDIEN TRI-STAPLE 45MM PURPLE RELOAD: Type: IMPLANTABLE DEVICE | Status: FUNCTIONAL

## 2024-01-23 DEVICE — FILTER VENA CAVA FEMORAL: Type: IMPLANTABLE DEVICE | Status: FUNCTIONAL

## 2024-01-23 DEVICE — STAPLER COVIDIEN PURSTRING 65MM: Type: IMPLANTABLE DEVICE | Status: FUNCTIONAL

## 2024-01-23 DEVICE — XI STAPLER SUREFORM RELOAD 60 GREEN: Type: IMPLANTABLE DEVICE | Status: FUNCTIONAL

## 2024-01-23 DEVICE — STAPLER COVIDIEN DST EEA 31MM 4.8MM: Type: IMPLANTABLE DEVICE | Status: FUNCTIONAL

## 2024-01-23 DEVICE — GWIRE BENT STRT 0.035INX150CM: Type: IMPLANTABLE DEVICE | Status: FUNCTIONAL

## 2024-01-23 DEVICE — STAPLER COVIDIEN EEA CIRCULAR DST 28MM 4.5MM BLUE: Type: IMPLANTABLE DEVICE | Status: FUNCTIONAL

## 2024-01-23 RX ORDER — BENZOCAINE AND MENTHOL 5; 1 G/100ML; G/100ML
1 LIQUID ORAL
Refills: 0 | Status: DISCONTINUED | OUTPATIENT
Start: 2024-01-23 | End: 2024-01-27

## 2024-01-23 RX ORDER — INSULIN LISPRO 100/ML
VIAL (ML) SUBCUTANEOUS
Refills: 0 | Status: DISCONTINUED | OUTPATIENT
Start: 2024-01-23 | End: 2024-01-27

## 2024-01-23 RX ORDER — ACETAMINOPHEN 500 MG
1000 TABLET ORAL ONCE
Refills: 0 | Status: COMPLETED | OUTPATIENT
Start: 2024-01-23 | End: 2024-01-23

## 2024-01-23 RX ORDER — KETOROLAC TROMETHAMINE 30 MG/ML
15 SYRINGE (ML) INJECTION EVERY 6 HOURS
Refills: 0 | Status: DISCONTINUED | OUTPATIENT
Start: 2024-01-23 | End: 2024-01-26

## 2024-01-23 RX ORDER — SODIUM CHLORIDE 9 MG/ML
1000 INJECTION INTRAMUSCULAR; INTRAVENOUS; SUBCUTANEOUS ONCE
Refills: 0 | Status: COMPLETED | OUTPATIENT
Start: 2024-01-23 | End: 2024-01-23

## 2024-01-23 RX ORDER — DEXTROSE 50 % IN WATER 50 %
25 SYRINGE (ML) INTRAVENOUS ONCE
Refills: 0 | Status: DISCONTINUED | OUTPATIENT
Start: 2024-01-23 | End: 2024-01-27

## 2024-01-23 RX ORDER — CHLORHEXIDINE GLUCONATE 213 G/1000ML
1 SOLUTION TOPICAL DAILY
Refills: 0 | Status: DISCONTINUED | OUTPATIENT
Start: 2024-01-23 | End: 2024-01-24

## 2024-01-23 RX ORDER — ACETAMINOPHEN 500 MG
1000 TABLET ORAL EVERY 6 HOURS
Refills: 0 | Status: DISCONTINUED | OUTPATIENT
Start: 2024-01-23 | End: 2024-01-27

## 2024-01-23 RX ORDER — SODIUM,POTASSIUM PHOSPHATES 278-250MG
1 POWDER IN PACKET (EA) ORAL ONCE
Refills: 0 | Status: COMPLETED | OUTPATIENT
Start: 2024-01-23 | End: 2024-01-23

## 2024-01-23 RX ORDER — KETAMINE HYDROCHLORIDE 100 MG/ML
50 INJECTION INTRAMUSCULAR; INTRAVENOUS ONCE
Refills: 0 | Status: COMPLETED | OUTPATIENT
Start: 2024-01-23 | End: 2024-01-23

## 2024-01-23 RX ORDER — SODIUM CHLORIDE 9 MG/ML
1000 INJECTION, SOLUTION INTRAVENOUS
Refills: 0 | Status: DISCONTINUED | OUTPATIENT
Start: 2024-01-23 | End: 2024-01-24

## 2024-01-23 RX ORDER — DIPHENHYDRAMINE HCL 50 MG
50 CAPSULE ORAL ONCE
Refills: 0 | Status: COMPLETED | OUTPATIENT
Start: 2024-01-23 | End: 2024-01-23

## 2024-01-23 RX ORDER — SODIUM CHLORIDE 9 MG/ML
1000 INJECTION, SOLUTION INTRAVENOUS ONCE
Refills: 0 | Status: COMPLETED | OUTPATIENT
Start: 2024-01-23 | End: 2024-01-23

## 2024-01-23 RX ORDER — PIPERACILLIN AND TAZOBACTAM 4; .5 G/20ML; G/20ML
3.38 INJECTION, POWDER, LYOPHILIZED, FOR SOLUTION INTRAVENOUS EVERY 8 HOURS
Refills: 0 | Status: DISCONTINUED | OUTPATIENT
Start: 2024-01-23 | End: 2024-01-27

## 2024-01-23 RX ORDER — GLUCAGON INJECTION, SOLUTION 0.5 MG/.1ML
1 INJECTION, SOLUTION SUBCUTANEOUS ONCE
Refills: 0 | Status: DISCONTINUED | OUTPATIENT
Start: 2024-01-23 | End: 2024-01-27

## 2024-01-23 RX ORDER — HYDROMORPHONE HYDROCHLORIDE 2 MG/ML
0.5 INJECTION INTRAMUSCULAR; INTRAVENOUS; SUBCUTANEOUS EVERY 4 HOURS
Refills: 0 | Status: DISCONTINUED | OUTPATIENT
Start: 2024-01-23 | End: 2024-01-24

## 2024-01-23 RX ORDER — ONDANSETRON 8 MG/1
4 TABLET, FILM COATED ORAL EVERY 6 HOURS
Refills: 0 | Status: DISCONTINUED | OUTPATIENT
Start: 2024-01-23 | End: 2024-01-27

## 2024-01-23 RX ORDER — CHLORHEXIDINE GLUCONATE 213 G/1000ML
15 SOLUTION TOPICAL EVERY 12 HOURS
Refills: 0 | Status: DISCONTINUED | OUTPATIENT
Start: 2024-01-23 | End: 2024-01-23

## 2024-01-23 RX ORDER — SODIUM CHLORIDE 9 MG/ML
1000 INJECTION, SOLUTION INTRAVENOUS
Refills: 0 | Status: DISCONTINUED | OUTPATIENT
Start: 2024-01-23 | End: 2024-01-27

## 2024-01-23 RX ORDER — DEXTROSE 50 % IN WATER 50 %
15 SYRINGE (ML) INTRAVENOUS ONCE
Refills: 0 | Status: DISCONTINUED | OUTPATIENT
Start: 2024-01-23 | End: 2024-01-27

## 2024-01-23 RX ORDER — POTASSIUM PHOSPHATE, MONOBASIC POTASSIUM PHOSPHATE, DIBASIC 236; 224 MG/ML; MG/ML
30 INJECTION, SOLUTION INTRAVENOUS ONCE
Refills: 0 | Status: COMPLETED | OUTPATIENT
Start: 2024-01-23 | End: 2024-01-23

## 2024-01-23 RX ORDER — HEPARIN SODIUM 5000 [USP'U]/ML
7500 INJECTION INTRAVENOUS; SUBCUTANEOUS EVERY 8 HOURS
Refills: 0 | Status: DISCONTINUED | OUTPATIENT
Start: 2024-01-23 | End: 2024-01-25

## 2024-01-23 RX ORDER — HEPARIN SODIUM 5000 [USP'U]/ML
2600 INJECTION INTRAVENOUS; SUBCUTANEOUS
Qty: 25000 | Refills: 0 | Status: DISCONTINUED | OUTPATIENT
Start: 2024-01-23 | End: 2024-01-23

## 2024-01-23 RX ORDER — EMTRICITABINE AND TENOFOVIR DISOPROXIL FUMARATE 200; 300 MG/1; MG/1
1 TABLET, FILM COATED ORAL DAILY
Refills: 0 | Status: DISCONTINUED | OUTPATIENT
Start: 2024-01-23 | End: 2024-01-27

## 2024-01-23 RX ORDER — PANTOPRAZOLE SODIUM 20 MG/1
40 TABLET, DELAYED RELEASE ORAL DAILY
Refills: 0 | Status: DISCONTINUED | OUTPATIENT
Start: 2024-01-23 | End: 2024-01-27

## 2024-01-23 RX ORDER — DEXTROSE 50 % IN WATER 50 %
12.5 SYRINGE (ML) INTRAVENOUS ONCE
Refills: 0 | Status: DISCONTINUED | OUTPATIENT
Start: 2024-01-23 | End: 2024-01-27

## 2024-01-23 RX ADMIN — HYDROMORPHONE HYDROCHLORIDE 1 MILLIGRAM(S): 2 INJECTION INTRAMUSCULAR; INTRAVENOUS; SUBCUTANEOUS at 05:55

## 2024-01-23 RX ADMIN — Medication 1000 MILLIGRAM(S): at 18:30

## 2024-01-23 RX ADMIN — Medication 1000 MILLIGRAM(S): at 05:55

## 2024-01-23 RX ADMIN — HYDROMORPHONE HYDROCHLORIDE 1 MILLIGRAM(S): 2 INJECTION INTRAMUSCULAR; INTRAVENOUS; SUBCUTANEOUS at 01:30

## 2024-01-23 RX ADMIN — Medication 400 MILLIGRAM(S): at 05:40

## 2024-01-23 RX ADMIN — Medication 400 MILLIGRAM(S): at 00:09

## 2024-01-23 RX ADMIN — SODIUM CHLORIDE 1000 MILLILITER(S): 9 INJECTION INTRAMUSCULAR; INTRAVENOUS; SUBCUTANEOUS at 20:21

## 2024-01-23 RX ADMIN — SODIUM CHLORIDE 2000 MILLILITER(S): 9 INJECTION, SOLUTION INTRAVENOUS at 20:20

## 2024-01-23 RX ADMIN — Medication 1000 MILLIGRAM(S): at 23:29

## 2024-01-23 RX ADMIN — HYDROMORPHONE HYDROCHLORIDE 1 MILLIGRAM(S): 2 INJECTION INTRAMUSCULAR; INTRAVENOUS; SUBCUTANEOUS at 05:40

## 2024-01-23 RX ADMIN — Medication 15 MILLIGRAM(S): at 19:28

## 2024-01-23 RX ADMIN — SODIUM CHLORIDE 150 MILLILITER(S): 9 INJECTION INTRAMUSCULAR; INTRAVENOUS; SUBCUTANEOUS at 07:01

## 2024-01-23 RX ADMIN — Medication 15 MILLIGRAM(S): at 23:29

## 2024-01-23 RX ADMIN — HEPARIN SODIUM 7500 UNIT(S): 5000 INJECTION INTRAVENOUS; SUBCUTANEOUS at 19:37

## 2024-01-23 RX ADMIN — Medication 2: at 07:00

## 2024-01-23 RX ADMIN — HEPARIN SODIUM 26 UNIT(S)/HR: 5000 INJECTION INTRAVENOUS; SUBCUTANEOUS at 07:01

## 2024-01-23 RX ADMIN — SODIUM CHLORIDE 100 MILLILITER(S): 9 INJECTION, SOLUTION INTRAVENOUS at 13:26

## 2024-01-23 RX ADMIN — PIPERACILLIN AND TAZOBACTAM 25 GRAM(S): 4; .5 INJECTION, POWDER, LYOPHILIZED, FOR SOLUTION INTRAVENOUS at 23:29

## 2024-01-23 RX ADMIN — PIPERACILLIN AND TAZOBACTAM 25 GRAM(S): 4; .5 INJECTION, POWDER, LYOPHILIZED, FOR SOLUTION INTRAVENOUS at 00:09

## 2024-01-23 RX ADMIN — PIPERACILLIN AND TAZOBACTAM 25 GRAM(S): 4; .5 INJECTION, POWDER, LYOPHILIZED, FOR SOLUTION INTRAVENOUS at 07:00

## 2024-01-23 RX ADMIN — CHLORHEXIDINE GLUCONATE 1 APPLICATION(S): 213 SOLUTION TOPICAL at 05:40

## 2024-01-23 RX ADMIN — POTASSIUM PHOSPHATE, MONOBASIC POTASSIUM PHOSPHATE, DIBASIC 83.33 MILLIMOLE(S): 236; 224 INJECTION, SOLUTION INTRAVENOUS at 13:29

## 2024-01-23 RX ADMIN — HYDROMORPHONE HYDROCHLORIDE 0.5 MILLIGRAM(S): 2 INJECTION INTRAMUSCULAR; INTRAVENOUS; SUBCUTANEOUS at 16:25

## 2024-01-23 RX ADMIN — Medication 1000 MILLIGRAM(S): at 00:15

## 2024-01-23 RX ADMIN — PANTOPRAZOLE SODIUM 40 MILLIGRAM(S): 20 TABLET, DELAYED RELEASE ORAL at 13:27

## 2024-01-23 RX ADMIN — Medication 1 PACKET(S): at 16:11

## 2024-01-23 RX ADMIN — EMTRICITABINE AND TENOFOVIR DISOPROXIL FUMARATE 1 TABLET(S): 200; 300 TABLET, FILM COATED ORAL at 19:37

## 2024-01-23 RX ADMIN — HYDROMORPHONE HYDROCHLORIDE 0.5 MILLIGRAM(S): 2 INJECTION INTRAMUSCULAR; INTRAVENOUS; SUBCUTANEOUS at 16:11

## 2024-01-23 RX ADMIN — Medication 2: at 16:13

## 2024-01-23 RX ADMIN — Medication 15 MILLIGRAM(S): at 17:49

## 2024-01-23 RX ADMIN — HYDROMORPHONE HYDROCHLORIDE 1 MILLIGRAM(S): 2 INJECTION INTRAMUSCULAR; INTRAVENOUS; SUBCUTANEOUS at 01:45

## 2024-01-23 RX ADMIN — Medication 1000 MILLIGRAM(S): at 17:49

## 2024-01-23 RX ADMIN — PIPERACILLIN AND TAZOBACTAM 25 GRAM(S): 4; .5 INJECTION, POWDER, LYOPHILIZED, FOR SOLUTION INTRAVENOUS at 14:07

## 2024-01-23 RX ADMIN — Medication 50 MILLIGRAM(S): at 13:27

## 2024-01-23 NOTE — PROGRESS NOTE ADULT - ASSESSMENT
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     CLD  Pain/nausea control prn  Zosyn  SQH, holding full anticoag, IVC filter in place, SCDs  dispo: SICU

## 2024-01-23 NOTE — BRIEF OPERATIVE NOTE - NSICDXBRIEFPREOP_GEN_ALL_CORE_FT
PRE-OP DIAGNOSIS:  Perforated appendicitis 23-Jan-2024 12:57:38  Nela Amaya  
PRE-OP DIAGNOSIS:  Factor V Leiden mutation 23-Jan-2024 10:19:40  Bernice Bull  History of pulmonary embolism 23-Jan-2024 10:20:15  Bernice Bull  History of deep vein thrombosis 23-Jan-2024 10:20:27  Bernice Bull

## 2024-01-23 NOTE — BRIEF OPERATIVE NOTE - NSICDXBRIEFPOSTOP_GEN_ALL_CORE_FT
POST-OP DIAGNOSIS:  Perforated appendicitis 23-Jan-2024 12:57:49  Nela Amaya  
POST-OP DIAGNOSIS:  Factor V Leiden mutation 23-Jan-2024 10:20:57  Bernice Bull  History of pulmonary embolism 23-Jan-2024 10:21:07  Bernice Bull  History of deep vein thrombosis 23-Jan-2024 10:21:18  Bernice Bull

## 2024-01-23 NOTE — BRIEF OPERATIVE NOTE - OPERATION/FINDINGS
Bilateral groins prepped and draped. Micropuncture needle used to access right CFA under ultrasound guidance. Guidewire inserted and position confirmed with Fluoro. Dilation of vessel performed. Venogram shot. 0.035" 180 cm Bentson guidewire inserted and then sheath was inserted over guidewire. Intraluminal position of sheath confirmed with Venography. Optimal position beneath left renal artery and confluence of iliac veins was identified. Catheter was inserted and advanced to optimal position and retrievable 28 mm Cook IVC filter was deployed in infrarenal IVC. Satisfactory position was confirmed with Venography. Catheter was removed and direct pressure was held over left groin for 10 minutes. Puncture site closed with Dermabond and covered with sterile 4 x 4 gauze and Tegaderm Bilateral groins prepped and draped. Micropuncture needle used to access right CFA under ultrasound guidance. Guidewire inserted and position confirmed with Fluoro. Dilation of vessel performed. Venogram shot. 0.035" 180 cm Bentson guidewire inserted and then sheath was inserted over guidewire. Intraluminal position of sheath confirmed with Venography. Optimal position beneath left renal artery and confluence of iliac veins was identified. Catheter was inserted and advanced to optimal position and retrievable 28 mm Cook IVC filter was deployed in infrarenal IVC. Satisfactory position was confirmed with Venography. Catheter was removed and direct pressure was held over right groin for 10 minutes. Puncture site closed with Dermabond and covered with sterile 4 x 4 gauze and Tegaderm

## 2024-01-23 NOTE — PROGRESS NOTE ADULT - ASSESSMENT
38 M pmh factor V leiden, h/o PE x 2 s/p thrombectomy 12/2020 on eliquis p/w abd pain, NVD and sob x 5 days, CT concerning for perforated appendicitis w/ appendocoliths, transferred to SICU for hemodynamic monitoring in setting of sepsis.    Plan  OR today for Robotic Ileocectomy, IVC Filter placement  NPO/IVF  Pain/nausea control prn  Hep gtt, PTT pending  SCDs, OOBA, IS  Rest of care per SICU

## 2024-01-23 NOTE — PROGRESS NOTE ADULT - SUBJECTIVE AND OBJECTIVE BOX
SUBJECTIVE:      MEDICATIONS  (STANDING):  acetaminophen     Tablet .. 1000 milliGRAM(s) Oral every 6 hours  dextrose 5%. 1000 milliLiter(s) (50 mL/Hr) IV Continuous <Continuous>  dextrose 5%. 1000 milliLiter(s) (100 mL/Hr) IV Continuous <Continuous>  dextrose 50% Injectable 25 Gram(s) IV Push once  dextrose 50% Injectable 12.5 Gram(s) IV Push once  dextrose 50% Injectable 25 Gram(s) IV Push once  emtricitabine 200 mG/tenofovir 300 mG (TRUVADA) 1 Tablet(s) Oral daily  glucagon  Injectable 1 milliGRAM(s) IntraMuscular once  influenza   Vaccine 0.5 milliLiter(s) IntraMuscular once  insulin lispro (ADMELOG) corrective regimen sliding scale   SubCutaneous Before meals and at bedtime  ketorolac   Injectable 15 milliGRAM(s) IV Push every 6 hours  lactated ringers. 1000 milliLiter(s) (100 mL/Hr) IV Continuous <Continuous>  pantoprazole  Injectable 40 milliGRAM(s) IV Push daily  piperacillin/tazobactam IVPB.. 3.375 Gram(s) IV Intermittent every 8 hours  potassium phosphate / sodium phosphate Powder (PHOS-NaK) 1 Packet(s) Oral once    MEDICATIONS  (PRN):  benzocaine/menthol Lozenge 1 Lozenge Oral every 2 hours PRN Sore Throat  dextrose Oral Gel 15 Gram(s) Oral once PRN Blood Glucose LESS THAN 70 milliGRAM(s)/deciliter  HYDROmorphone  Injectable 0.5 milliGRAM(s) IV Push every 4 hours PRN Severe Pain (7 - 10)  ondansetron Injectable 4 milliGRAM(s) IV Push every 6 hours PRN Nausea and/or Vomiting      Vital Signs Last 24 Hrs  T(C): 36.7 (23 Jan 2024 13:00), Max: 37.5 (23 Jan 2024 06:12)  T(F): 98.1 (23 Jan 2024 13:00), Max: 99.5 (23 Jan 2024 06:12)  HR: 100 (23 Jan 2024 15:00) (70 - 100)  BP: 117/63 (23 Jan 2024 15:00) (92/50 - 131/69)  BP(mean): 83 (23 Jan 2024 15:00) (65 - 92)  RR: 27 (23 Jan 2024 15:00) (10 - 27)  SpO2: 94% (23 Jan 2024 15:00) (89% - 100%)    Parameters below as of 23 Jan 2024 15:00  Patient On (Oxygen Delivery Method): nasal cannula  O2 Flow (L/min): 2      Physical Exam:  General: NAD, resting comfortably in bed  Pulmonary: Nonlabored breathing, no respiratory distress  Cardiovascular: NSR  Abdominal: soft, NT/ND  Extremities: WWP, normal strength  Neuro: A/O x 3, CNs II-XII grossly intact, no focal deficits    I&O's Summary    22 Jan 2024 07:01  -  23 Jan 2024 07:00  --------------------------------------------------------  IN: 3479 mL / OUT: 950 mL / NET: 2529 mL    23 Jan 2024 07:01  -  23 Jan 2024 15:24  --------------------------------------------------------  IN: 666.6 mL / OUT: 220 mL / NET: 446.6 mL        LABS:                        13.0   15.40 )-----------( 270      ( 23 Jan 2024 13:02 )             40.6     01-23    135  |  104  |  5<L>  ----------------------------<  202<H>  4.0   |  20<L>  |  0.56    Ca    8.0<L>      23 Jan 2024 13:02  Phos  1.9     01-23  Mg     2.1     01-23    TPro  6.6  /  Alb  3.3  /  TBili  0.9  /  DBili  x   /  AST  20  /  ALT  46<H>  /  AlkPhos  99  01-23    PT/INR - ( 23 Jan 2024 03:37 )   PT: 15.4 sec;   INR: 1.36          PTT - ( 23 Jan 2024 03:37 )  PTT:56.9 sec  Urinalysis Basic - ( 23 Jan 2024 13:02 )    Color: x / Appearance: x / SG: x / pH: x  Gluc: 202 mg/dL / Ketone: x  / Bili: x / Urobili: x   Blood: x / Protein: x / Nitrite: x   Leuk Esterase: x / RBC: x / WBC x   Sq Epi: x / Non Sq Epi: x / Bacteria: x      CAPILLARY BLOOD GLUCOSE      POCT Blood Glucose.: 185 mg/dL (23 Jan 2024 06:50)  POCT Blood Glucose.: 189 mg/dL (22 Jan 2024 21:38)    LIVER FUNCTIONS - ( 23 Jan 2024 03:37 )  Alb: 3.3 g/dL / Pro: 6.6 g/dL / ALK PHOS: 99 U/L / ALT: 46 U/L / AST: 20 U/L / GGT: x             RADIOLOGY & ADDITIONAL STUDIES:   SUBJECTIVE:  Patient seen and examined at bedside s/p IVC filter placement (prior to robotic appendectomy by Gen Surg)   Resting comfortably in bed   Endorsing tiredness, but denied groin or leg pain, weakness, paresthesias, sob   Pain is controlled       MEDICATIONS  (STANDING):  acetaminophen     Tablet .. 1000 milliGRAM(s) Oral every 6 hours  dextrose 5%. 1000 milliLiter(s) (50 mL/Hr) IV Continuous <Continuous>  dextrose 5%. 1000 milliLiter(s) (100 mL/Hr) IV Continuous <Continuous>  dextrose 50% Injectable 25 Gram(s) IV Push once  dextrose 50% Injectable 12.5 Gram(s) IV Push once  dextrose 50% Injectable 25 Gram(s) IV Push once  emtricitabine 200 mG/tenofovir 300 mG (TRUVADA) 1 Tablet(s) Oral daily  glucagon  Injectable 1 milliGRAM(s) IntraMuscular once  influenza   Vaccine 0.5 milliLiter(s) IntraMuscular once  insulin lispro (ADMELOG) corrective regimen sliding scale   SubCutaneous Before meals and at bedtime  ketorolac   Injectable 15 milliGRAM(s) IV Push every 6 hours  lactated ringers. 1000 milliLiter(s) (100 mL/Hr) IV Continuous <Continuous>  pantoprazole  Injectable 40 milliGRAM(s) IV Push daily  piperacillin/tazobactam IVPB.. 3.375 Gram(s) IV Intermittent every 8 hours  potassium phosphate / sodium phosphate Powder (PHOS-NaK) 1 Packet(s) Oral once    MEDICATIONS  (PRN):  benzocaine/menthol Lozenge 1 Lozenge Oral every 2 hours PRN Sore Throat  dextrose Oral Gel 15 Gram(s) Oral once PRN Blood Glucose LESS THAN 70 milliGRAM(s)/deciliter  HYDROmorphone  Injectable 0.5 milliGRAM(s) IV Push every 4 hours PRN Severe Pain (7 - 10)  ondansetron Injectable 4 milliGRAM(s) IV Push every 6 hours PRN Nausea and/or Vomiting      Vital Signs Last 24 Hrs  T(C): 36.7 (23 Jan 2024 13:00), Max: 37.5 (23 Jan 2024 06:12)  T(F): 98.1 (23 Jan 2024 13:00), Max: 99.5 (23 Jan 2024 06:12)  HR: 100 (23 Jan 2024 15:00) (70 - 100)  BP: 117/63 (23 Jan 2024 15:00) (92/50 - 131/69)  BP(mean): 83 (23 Jan 2024 15:00) (65 - 92)  RR: 27 (23 Jan 2024 15:00) (10 - 27)  SpO2: 94% (23 Jan 2024 15:00) (89% - 100%)    Parameters below as of 23 Jan 2024 15:00  Patient On (Oxygen Delivery Method): nasal cannula  O2 Flow (L/min): 2      Physical Exam:  General: NAD, resting comfortably in bed  Pulmonary: Nonlabored breathing, no respiratory distress on nasal canula  Cardiovascular: NSR  Abdominal: soft, ND, appropriately tender post-op   Extremities: WWP, normal strength, RLE groin soft and hemostatic with 4 x 4 & Tegaderm dressing   Vascular: Palpable pedal pulses   Neuro: A/O x 3, CNs II-XII grossly intact, no focal deficits      I&O's Summary    22 Jan 2024 07:01  -  23 Jan 2024 07:00  --------------------------------------------------------  IN: 3479 mL / OUT: 950 mL / NET: 2529 mL    23 Jan 2024 07:01  -  23 Jan 2024 15:24  --------------------------------------------------------  IN: 666.6 mL / OUT: 220 mL / NET: 446.6 mL        LABS:                        13.0   15.40 )-----------( 270      ( 23 Jan 2024 13:02 )             40.6     01-23    135  |  104  |  5<L>  ----------------------------<  202<H>  4.0   |  20<L>  |  0.56    Ca    8.0<L>      23 Jan 2024 13:02  Phos  1.9     01-23  Mg     2.1     01-23    TPro  6.6  /  Alb  3.3  /  TBili  0.9  /  DBili  x   /  AST  20  /  ALT  46<H>  /  AlkPhos  99  01-23    PT/INR - ( 23 Jan 2024 03:37 )   PT: 15.4 sec;   INR: 1.36          PTT - ( 23 Jan 2024 03:37 )  PTT:56.9 sec  Urinalysis Basic - ( 23 Jan 2024 13:02 )    Color: x / Appearance: x / SG: x / pH: x  Gluc: 202 mg/dL / Ketone: x  / Bili: x / Urobili: x   Blood: x / Protein: x / Nitrite: x   Leuk Esterase: x / RBC: x / WBC x   Sq Epi: x / Non Sq Epi: x / Bacteria: x      CAPILLARY BLOOD GLUCOSE      POCT Blood Glucose.: 185 mg/dL (23 Jan 2024 06:50)  POCT Blood Glucose.: 189 mg/dL (22 Jan 2024 21:38)    LIVER FUNCTIONS - ( 23 Jan 2024 03:37 )  Alb: 3.3 g/dL / Pro: 6.6 g/dL / ALK PHOS: 99 U/L / ALT: 46 U/L / AST: 20 U/L / GGT: x             RADIOLOGY & ADDITIONAL STUDIES:

## 2024-01-23 NOTE — PROGRESS NOTE ADULT - ASSESSMENT
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 Reports constant sharp progressively worsening lower abd pain w/ loose watery nb stools and nbnb emesis. Able to tolerate xarelto and occasional bland food/soup but not on day of presentation. No prior abd surgeries. Febrile to 102.6, tachycardic, normotensive in ED. CT concerning for perforated appendicitis containing appendicoliths. Transfered to SICU for hemodynamic monitoring in setting of sepsis. Planned for robotic assisted appendectomy and IVC filter placement on 1/23.    Neuro: Pain control Tylenol, dilaudid prn, nausea control zofran prn  CV: MAP >65. Hx Factor V Leiden. Holding home eliquis. Vascular placed IVC filter prior to OR. TTE pending.  Pulm: Satting well on 4L NC. Hx multiple PEs (s/p thrombectomy 2020)  GI: Clear diet. Perforated appendectomy. PPI. IVF NS@150  : Mccullough in place, monitor urine output  ID: Perforated appendicitis: Zosyn (1/22-). Takes prep for prophylaxis: Truvada. Tylenol for fever. Blood cultures drawn  Endo: mISS  PPx: Heparin gtt held, Duplex pending  Lines: PIVs  PT/OT: not ordered

## 2024-01-23 NOTE — PROGRESS NOTE ADULT - SUBJECTIVE AND OBJECTIVE BOX
SUBJECTIVE: Pt seen and examined at bedside this am by surgery team. Patient is lying comfortably in bed, pain controlled with current regimen. Has been NPO for surgery today. Had some nausea this AM, no vomiting. Denies fever, nausea, vomiting, chest pain, and shortness of breath. Patient is passing gas and having bowel movements.     MEDICATIONS  (STANDING):  acetaminophen   IVPB .. 1000 milliGRAM(s) IV Intermittent every 6 hours  chlorhexidine 2% Cloths 1 Application(s) Topical <User Schedule>  dextrose 5%. 1000 milliLiter(s) (50 mL/Hr) IV Continuous <Continuous>  dextrose 50% Injectable 25 Gram(s) IV Push once  dextrose 50% Injectable 25 Gram(s) IV Push once  emtricitabine 200 mG/tenofovir 300 mG (TRUVADA) 1 Tablet(s) Oral daily  glucagon  Injectable 1 milliGRAM(s) IntraMuscular once  heparin  Infusion 2600 Unit(s)/Hr (26 mL/Hr) IV Continuous <Continuous>  influenza   Vaccine 0.5 milliLiter(s) IntraMuscular once  insulin lispro (ADMELOG) corrective regimen sliding scale   SubCutaneous Before meals and at bedtime  pantoprazole  Injectable 40 milliGRAM(s) IV Push daily  piperacillin/tazobactam IVPB.. 3.375 Gram(s) IV Intermittent every 8 hours  potassium phosphate IVPB 30 milliMole(s) IV Intermittent once  sodium chloride 0.9%. 1000 milliLiter(s) (150 mL/Hr) IV Continuous <Continuous>    MEDICATIONS  (PRN):  dextrose Oral Gel 15 Gram(s) Oral once PRN Blood Glucose LESS THAN 70 milliGRAM(s)/deciliter  HYDROmorphone  Injectable 0.5 milliGRAM(s) IV Push every 4 hours PRN Moderate Pain (4 - 6)  HYDROmorphone  Injectable 1 milliGRAM(s) IV Push every 4 hours PRN Severe Pain (7 - 10)  ondansetron Injectable 4 milliGRAM(s) IV Push every 6 hours PRN Vomiting      Vital Signs Last 24 Hrs  T(C): 37.5 (23 Jan 2024 06:12), Max: 39.2 (22 Jan 2024 11:00)  T(F): 99.5 (23 Jan 2024 06:12), Max: 102.6 (22 Jan 2024 11:00)  HR: 81 (23 Jan 2024 06:00) (70 - 118)  BP: 105/55 (23 Jan 2024 06:00) (92/50 - 131/69)  BP(mean): 75 (23 Jan 2024 06:00) (65 - 92)  RR: 20 (23 Jan 2024 06:00) (10 - 24)  SpO2: 89% (23 Jan 2024 06:00) (89% - 100%)    Parameters below as of 23 Jan 2024 07:00  Patient On (Oxygen Delivery Method): room air    Physical Exam  General: Patient is doing well and lying in bed comfortably  Constitutional: alert and oriented   Pulm: Nonlabored breathing, no respiratory distress  CV: Regular rate and rhythm, normal sinus rhythm  Abd:  soft, mildly tender to palpation, primarily RLQ, nondistended. No rebound, no guarding.   Extremities: warm, well perfused, no edema      I&O's Detail    22 Jan 2024 07:01  -  23 Jan 2024 06:58  --------------------------------------------------------  IN:    Heparin: 78 mL    Heparin Infusion: 132 mL    Heparin Infusion: 144 mL    IV PiggyBack: 225 mL    IV PiggyBack: 200 mL    IV PiggyBack: 300 mL    sodium chloride 0.9%: 2400 mL  Total IN: 3479 mL    OUT:    Voided (mL): 950 mL  Total OUT: 950 mL    Total NET: 2529 mL        LABS:                        12.4   20.67 )-----------( 261      ( 23 Jan 2024 03:37 )             38.1     01-23    136  |  104  |  4<L>  ----------------------------<  185<H>  3.5   |  24  |  0.57    Ca    8.0<L>      23 Jan 2024 03:37  Phos  2.3     01-23  Mg     2.2     01-23    TPro  6.6  /  Alb  3.3  /  TBili  0.9  /  DBili  x   /  AST  20  /  ALT  46<H>  /  AlkPhos  99  01-23    PT/INR - ( 23 Jan 2024 03:37 )   PT: 15.4 sec;   INR: 1.36          PTT - ( 23 Jan 2024 03:37 )  PTT:56.9 sec  Urinalysis Basic - ( 23 Jan 2024 03:37 )    Color: x / Appearance: x / SG: x / pH: x  Gluc: 185 mg/dL / Ketone: x  / Bili: x / Urobili: x   Blood: x / Protein: x / Nitrite: x   Leuk Esterase: x / RBC: x / WBC x   Sq Epi: x / Non Sq Epi: x / Bacteria: x

## 2024-01-23 NOTE — PROGRESS NOTE ADULT - ASSESSMENT
38 M pmh factor V leiden, h/o PE x 2 s/p thrombectomy 12/2020 on eliquis p/w abd pain, NVD and sob x 5 days. Reports constant sharp progressively worsening lower abd pain w/ loose watery nb stools and nbnb emesis. Able to tolerate xarelto and occasional bland food/soup but not on day of presentation. No prior abd surgeries. Febrile to 102.6, tachycardic, normotensive in ED. CT concerning for perforated appendicitis containing appendicoliths. Transfered to SICU for hemodynamic monitoring in setting of sepsis, s/p robotic appendectomy.     Neuro: Pain control Tylenol, dilaudid prn, nausea control zofran prn  CV: MAP >65. Hx Factor V Leiden. Holding home eliquis. Vascular to place IVC filter prior to OR. TTE pending.  Pulm: Satting well on RA. Hx multiple PEs (s/p thrombectomy 2020)  GI: NPO. Perforated appendectomy. PPI. IVF NS@150  : Voids  ID: Perforated appendicitis: Zosyn (1/22-). Takes prep for prophylaxis: Truvada.   Endo: mISS  PPx: Heparin gtt, Duplex pending  Lines: PIVs  PT/OT: not ordered 38 M pmh factor V leiden, h/o PE x 2 s/p thrombectomy 12/2020 on eliquis p/w abd pain, NVD and sob x 5 days. Reports constant sharp progressively worsening lower abd pain w/ loose watery nb stools and nbnb emesis. Able to tolerate xarelto and occasional bland food/soup but not on day of presentation. No prior abd surgeries. Febrile to 102.6, tachycardic, normotensive in ED. CT concerning for perforated appendicitis containing appendicoliths. Transfered to SICU for hemodynamic monitoring in setting of sepsis. Planned for robotic assisted appendectomy and IVC filter placement on 1/23.    Neuro: Pain control Tylenol, dilaudid prn, nausea control zofran prn  CV: MAP >65. Hx Factor V Leiden. Holding home eliquis. Vascular to place IVC filter prior to OR. TTE pending.  Pulm: Satting well on RA. Hx multiple PEs (s/p thrombectomy 2020)  GI: NPO. Perforated appendectomy. PPI. IVF NS@150  : Voids  ID: Perforated appendicitis: Zosyn (1/22-). Takes prep for prophylaxis: Truvada.   Endo: mISS  PPx: Heparin gtt, Duplex pending  Lines: PIVs  PT/OT: not ordered

## 2024-01-23 NOTE — PROGRESS NOTE ADULT - SUBJECTIVE AND OBJECTIVE BOX
SUBJECTIVE: Pt seen and examined at bedside this am by surgery team. Patient is lying comfortably in bed, pain controlled with current regimen. Has been NPO for planned surgery. Pain well controlled with PRN dilaudid- endorses mild nausea this AM. Denies fever, nausea, vomiting, chest pain, and shortness of breath. Patient is passing gas and having bowel movements.     PAST MEDICAL & SURGICAL HISTORY:  Pulmonary embolism  Factor V Leiden  No significant past surgical history    ROS: See HPI    MEDICATIONS  (STANDING):  influenza   Vaccine 0.5 milliLiter(s) IntraMuscular once  potassium phosphate IVPB 30 milliMole(s) IV Intermittent once    MEDICATIONS  (PRN):      Allergies    No Known Allergies    Intolerances        SOCIAL HISTORY:  Smoke: Never Smoker  EtOH: occasional    FAMILY HISTORY:      Vital Signs Last 24 Hrs  T(C): 37.5 (23 Jan 2024 08:25), Max: 37.5 (23 Jan 2024 06:12)  T(F): 99.5 (23 Jan 2024 06:12), Max: 99.5 (23 Jan 2024 06:12)  HR: 99 (23 Jan 2024 08:25) (70 - 100)  BP: 112/64 (23 Jan 2024 08:25) (92/50 - 131/69)  BP(mean): 82 (23 Jan 2024 08:25) (65 - 92)  RR: 19 (23 Jan 2024 08:25) (10 - 24)  SpO2: 95% (23 Jan 2024 08:25) (89% - 100%)    Parameters below as of 23 Jan 2024 08:00  Patient On (Oxygen Delivery Method): room air    Physical Exam  General: Patient is doing well and lying in bed comfortably  Constitutional: alert and oriented   Pulm: Nonlabored breathing, no respiratory distress  CV: Regular rate and rhythm, normal sinus rhythm  Abd:  soft, mildly tender to palpation, primarily RLQ, nondistended. No rebound, no guarding.   Extremities: warm, well perfused, no edema  LABS:                        12.4   20.67 )-----------( 261      ( 23 Jan 2024 03:37 )             38.1     01-23    136  |  104  |  4<L>  ----------------------------<  185<H>  3.5   |  24  |  0.57    Ca    8.0<L>      23 Jan 2024 03:37  Phos  2.3     01-23  Mg     2.2     01-23    TPro  6.6  /  Alb  3.3  /  TBili  0.9  /  DBili  x   /  AST  20  /  ALT  46<H>  /  AlkPhos  99  01-23    PT/INR - ( 23 Jan 2024 03:37 )   PT: 15.4 sec;   INR: 1.36          PTT - ( 23 Jan 2024 03:37 )  PTT:56.9 sec  Urinalysis Basic - ( 23 Jan 2024 03:37 )    Color: x / Appearance: x / SG: x / pH: x  Gluc: 185 mg/dL / Ketone: x  / Bili: x / Urobili: x   Blood: x / Protein: x / Nitrite: x   Leuk Esterase: x / RBC: x / WBC x   Sq Epi: x / Non Sq Epi: x / Bacteria: x    RADIOLOGY & ADDITIONAL STUDIES:

## 2024-01-23 NOTE — PROGRESS NOTE ADULT - ASSESSMENT
38y Male with hx of Factor V Leiden deficiency with multiple DVT/PE (most recently 2020 s/p thrombectomy), on Eliquis, admitted for acute complicated (perforated) appendicitis. Vascular Surgery consulted for IVC filter placement in anticipation of possible operative intervention.  Now s/p IVC filter placement and robotic appendectomy per Gen Surg   R groin soft and hemostatic with dressing in place. Palpable pedal pulses     Plan:   - Will plan for IVC filter retrieval once stable and back on AC   - Dressing over right groin can be removed after 24 hrs   - Care per Primary and SICU

## 2024-01-23 NOTE — BRIEF OPERATIVE NOTE - NSICDXBRIEFPROCEDURE_GEN_ALL_CORE_FT
PROCEDURES:  Robot-assisted appendectomy 23-Jan-2024 12:55:01  Nela Amaya  
PROCEDURES:  Insertion, IVC filter, temporary 23-Jan-2024 10:19:17  Bernice Bull

## 2024-01-23 NOTE — PRE-ANESTHESIA EVALUATION ADULT - NSANTHADDINFOFT_GEN_ALL_CORE
H&P Adult [facility] [Authored: 22-Jan-2024 14:20]- for Visit: [id number], Complete, Revised, Signed in Full, Available to Patient    History and Physical:   Source of Information	Chart(s), Patient       Patient Identity:  · Birth Sex	Male  · Patient's Sexual Orientation	[place]      History of Present Illness:  Reason for Admission: perforated appendicitis  History of Present Illness:   39yo Male pt with PMH factor V Leiden presenting with multiple PEs (most recently 2020 s/p thrombectomy) now with 4-day history of abdominal pain. Patient states pain started in b/l LQ and now localized to the right. Had nausea and 1 episode of NBNB emesis. 2 watery bowel movements, passing flatus. Fever on presentation, chills at home. Mild SOB which he attributed to pain.    Last colonoscopy/EGD - none  Denies family hx of IBS, Crohn's, UC, or colon cancer.    In the ED, pt febrile to 39.2, tachycardic to 118, normotensive. On exam, abdomen is soft, rotund, mild to moderate ttp in RLQ. Labs significant for WBC 26.45, H/H 14.5/44.5, Na 133, BUN/Cr 5/0.74. CT AP with PO and IV contrast demonstrates appendix is dilated and contains multiple appendicoliths measuring up to 2 cm; there is appendiceal wall thickening and extensive infiltration of the periappendiceal fat; extraluminal gas is seen in the right lower quadrant.         Review of Systems:  Other Review of Systems: All other review of systems negative, except as noted in HPI      Allergies and Intolerances:        Allergies:  	No Known Allergies:     Home Medications:   * No Current Medications as of 22-Dec-2020 13:40 documented in Structured Notes  · 	apixaban 5 mg oral tablet: 2 tab(s) orally every 12 hours for the first 6 days and then 1 tablet every 12 hours thereafter.     .    Patient History:    Past Medical, Past Surgical, and Family History:  PAST MEDICAL HISTORY:  Factor V Leiden     Pulmonary embolism.     PAST SURGICAL HISTORY:  No significant past surgical history.     Social History:  · Substance use	No  · Social History (marital status, living situation, occupation, and sexual history)	Non-smoker, doesn't drink, occasional marijuana.     Tobacco Screening:  · Core Measure Site	No    Risk Assessment:    Present on Admission:  Deep Venous Thrombosis	no  Pulmonary Embolus	no     HIV Screening:  · In accordance with [place] State law, we offer every patient who comes to our ED an HIV test. Would you like to be tested today?	Opt out    Physical Exam:   Physical Exam: General: NAD, resting comfortably in bed.  C/V: NSR.  Pulm: Nonlabored breathing, no respiratory distress on RA.  Abd: soft, rotund, mild to moderate RLQ ttp without rebound or guarding.  Extrem: WWP, no edema, SCDs in place.  Neuro: motor/sensory grossly intact, moves all ext . to command and spontaneously.       Labs and Results:  Labs, Radiology, Cardiology, and Other Results: 14.5   26.45 )-----------( 342      ( 22 Jan 2024 09:38 )             44.5   01-22    133<L>  |  98  |  5<L>  ----------------------------<  227<H>  4.2   |  23  |  0.74    Ca    9.5      22 Jan 2024 09:38    TPro  7.9  /  Alb  4.0  /  TBili  1.4<H>  /  DBili  0.3  /  AST  29  /  ALT  69<H>  /  AlkPhos  98  01-22        INTERPRETATION:  CLINICAL INFORMATION: Abdominal pain    COMPARISON: CT angiogram chest 10/20/2020    CONTRAST/COMPLICATIONS:  IV Contrast: Isovue 370  94 cc administered   6 cc discarded  Oral Contrast: Omnipaque 350  Complications: None reported at time of study completion    PROCEDURE:  CT of the Abdomen and Pelvis was performed.  Sagittal and coronal reformats were performed.    FINDINGS:  LOWER CHEST: No acute abnormality.    LIVER: Hepatic steatosis.  BILE DUCTS: Normal caliber.  GALLBLADDER: Within normal limits.  SPLEEN: Within normal limits.  PANCREAS: Within normal limits.  ADRENALS: Within normal limits.  KIDNEYS/URETERS: Within normal limits.    BLADDER: Within normal limits.  REPRODUCTIVE ORGANS: Prostate within normal limits.    BOWEL: No bowel obstruction. Appendix is dilated and contains multiple   appendicoliths measuring up to 2 cm. There is appendiceal wall thickening   and extensive infiltration of the periappendiceal fat. Extraluminal gas   is seen in the right lower quadrant. No drainable fluid collection is   evident. There is wall thickening of midportion of sigmoid colon adjacent   to the appendicitis, likely reactive.  PERITONEUM: Trace ascites.  VESSELS: Within normal limits.  RETROPERITONEUM/LYMPH NODES: No lymphadenopathy.  ABDOMINAL WALL: Small fat-containing umbilical hernia.  BONES: Within normal limits.    IMPRESSION:    Perforated appendicitis containing appendicoliths.    Wall thickening of sigmoid colon likely reactive to appendicitis.    Assessment and Plan:   · Completed VTE Risk Assessment(s)	Surgical Assessment Completed on: 22-Jan-2024 15:20  · Completed VTE Risk Assessment(s)	Refer to the Assessment tab to view/cancel completed assessment.     Assessment:  · Assessment	  39yo Male pt with PMH factor V Leiden presenting with multiple PEs (most recently 2020 s/p thrombectomy) now with 4-day history of abdominal pain. Febrile to 39.2, tachycardic to 118, normotensive. On exam, abdomen is soft, rotund, mild to moderate ttp in RLQ. Labs significant for WBC 26.45, H/H 14.5/44.5, Na 133, BUN/Cr 5/0.74. CT AP with PO and IV contrast demonstrates appendix is dilated and contains multiple appendicoliths measuring up to 2 cm; there is appendiceal wall thickening and extensive infiltration of the periappendiceal fat; extraluminal gas is seen in the right lower quadrant.    Presentation consistent with sepsis 2/2 perforated appendicitis  Admit to general surgery, team 4, SICU, Dr. Tashi Davila  Hep gtt  Vascular consult for possible IVC filter placement given possibility of failure of conservative management  SCDs/OOBA/IS  LE Duplex, ECHO  AM labs             Attestation Statements:  I have personally seen and examined the patient.  I fully participated in the care of this patient.  I have made amendments to the documentation where necessary, and agree with the history, physical exam, and plan as documented by the Resident.     Patient seen and examined. History, physical examination and imaging are consistent with severe appendicitis with peritonitis in the setting of Factor V Leiden deficiency requiring lifelong anticoagulation. He may require surgical intervention given appendicolith, but is at high risk for thromboembolism. Discussed with patient potential IVC filter.    Continue supportive care in SICU  Will need AC bridge  Antibiotics  Please obtain Echo/Dopplers for baseline  Potential surgery tomorrow, open vs MIS approach, appendectomy vs colectomy for source control.       Electronic Signatures:  Darshan Akins)  (Signed 22-Jan-2024 18:26)  	Authored: History of Present Illness, Attestation Statements  	Co-Signer: History and Physical, History of Present Illness, Allergies/Medications, Patient History, Risk Assessment, Physical Exam, Labs and Results, Assessment and Plan  Disha Mendoza)  (Signed 22-Jan-2024 15:22)  	Authored: History and Physical, History of Present Illness, Allergies/Medications, Patient History, Risk Assessment, Physical Exam, Labs and Results, Assessment and Plan      Last Updated: 22-Jan-2024 18:26 by Darshan Akins)

## 2024-01-23 NOTE — PROGRESS NOTE ADULT - SUBJECTIVE AND OBJECTIVE BOX
Interval Events:  Patient seen and examined at bedside. Patient complains of chills      Allergies    No Known Allergies    Intolerances        Vital Signs Last 24 Hrs  T(C): 38.5 (23 Jan 2024 17:02), Max: 40 (23 Jan 2024 17:00)  T(F): 101.3 (23 Jan 2024 17:02), Max: 104 (23 Jan 2024 17:00)  HR: 116 (23 Jan 2024 18:00) (70 - 116)  BP: 118/62 (23 Jan 2024 18:00) (92/50 - 135/63)  BP(mean): 81 (23 Jan 2024 18:00) (65 - 90)  RR: 32 (23 Jan 2024 18:00) (10 - 38)  SpO2: 94% (23 Jan 2024 18:00) (89% - 97%)    Parameters below as of 23 Jan 2024 18:00  Patient On (Oxygen Delivery Method): room air        01-22 @ 07:01  -  01-23 @ 07:00  --------------------------------------------------------  IN: 3479 mL / OUT: 950 mL / NET: 2529 mL    01-23 @ 07:01 - 01-23 @ 18:16  --------------------------------------------------------  IN: 1124.9 mL / OUT: 695 mL / NET: 429.9 mL      01-22 @ 07:01 - 01-23 @ 07:00  --------------------------------------------------------  IN: 3479 mL / OUT: 950 mL / NET: 2529 mL    01-23 @ 07:01 - 01-23 @ 18:16  --------------------------------------------------------  IN: 1124.9 mL / OUT: 695 mL / NET: 429.9 mL        Physical Exam:     Gen: Patient complains of chills. well nourished  Neuro: A&OX3 No deficits  CV:RRR Reg s1s2 noM  Pulm: CTA b/l No w/r/r  Abd: Soft Mild tenderness in RLQ ND + BS  Ext: No C/C/E   Vasc: + DP b/l   Skin: Erythematous rash noted on the abdomen and right lateral thigh.   MSK: No joint swelling  Psych: No signs of anxiety or depression      LABS:      CBC Full  -  ( 23 Jan 2024 13:02 )  WBC Count : 15.40 K/uL  RBC Count : 4.89 M/uL  Hemoglobin : 13.0 g/dL  Hematocrit : 40.6 %  Platelet Count - Automated : 270 K/uL  Mean Cell Volume : 83.0 fl  Mean Cell Hemoglobin : 26.6 pg  Mean Cell Hemoglobin Concentration : 32.0 gm/dL  Auto Neutrophil # : x  Auto Lymphocyte # : x  Auto Monocyte # : x  Auto Eosinophil # : x  Auto Basophil # : x  Auto Neutrophil % : x  Auto Lymphocyte % : x  Auto Monocyte % : x  Auto Eosinophil % : x  Auto Basophil % : x    01-23    135  |  104  |  5<L>  ----------------------------<  202<H>  4.0   |  20<L>  |  0.56    Ca    8.0<L>      23 Jan 2024 13:02  Phos  1.9     01-23  Mg     2.1     01-23    TPro  6.6  /  Alb  3.3  /  TBili  0.9  /  DBili  x   /  AST  20  /  ALT  46<H>  /  AlkPhos  99  01-23    PT/INR - ( 23 Jan 2024 03:37 )   PT: 15.4 sec;   INR: 1.36          PTT - ( 23 Jan 2024 03:37 )  PTT:56.9 sec      Urinalysis Basic - ( 23 Jan 2024 13:02 )    Color: x / Appearance: x / SG: x / pH: x  Gluc: 202 mg/dL / Ketone: x  / Bili: x / Urobili: x   Blood: x / Protein: x / Nitrite: x   Leuk Esterase: x / RBC: x / WBC x   Sq Epi: x / Non Sq Epi: x / Bacteria: x              RADIOLOGY & ADDITIONAL STUDIES (The following images were personally reviewed):          A/p: 38yMale

## 2024-01-23 NOTE — BRIEF OPERATIVE NOTE - OPERATION/FINDINGS
IVC Filter placed by vascular surgery. See operative note for details.     Galan cutdown, placement of robotic ports x 3 in left hemiabdomen. Diagnostic laparoscopy revealed perforated appendicitis with adjacent inflammatory changes in sigmoid colon and ileum. Robot docked. Cecum mobilized with findings of large fecalith. Appendix amputated sharply with ligasure and then orifice stapled with Endo GHASSAN 45mm purple load. Area irrigated with washed with normal saline. Hemostasis achieved. Fascia closed with running #1 PDS. Skin closed with vicryl and monocryl.

## 2024-01-23 NOTE — PROGRESS NOTE ADULT - SUBJECTIVE AND OBJECTIVE BOX
Procedure: Robotic Assisted Appendectomy, IVC Filter placement  Surgeon: Dr. Akins/Dr. Leggett/Dr. Serrano    S: Pt seen and examined at bedside. Patient is lying comfortably in bed with no complaints. Tolerating sip of clears, pain well controlled with current regimen. Patient denies fever, nausea, vomiting, chest pain, and shortness of breath. Patient is not yet passing gas or having bowel movements. Patient has isaac in place.     O:  T(C): 38.5 (01-23-24 @ 17:02), Max: 40 (01-23-24 @ 17:00)  T(F): 101.3 (01-23-24 @ 17:02), Max: 104 (01-23-24 @ 17:00)  HR: 116 (01-23-24 @ 18:00) (104 - 116)  BP: 118/62 (01-23-24 @ 18:00) (118/62 - 135/63)  RR: 32 (01-23-24 @ 18:00) (31 - 38)  SpO2: 94% (01-23-24 @ 18:00) (91% - 94%)  Wt(kg): --                        13.0   15.40 )-----------( 270      ( 23 Jan 2024 13:02 )             40.6     01-23    135  |  104  |  5<L>  ----------------------------<  202<H>  4.0   |  20<L>  |  0.56    Ca    8.0<L>      23 Jan 2024 13:02  Phos  1.9     01-23  Mg     2.1     01-23    TPro  6.6  /  Alb  3.3  /  TBili  0.9  /  DBili  x   /  AST  20  /  ALT  46<H>  /  AlkPhos  99  01-23      Physical Exam  General: Patient is doing well and lying in bed comfortably  Constitutional: alert and oriented   Pulm: Nonlabored breathing, no respiratory distress  CV: Regular rate and rhythm, normal sinus rhythm  Abd:  soft, appropriately tender to palpation, nondistended. No rebound, no guarding.             Incisions: clean, dry, intact, no erythema/induration/edema  Extremities: warm, well perfused, no edema

## 2024-01-24 LAB
ANION GAP SERPL CALC-SCNC: 10 MMOL/L — SIGNIFICANT CHANGE UP (ref 5–17)
APTT BLD: 20.5 SEC — LOW (ref 24.5–35.6)
BUN SERPL-MCNC: 5 MG/DL — LOW (ref 7–23)
CALCIUM SERPL-MCNC: 7.9 MG/DL — LOW (ref 8.4–10.5)
CHLORIDE SERPL-SCNC: 106 MMOL/L — SIGNIFICANT CHANGE UP (ref 96–108)
CO2 SERPL-SCNC: 21 MMOL/L — LOW (ref 22–31)
CREAT SERPL-MCNC: 0.56 MG/DL — SIGNIFICANT CHANGE UP (ref 0.5–1.3)
EGFR: 129 ML/MIN/1.73M2 — SIGNIFICANT CHANGE UP
GLUCOSE BLDC GLUCOMTR-MCNC: 127 MG/DL — HIGH (ref 70–99)
GLUCOSE BLDC GLUCOMTR-MCNC: 144 MG/DL — HIGH (ref 70–99)
GLUCOSE BLDC GLUCOMTR-MCNC: 150 MG/DL — HIGH (ref 70–99)
GLUCOSE BLDC GLUCOMTR-MCNC: 183 MG/DL — HIGH (ref 70–99)
GLUCOSE SERPL-MCNC: 163 MG/DL — HIGH (ref 70–99)
HCT VFR BLD CALC: 40.5 % — SIGNIFICANT CHANGE UP (ref 39–50)
HGB BLD-MCNC: 12.5 G/DL — LOW (ref 13–17)
INR BLD: 1.24 — HIGH (ref 0.85–1.18)
MAGNESIUM SERPL-MCNC: 2.1 MG/DL — SIGNIFICANT CHANGE UP (ref 1.6–2.6)
MCHC RBC-ENTMCNC: 26.5 PG — LOW (ref 27–34)
MCHC RBC-ENTMCNC: 30.9 GM/DL — LOW (ref 32–36)
MCV RBC AUTO: 86 FL — SIGNIFICANT CHANGE UP (ref 80–100)
NRBC # BLD: 0 /100 WBCS — SIGNIFICANT CHANGE UP (ref 0–0)
PHOSPHATE SERPL-MCNC: 2.2 MG/DL — LOW (ref 2.5–4.5)
PLATELET # BLD AUTO: 273 K/UL — SIGNIFICANT CHANGE UP (ref 150–400)
POTASSIUM SERPL-MCNC: 4 MMOL/L — SIGNIFICANT CHANGE UP (ref 3.5–5.3)
POTASSIUM SERPL-SCNC: 4 MMOL/L — SIGNIFICANT CHANGE UP (ref 3.5–5.3)
PROTHROM AB SERPL-ACNC: 14 SEC — HIGH (ref 9.5–13)
RBC # BLD: 4.71 M/UL — SIGNIFICANT CHANGE UP (ref 4.2–5.8)
RBC # FLD: 13 % — SIGNIFICANT CHANGE UP (ref 10.3–14.5)
SODIUM SERPL-SCNC: 137 MMOL/L — SIGNIFICANT CHANGE UP (ref 135–145)
WBC # BLD: 18.73 K/UL — HIGH (ref 3.8–10.5)
WBC # FLD AUTO: 18.73 K/UL — HIGH (ref 3.8–10.5)

## 2024-01-24 PROCEDURE — 99232 SBSQ HOSP IP/OBS MODERATE 35: CPT | Mod: GC

## 2024-01-24 PROCEDURE — 99233 SBSQ HOSP IP/OBS HIGH 50: CPT | Mod: GC,25

## 2024-01-24 RX ORDER — OXYCODONE HYDROCHLORIDE 5 MG/1
5 TABLET ORAL EVERY 6 HOURS
Refills: 0 | Status: DISCONTINUED | OUTPATIENT
Start: 2024-01-24 | End: 2024-01-27

## 2024-01-24 RX ORDER — SODIUM CHLORIDE 9 MG/ML
1000 INJECTION, SOLUTION INTRAVENOUS ONCE
Refills: 0 | Status: COMPLETED | OUTPATIENT
Start: 2024-01-24 | End: 2024-01-24

## 2024-01-24 RX ORDER — SODIUM,POTASSIUM PHOSPHATES 278-250MG
1 POWDER IN PACKET (EA) ORAL ONCE
Refills: 0 | Status: COMPLETED | OUTPATIENT
Start: 2024-01-24 | End: 2024-01-24

## 2024-01-24 RX ORDER — OXYCODONE HYDROCHLORIDE 5 MG/1
10 TABLET ORAL EVERY 6 HOURS
Refills: 0 | Status: DISCONTINUED | OUTPATIENT
Start: 2024-01-24 | End: 2024-01-27

## 2024-01-24 RX ADMIN — HEPARIN SODIUM 7500 UNIT(S): 5000 INJECTION INTRAVENOUS; SUBCUTANEOUS at 13:54

## 2024-01-24 RX ADMIN — PIPERACILLIN AND TAZOBACTAM 25 GRAM(S): 4; .5 INJECTION, POWDER, LYOPHILIZED, FOR SOLUTION INTRAVENOUS at 06:23

## 2024-01-24 RX ADMIN — HEPARIN SODIUM 7500 UNIT(S): 5000 INJECTION INTRAVENOUS; SUBCUTANEOUS at 21:50

## 2024-01-24 RX ADMIN — Medication 15 MILLIGRAM(S): at 07:23

## 2024-01-24 RX ADMIN — HEPARIN SODIUM 7500 UNIT(S): 5000 INJECTION INTRAVENOUS; SUBCUTANEOUS at 06:23

## 2024-01-24 RX ADMIN — Medication 1000 MILLIGRAM(S): at 00:29

## 2024-01-24 RX ADMIN — Medication 15 MILLIGRAM(S): at 11:30

## 2024-01-24 RX ADMIN — OXYCODONE HYDROCHLORIDE 5 MILLIGRAM(S): 5 TABLET ORAL at 16:02

## 2024-01-24 RX ADMIN — Medication 15 MILLIGRAM(S): at 06:23

## 2024-01-24 RX ADMIN — Medication 1000 MILLIGRAM(S): at 06:23

## 2024-01-24 RX ADMIN — Medication 15 MILLIGRAM(S): at 00:29

## 2024-01-24 RX ADMIN — PANTOPRAZOLE SODIUM 40 MILLIGRAM(S): 20 TABLET, DELAYED RELEASE ORAL at 11:30

## 2024-01-24 RX ADMIN — CHLORHEXIDINE GLUCONATE 1 APPLICATION(S): 213 SOLUTION TOPICAL at 11:55

## 2024-01-24 RX ADMIN — Medication 15 MILLIGRAM(S): at 17:12

## 2024-01-24 RX ADMIN — Medication 1000 MILLIGRAM(S): at 17:12

## 2024-01-24 RX ADMIN — Medication 1000 MILLIGRAM(S): at 11:31

## 2024-01-24 RX ADMIN — Medication 1000 MILLIGRAM(S): at 07:23

## 2024-01-24 RX ADMIN — OXYCODONE HYDROCHLORIDE 5 MILLIGRAM(S): 5 TABLET ORAL at 16:27

## 2024-01-24 RX ADMIN — SODIUM CHLORIDE 2000 MILLILITER(S): 9 INJECTION, SOLUTION INTRAVENOUS at 18:44

## 2024-01-24 RX ADMIN — SODIUM CHLORIDE 1000 MILLILITER(S): 9 INJECTION, SOLUTION INTRAVENOUS at 19:23

## 2024-01-24 RX ADMIN — Medication 2: at 21:51

## 2024-01-24 RX ADMIN — Medication 15 MILLIGRAM(S): at 11:32

## 2024-01-24 RX ADMIN — Medication 1 PACKET(S): at 11:32

## 2024-01-24 RX ADMIN — PIPERACILLIN AND TAZOBACTAM 25 GRAM(S): 4; .5 INJECTION, POWDER, LYOPHILIZED, FOR SOLUTION INTRAVENOUS at 13:55

## 2024-01-24 RX ADMIN — EMTRICITABINE AND TENOFOVIR DISOPROXIL FUMARATE 1 TABLET(S): 200; 300 TABLET, FILM COATED ORAL at 11:53

## 2024-01-24 RX ADMIN — PIPERACILLIN AND TAZOBACTAM 25 GRAM(S): 4; .5 INJECTION, POWDER, LYOPHILIZED, FOR SOLUTION INTRAVENOUS at 22:24

## 2024-01-24 RX ADMIN — Medication 1000 MILLIGRAM(S): at 11:49

## 2024-01-24 NOTE — DIETITIAN INITIAL EVALUATION ADULT - COLLABORATION WITH OTHER PROVIDERS
Spoke to pts daughter. Pt is not mobile enough to leave house, and has been seeing a nurse practitioner for her care. Pt was recently hospitalized and is in rehab for PT. Daughter will call pharmacy to have medication request sent to correct provider. IDT

## 2024-01-24 NOTE — DIETITIAN INITIAL EVALUATION ADULT - OTHER INFO
38 year old male with a PMHx of factor V leiden, h/o PE x 2 s/p thrombectomy 12/2020 on Eliquis admitted for perforated appendicitis with large appendicolith, now s/p RA appendectomy, IVC filter placement on 1/23.     Chart reviewed. Pt seen at bedside on 5 EA, on room air. Currently ordered for Consistent carb, clear liquid diet. Pt endorses nausea & abd pain yesterday however improved today and able to take sips of liquids with no issues. Reported allergy to shellfish, updated in EMR. No cultural/ethnic/[place] food preferences provided. Pt reported good appetite & PO intake at baseline, denies recent weight changes. No overt muscle wasting/subcutaneous losses noted per NFPE. Labs reviewed- fingersticks trending 144-185 mg/dL x 24hrs. Na 137, monitor fluid/volume status, bicarb 21 <L>, BUN 5 <L>, Creat WNL, Phos 2.2 <L> [needs repletion], Hgb A1c 9.5% [1/22]. Meds- on zofran, protonix. RDN will continue to monitor, reassess, and intervene as appropriate.     Pain: no pain/discomfort noted  Skin: appendectomy sx incisions x4. Raphael score 20  GI: denies n/v/c/d/abd pain

## 2024-01-24 NOTE — PROGRESS NOTE ADULT - SUBJECTIVE AND OBJECTIVE BOX
This was originally a consult for Dr. Elizabeth Núñez, but I was asked to take over this consult. Mr. Percy Garces is a 38M w/ Factor V Leiden deficiency  w/ multiple episodes of DVT/PE (most recently 2020 s/p thrombectomy, denied having COVID at the time),  now on Eliquis, admitted for acute appendicitis w/ perforation. BMI 41.4. Vascular surgery was consulted for IVC filter placement prior to general surgery intervention.    He underwent IVC filter placement via US guided R femoral access, followed by robotic-assisted appendectomy (1/23/24). He was transferred back to ICU. He feels better. No leg or feet complaints. Motor and sensation intact. Calves non-tender. Groin access site soft/nontender.

## 2024-01-24 NOTE — DIETITIAN INITIAL EVALUATION ADULT - ADD RECOMMEND
1. Advance diet as soon as medically feasible  - goal: Consistent carb diet, safe texture per team/SLP  - monitor intake & tolerance  2. Consider oral nutrition supplement if PO intake suboptimal to meet needs  3. Monitor chemistry, GI function, skin integrity  4. Pain & bowel regimen per team

## 2024-01-24 NOTE — DIETITIAN INITIAL EVALUATION ADULT - OTHER CALCULATIONS
Ideal body weight (80.7kg) used for calculations as pt >100% IBW per [facility] Standards of Care. Needs estimated for age and adjusted for current clinical status, increased needs for post-op healing. Fluid needs per team

## 2024-01-24 NOTE — PROGRESS NOTE ADULT - SUBJECTIVE AND OBJECTIVE BOX
Interval Events:  Patient seen and examined at bedside. Patient reports no chills, pain or nausea. Patient denies having a bowel movement but admits to passing flatus.      Allergies    No Known Allergies    Intolerances        Vital Signs Last 24 Hrs  T(C): 36.8 (24 Jan 2024 09:00), Max: 40 (23 Jan 2024 17:00)  T(F): 98.3 (24 Jan 2024 09:00), Max: 104 (23 Jan 2024 17:00)  HR: 100 (24 Jan 2024 10:00) (85 - 116)  BP: 102/65 (24 Jan 2024 10:00) (91/50 - 135/63)  BP(mean): 80 (24 Jan 2024 10:00) (65 - 90)  RR: 22 (24 Jan 2024 10:00) (16 - 41)  SpO2: 92% (24 Jan 2024 10:00) (91% - 97%)    Parameters below as of 24 Jan 2024 10:00  Patient On (Oxygen Delivery Method): room air        01-23 @ 07:01 - 01-24 @ 07:00  --------------------------------------------------------  IN: 4874.8 mL / OUT: 2570 mL / NET: 2304.8 mL    01-24 @ 07:01 - 01-24 @ 12:09  --------------------------------------------------------  IN: 125 mL / OUT: 60 mL / NET: 65 mL      01-23 @ 07:01 - 01-24 @ 07:00  --------------------------------------------------------  IN: 4874.8 mL / OUT: 2570 mL / NET: 2304.8 mL    01-24 @ 07:01 - 01-24 @ 12:09  --------------------------------------------------------  IN: 125 mL / OUT: 60 mL / NET: 65 mL        Physical Exam:     Gen: NAD well nourished  Neuro: A&OX3 No deficits  CV:RRR Reg s1s2 noM  Pulm: CTA b/l No w/r/r  Abd: Soft Mild tenderness to palpation in RLQ and LLQ ND + BS  Ext: No C/C/E   Vasc: + DP b/l   Skin: no rashes noted  MSK: No joint swelling  Psych: No signs of anxiety or depression      LABS:      CBC Full  -  ( 24 Jan 2024 05:30 )  WBC Count : 18.73 K/uL  RBC Count : 4.71 M/uL  Hemoglobin : 12.5 g/dL  Hematocrit : 40.5 %  Platelet Count - Automated : 273 K/uL  Mean Cell Volume : 86.0 fl  Mean Cell Hemoglobin : 26.5 pg  Mean Cell Hemoglobin Concentration : 30.9 gm/dL  Auto Neutrophil # : x  Auto Lymphocyte # : x  Auto Monocyte # : x  Auto Eosinophil # : x  Auto Basophil # : x  Auto Neutrophil % : x  Auto Lymphocyte % : x  Auto Monocyte % : x  Auto Eosinophil % : x  Auto Basophil % : x    01-24    137  |  106  |  5<L>  ----------------------------<  163<H>  4.0   |  21<L>  |  0.56    Ca    7.9<L>      24 Jan 2024 05:30  Phos  2.2     01-24  Mg     2.1     01-24    TPro  6.6  /  Alb  3.3  /  TBili  0.9  /  DBili  x   /  AST  20  /  ALT  46<H>  /  AlkPhos  99  01-23    PT/INR - ( 24 Jan 2024 05:30 )   PT: 14.0 sec;   INR: 1.24          PTT - ( 24 Jan 2024 05:30 )  PTT:20.5 sec      Urinalysis Basic - ( 24 Jan 2024 05:30 )    Color: x / Appearance: x / SG: x / pH: x  Gluc: 163 mg/dL / Ketone: x  / Bili: x / Urobili: x   Blood: x / Protein: x / Nitrite: x   Leuk Esterase: x / RBC: x / WBC x   Sq Epi: x / Non Sq Epi: x / Bacteria: x              RADIOLOGY & ADDITIONAL STUDIES (The following images were personally reviewed):          A/p: 38yMale Interval Events: TTE done, Fever has resolved and MAP has remained >65    Subjective:   Patient seen and examined at bedside. Patient reports no chills, pain or nausea. Patient denies having a bowel movement but admits to passing flatus. Had a loose BM.     Allergies  No Known Allergies  Intolerances    Vital Signs Last 24 Hrs  T(C): 36.8 (24 Jan 2024 09:00), Max: 40 (23 Jan 2024 17:00)  T(F): 98.3 (24 Jan 2024 09:00), Max: 104 (23 Jan 2024 17:00)  HR: 100 (24 Jan 2024 10:00) (85 - 116)  BP: 102/65 (24 Jan 2024 10:00) (91/50 - 135/63)  BP(mean): 80 (24 Jan 2024 10:00) (65 - 90)  RR: 22 (24 Jan 2024 10:00) (16 - 41)  SpO2: 92% (24 Jan 2024 10:00) (91% - 97%)    Parameters below as of 24 Jan 2024 10:00  Patient On (Oxygen Delivery Method): room air    01-23 @ 07:01 - 01-24 @ 07:00  --------------------------------------------------------  IN: 4874.8 mL / OUT: 2570 mL / NET: 2304.8 mL    01-24 @ 07:01 - 01-24 @ 12:09  --------------------------------------------------------  IN: 125 mL / OUT: 60 mL / NET: 65 mL    01-23 @ 07:01 - 01-24 @ 07:00  --------------------------------------------------------  IN: 4874.8 mL / OUT: 2570 mL / NET: 2304.8 mL    01-24 @ 07:01  -  01-24 @ 12:09  --------------------------------------------------------  IN: 125 mL / OUT: 60 mL / NET: 65 mL    Physical Exam:     Gen: NAD well nourished  Neuro: A&OX3 No deficits  CV:RRR Reg s1s2 noM  Pulm: CTA b/l No w/r/r  Abd: Soft Mild tenderness to palpation in RLQ and LLQ ND + BS  Ext: No C/C/E   Vasc: + DP b/l   Skin: no rashes noted  MSK: No joint swelling  Psych: No signs of anxiety or depression    LABS:    CBC Full  -  ( 24 Jan 2024 05:30 )  WBC Count : 18.73 K/uL  RBC Count : 4.71 M/uL  Hemoglobin : 12.5 g/dL  Hematocrit : 40.5 %  Platelet Count - Automated : 273 K/uL  Mean Cell Volume : 86.0 fl  Mean Cell Hemoglobin : 26.5 pg  Mean Cell Hemoglobin Concentration : 30.9 gm/dL  Auto Neutrophil # : x  Auto Lymphocyte # : x  Auto Monocyte # : x  Auto Eosinophil # : x  Auto Basophil # : x  Auto Neutrophil % : x  Auto Lymphocyte % : x  Auto Monocyte % : x  Auto Eosinophil % : x  Auto Basophil % : x    01-24    137  |  106  |  5<L>  ----------------------------<  163<H>  4.0   |  21<L>  |  0.56    Ca    7.9<L>      24 Jan 2024 05:30  Phos  2.2     01-24  Mg     2.1     01-24    TPro  6.6  /  Alb  3.3  /  TBili  0.9  /  DBili  x   /  AST  20  /  ALT  46<H>  /  AlkPhos  99  01-23    PT/INR - ( 24 Jan 2024 05:30 )   PT: 14.0 sec;   INR: 1.24        PTT - ( 24 Jan 2024 05:30 )  PTT:20.5 sec    Urinalysis Basic - ( 24 Jan 2024 05:30 )    Color: x / Appearance: x / SG: x / pH: x  Gluc: 163 mg/dL / Ketone: x  / Bili: x / Urobili: x   Blood: x / Protein: x / Nitrite: x   Leuk Esterase: x / RBC: x / WBC x   Sq Epi: x / Non Sq Epi: x / Bacteria: x

## 2024-01-24 NOTE — PHYSICAL THERAPY INITIAL EVALUATION ADULT - ADDITIONAL COMMENTS
Patient lives with spouse in elevator accessible apartment. Does not use any Assistive Devices at baseline. Denies recent Hx of falls.

## 2024-01-24 NOTE — PHYSICAL THERAPY INITIAL EVALUATION ADULT - PERTINENT HX OF CURRENT PROBLEM, REHAB EVAL
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 Admitted for perforated appendicitis with appendicolith, sp robotic assisted appendectomy and IVC filter placement on 1/23, course also significant for new diagnosis DM and fever 1/23.

## 2024-01-24 NOTE — PROGRESS NOTE ADULT - ATTENDING COMMENTS
Patient seen and examined. Continues to have some pain, markedly elevated WBC. Tenderness on examination consistent with peritonitis. Will need source control. Now on Heparin GTT. We discussed operative intervention. He will have IVC filter placement with vascular surgery prior to surgery given his extreme risk of life threatening PE (hx x2) and need for holding anticoagulation. We discussed the risks, benefits and alternatives to robotic, laparoscopic, open appendectomy, ileocectomy, and potential right colon resection including but not limited to need for stoma, death, disability, bleeding, blood clots, chronic pain, numbness, scarring, abscess, leak, need for additional procedures, hernia, and other issues. The patient does wish to proceed with surgery. I answered all questions.
Also see resident note
Factor V Leiden with h/o PEs requiring thrombectomy admitted with sepsis from ruptured appendicitis now s/p washout  physical as above  continue Zosyn; he did spike to 104 postop; if becomes unstable will dose tobramycin  apparent uncontrolled DM he is not aware of  SSI for now  s/p IVC filter  SQ heparin this evening with SCDs  empiric 2 liters of crystalloid with high fever
Factor V Leiden, DM, obesity, s/p robotic appendectomy with washout, IVC filter  physical as above  continue zosyn; no further fevers  appears volume-replete now, given extra fluid last evening for insensible loss with high fevers  clear liquid diet  SCDs  for now sugars are controlled with low insulin needs, follow as diet is advanced  BC so far negative  PASP slightly high, may be related to his obesity

## 2024-01-24 NOTE — PROGRESS NOTE ADULT - SUBJECTIVE AND OBJECTIVE BOX
STATUS POST: RA Appendectomy, IVC filter placement     POST OPERATIVE DAY #: 1    SUBJECTIVE: Pt seen and examined at bedside this am by surgery team. Patient is lying comfortably in bed with no complaints. Tolerating diet, pain well controlled with current regimen. Patient denies fever, nausea, vomiting, chest pain, and shortness of breath. Patient is passing gas and having bowel movements.     MEDICATIONS  (STANDING):  acetaminophen     Tablet .. 1000 milliGRAM(s) Oral every 6 hours  chlorhexidine 2% Cloths 1 Application(s) Topical daily  dextrose 5%. 1000 milliLiter(s) (50 mL/Hr) IV Continuous <Continuous>  dextrose 5%. 1000 milliLiter(s) (100 mL/Hr) IV Continuous <Continuous>  dextrose 50% Injectable 12.5 Gram(s) IV Push once  dextrose 50% Injectable 25 Gram(s) IV Push once  dextrose 50% Injectable 25 Gram(s) IV Push once  emtricitabine 200 mG/tenofovir 300 mG (TRUVADA) 1 Tablet(s) Oral daily  glucagon  Injectable 1 milliGRAM(s) IntraMuscular once  heparin   Injectable 7500 Unit(s) SubCutaneous every 8 hours  influenza   Vaccine 0.5 milliLiter(s) IntraMuscular once  insulin lispro (ADMELOG) corrective regimen sliding scale   SubCutaneous Before meals and at bedtime  ketorolac   Injectable 15 milliGRAM(s) IV Push every 6 hours  pantoprazole  Injectable 40 milliGRAM(s) IV Push daily  piperacillin/tazobactam IVPB.. 3.375 Gram(s) IV Intermittent every 8 hours  potassium phosphate / sodium phosphate Powder (PHOS-NaK) 1 Packet(s) Oral once    MEDICATIONS  (PRN):  benzocaine/menthol Lozenge 1 Lozenge Oral every 2 hours PRN Sore Throat  dextrose Oral Gel 15 Gram(s) Oral once PRN Blood Glucose LESS THAN 70 milliGRAM(s)/deciliter  HYDROmorphone  Injectable 0.5 milliGRAM(s) IV Push every 4 hours PRN Severe Pain (7 - 10)  ondansetron Injectable 4 milliGRAM(s) IV Push every 6 hours PRN Nausea and/or Vomiting      Vital Signs Last 24 Hrs  T(C): 37.2 (24 Jan 2024 05:03), Max: 40 (23 Jan 2024 17:00)  T(F): 99 (24 Jan 2024 05:03), Max: 104 (23 Jan 2024 17:00)  HR: 92 (24 Jan 2024 08:00) (85 - 116)  BP: 108/53 (24 Jan 2024 08:00) (91/50 - 135/63)  BP(mean): 73 (24 Jan 2024 08:00) (65 - 90)  RR: 19 (24 Jan 2024 08:00) (16 - 41)  SpO2: 94% (24 Jan 2024 08:00) (91% - 97%)    Parameters below as of 24 Jan 2024 08:00  Patient On (Oxygen Delivery Method): nasal cannula  O2 Flow (L/min): 2      Physical Exam  General: Patient is doing well and lying in bed comfortably  Constitutional: alert and oriented   Pulm: Nonlabored breathing, no respiratory distress  CV: Regular rate and rhythm, normal sinus rhythm  Abd:  soft, mildly tender to palpation, primarily RLQ, nondistended. No rebound, no guarding.             Incisions: clean, dry, intact and healing well, no erythema/induration/edema  : isaac in place with clear yellow urine  Extremities: warm, well perfused, no edema    I&O's Detail    23 Jan 2024 07:01  -  24 Jan 2024 07:00  --------------------------------------------------------  IN:    IV PiggyBack: 499.8 mL    IV PiggyBack: 325 mL    Lactated Ringers: 1900 mL    Lactated Ringers Bolus: 1000 mL    sodium chloride 0.9%: 150 mL    Sodium Chloride 0.9% Bolus: 1000 mL  Total IN: 4874.8 mL    OUT:    Indwelling Catheter - Urethral (mL): 2570 mL  Total OUT: 2570 mL    Total NET: 2304.8 mL      24 Jan 2024 07:01  -  24 Jan 2024 08:50  --------------------------------------------------------  IN:    IV PiggyBack: 25 mL    Lactated Ringers: 100 mL  Total IN: 125 mL    OUT:    Indwelling Catheter - Urethral (mL): 60 mL  Total OUT: 60 mL    Total NET: 65 mL        LABS:                        12.5   18.73 )-----------( 273      ( 24 Jan 2024 05:30 )             40.5     01-24    137  |  106  |  5<L>  ----------------------------<  163<H>  4.0   |  21<L>  |  0.56    Ca    7.9<L>      24 Jan 2024 05:30  Phos  2.2     01-24  Mg     2.1     01-24    TPro  6.6  /  Alb  3.3  /  TBili  0.9  /  DBili  x   /  AST  20  /  ALT  46<H>  /  AlkPhos  99  01-23    PT/INR - ( 24 Jan 2024 05:30 )   PT: 14.0 sec;   INR: 1.24          PTT - ( 24 Jan 2024 05:30 )  PTT:20.5 sec  Urinalysis Basic - ( 24 Jan 2024 05:30 )    Color: x / Appearance: x / SG: x / pH: x  Gluc: 163 mg/dL / Ketone: x  / Bili: x / Urobili: x   Blood: x / Protein: x / Nitrite: x   Leuk Esterase: x / RBC: x / WBC x   Sq Epi: x / Non Sq Epi: x / Bacteria: x

## 2024-01-24 NOTE — DIETITIAN INITIAL EVALUATION ADULT - PERTINENT LABORATORY DATA
01-24    137  |  106  |  5<L>  ----------------------------<  163<H>  4.0   |  21<L>  |  0.56    Ca    7.9<L>      24 Jan 2024 05:30  Phos  2.2     01-24  Mg     2.1     01-24    TPro  6.6  /  Alb  3.3  /  TBili  0.9  /  DBili  x   /  AST  20  /  ALT  46<H>  /  AlkPhos  99  01-23  POCT Blood Glucose.: 150 mg/dL (01-24-24 @ 11:29)  A1C with Estimated Average Glucose Result: 9.5 % (01-22-24 @ 15:31)

## 2024-01-24 NOTE — PHYSICAL THERAPY INITIAL EVALUATION ADULT - GAIT DEVIATIONS NOTED, PT EVAL
decreased jordon/increased time in double stance/decreased velocity of limb motion/decreased step length/decreased stride length/decreased swing-to-stance ratio

## 2024-01-24 NOTE — PROGRESS NOTE ADULT - ASSESSMENT
PLAN & RECOMMENDATIONS  - Restart therapeutic AC once safe to do so  - While off therapeutic AC, do DVT ppx with SCDs and possible SQH or ppx lovenox when safe  - Please reconsult vascular PRN. Can eventually remove IVC filter as an outpatient in a few weeks once recovers from general surgery procedure and consistently back on AC. Outpatient vascular follow up clinic appointment arranged for 2/15/24.    Thank you,    Gen Serrano MD  Attending Vascular Surgeon  [facility] at [address] [place] [address]  Office: [phone]; Fax: [phone]  [email or web address].

## 2024-01-24 NOTE — PROGRESS NOTE ADULT - ASSESSMENT
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     Continue with CLD  Pain/nausea control prn  Zosyn  dc isaac  IVC filter in place, SCDs  rest of care per SICU

## 2024-01-24 NOTE — PROGRESS NOTE ADULT - ASSESSMENT
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 Reports constant sharp progressively worsening lower abd pain w/ loose watery nb stools and nbnb emesis. Able to tolerate xarelto and occasional bland food/soup but not on day of presentation. No prior abd surgeries. Febrile to 102.6, tachycardic, normotensive in ED. CT concerning for perforated appendicitis containing appendicoliths. Transfered to SICU for hemodynamic monitoring in setting of sepsis. Planned for robotic assisted appendectomy and IVC filter placement on 1/23.    Neuro: Pain control Tylenol, dilaudid prn, nausea control zofran prn  CV: MAP >65. Hx Factor V Leiden. Holding home eliquis. Vascular placed IVC filter prior to OR. TTE on 1/23 showed normal Biventricular function and pulmonary hypertension  Pulm: Satting well on room air. Hx multiple PEs (s/p thrombectomy 2020)  GI: Clear diet. Perforated appendectomy. PPI. IVF D/Max  : Mccullough D/Max, trial to void by 4pm, continue to monitor urine output  ID: Perforated appendicitis: Zosyn (1/22-). Takes prep for prophylaxis: Truvada. Tylenol given for fever on 1/23, patient is currently afebrile. 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 Admitted for perforated appendicitis with appendicolith, sp robotic assisted appendectomy and IVC filter placement on 1/23, course also significant for new diagnosis DM and fever 1/23.    Neuro: Pain control Tylenol, Dilaudid weaned to PO Oxycodone PRN. Nausea: Zofran PRN.   CV: MAP >65, Euvolemic, good CO, well perfused. Hx Factor V Leiden. Holding home eliquis. Vascular placed IVC filter prior to OR. TTE (1/23) normal RV EF, LVEF 60% and PASP 49.   Pulm: Satting well on room air. Hx multiple PEs (s/p thrombectomy 2020).  GI: Clear diet. Perforated appendectomy. PPI. IVF discontinued, will bolus as needed if febrile or low UOP.   : Mccullough D/Max, trial to void by 4pm, continue to monitor urine output  ID: Perforated appendicitis: Zosyn (1/22-). Takes prep for prophylaxis: Truvada. Tylenol given for fever on 1/23, patient is currently afebrile. Blood cultures drawn 1/23 show no growth at 12 hours.  Endo: mISS  PPx: on SQH, holding full AC  Lines: PIVs  PT/OT: ordered, ambulated halls today  DISPO: SICU

## 2024-01-24 NOTE — PHYSICAL THERAPY INITIAL EVALUATION ADULT - GENERAL OBSERVATIONS, REHAB EVAL
Patient received semi-aleman in bed  in NAD on RA, +SCDs, +PIV, +ICU Telemetry. Cleared by [facility] [place]. Agreeable to PT.

## 2024-01-24 NOTE — DIETITIAN INITIAL EVALUATION ADULT - PERTINENT MEDS FT
MEDICATIONS  (STANDING):  acetaminophen     Tablet .. 1000 milliGRAM(s) Oral every 6 hours  chlorhexidine 2% Cloths 1 Application(s) Topical daily  dextrose 5%. 1000 milliLiter(s) (50 mL/Hr) IV Continuous <Continuous>  dextrose 5%. 1000 milliLiter(s) (100 mL/Hr) IV Continuous <Continuous>  dextrose 50% Injectable 25 Gram(s) IV Push once  dextrose 50% Injectable 12.5 Gram(s) IV Push once  dextrose 50% Injectable 25 Gram(s) IV Push once  emtricitabine 200 mG/tenofovir 300 mG (TRUVADA) 1 Tablet(s) Oral daily  glucagon  Injectable 1 milliGRAM(s) IntraMuscular once  heparin   Injectable 7500 Unit(s) SubCutaneous every 8 hours  influenza   Vaccine 0.5 milliLiter(s) IntraMuscular once  insulin lispro (ADMELOG) corrective regimen sliding scale   SubCutaneous Before meals and at bedtime  ketorolac   Injectable 15 milliGRAM(s) IV Push every 6 hours  pantoprazole  Injectable 40 milliGRAM(s) IV Push daily  piperacillin/tazobactam IVPB.. 3.375 Gram(s) IV Intermittent every 8 hours    MEDICATIONS  (PRN):  benzocaine/menthol Lozenge 1 Lozenge Oral every 2 hours PRN Sore Throat  dextrose Oral Gel 15 Gram(s) Oral once PRN Blood Glucose LESS THAN 70 milliGRAM(s)/deciliter  HYDROmorphone  Injectable 0.5 milliGRAM(s) IV Push every 4 hours PRN Severe Pain (7 - 10)  ondansetron Injectable 4 milliGRAM(s) IV Push every 6 hours PRN Nausea and/or Vomiting

## 2024-01-25 DIAGNOSIS — E11.9 TYPE 2 DIABETES MELLITUS WITHOUT COMPLICATIONS: ICD-10-CM

## 2024-01-25 LAB
ANION GAP SERPL CALC-SCNC: 8 MMOL/L — SIGNIFICANT CHANGE UP (ref 5–17)
APTT BLD: 26.6 SEC — SIGNIFICANT CHANGE UP (ref 24.5–35.6)
BUN SERPL-MCNC: 5 MG/DL — LOW (ref 7–23)
CALCIUM SERPL-MCNC: 8.4 MG/DL — SIGNIFICANT CHANGE UP (ref 8.4–10.5)
CHLORIDE SERPL-SCNC: 104 MMOL/L — SIGNIFICANT CHANGE UP (ref 96–108)
CO2 SERPL-SCNC: 27 MMOL/L — SIGNIFICANT CHANGE UP (ref 22–31)
CREAT SERPL-MCNC: 0.6 MG/DL — SIGNIFICANT CHANGE UP (ref 0.5–1.3)
EGFR: 126 ML/MIN/1.73M2 — SIGNIFICANT CHANGE UP
GLUCOSE BLDC GLUCOMTR-MCNC: 124 MG/DL — HIGH (ref 70–99)
GLUCOSE BLDC GLUCOMTR-MCNC: 127 MG/DL — HIGH (ref 70–99)
GLUCOSE BLDC GLUCOMTR-MCNC: 153 MG/DL — HIGH (ref 70–99)
GLUCOSE BLDC GLUCOMTR-MCNC: 168 MG/DL — HIGH (ref 70–99)
GLUCOSE SERPL-MCNC: 119 MG/DL — HIGH (ref 70–99)
HCT VFR BLD CALC: 34.5 % — LOW (ref 39–50)
HGB BLD-MCNC: 11 G/DL — LOW (ref 13–17)
INR BLD: 1.13 — SIGNIFICANT CHANGE UP (ref 0.85–1.18)
MAGNESIUM SERPL-MCNC: 2.1 MG/DL — SIGNIFICANT CHANGE UP (ref 1.6–2.6)
MCHC RBC-ENTMCNC: 27.1 PG — SIGNIFICANT CHANGE UP (ref 27–34)
MCHC RBC-ENTMCNC: 31.9 GM/DL — LOW (ref 32–36)
MCV RBC AUTO: 85 FL — SIGNIFICANT CHANGE UP (ref 80–100)
NRBC # BLD: 0 /100 WBCS — SIGNIFICANT CHANGE UP (ref 0–0)
PHOSPHATE SERPL-MCNC: 1.9 MG/DL — LOW (ref 2.5–4.5)
PLATELET # BLD AUTO: 267 K/UL — SIGNIFICANT CHANGE UP (ref 150–400)
POTASSIUM SERPL-MCNC: 3.5 MMOL/L — SIGNIFICANT CHANGE UP (ref 3.5–5.3)
POTASSIUM SERPL-SCNC: 3.5 MMOL/L — SIGNIFICANT CHANGE UP (ref 3.5–5.3)
PROTHROM AB SERPL-ACNC: 12.8 SEC — SIGNIFICANT CHANGE UP (ref 9.5–13)
RBC # BLD: 4.06 M/UL — LOW (ref 4.2–5.8)
RBC # FLD: 13.1 % — SIGNIFICANT CHANGE UP (ref 10.3–14.5)
SODIUM SERPL-SCNC: 139 MMOL/L — SIGNIFICANT CHANGE UP (ref 135–145)
WBC # BLD: 16.05 K/UL — HIGH (ref 3.8–10.5)
WBC # FLD AUTO: 16.05 K/UL — HIGH (ref 3.8–10.5)

## 2024-01-25 PROCEDURE — 99222 1ST HOSP IP/OBS MODERATE 55: CPT

## 2024-01-25 PROCEDURE — 99223 1ST HOSP IP/OBS HIGH 75: CPT

## 2024-01-25 RX ORDER — POTASSIUM CHLORIDE 20 MEQ
40 PACKET (EA) ORAL ONCE
Refills: 0 | Status: COMPLETED | OUTPATIENT
Start: 2024-01-25 | End: 2024-01-25

## 2024-01-25 RX ORDER — ISOPROPYL ALCOHOL, BENZOCAINE .7; .06 ML/ML; ML/ML
0 SWAB TOPICAL
Qty: 100 | Refills: 1
Start: 2024-01-25

## 2024-01-25 RX ORDER — ENOXAPARIN SODIUM 100 MG/ML
120 INJECTION SUBCUTANEOUS EVERY 12 HOURS
Refills: 0 | Status: DISCONTINUED | OUTPATIENT
Start: 2024-01-25 | End: 2024-01-26

## 2024-01-25 RX ORDER — POTASSIUM PHOSPHATE, MONOBASIC POTASSIUM PHOSPHATE, DIBASIC 236; 224 MG/ML; MG/ML
15 INJECTION, SOLUTION INTRAVENOUS ONCE
Refills: 0 | Status: COMPLETED | OUTPATIENT
Start: 2024-01-25 | End: 2024-01-25

## 2024-01-25 RX ORDER — TIRZEPATIDE 15 MG/.5ML
2.5 INJECTION, SOLUTION SUBCUTANEOUS
Qty: 4 | Refills: 0
Start: 2024-01-25

## 2024-01-25 RX ORDER — ENOXAPARIN SODIUM 100 MG/ML
140 INJECTION SUBCUTANEOUS EVERY 12 HOURS
Refills: 0 | Status: DISCONTINUED | OUTPATIENT
Start: 2024-01-25 | End: 2024-01-25

## 2024-01-25 RX ADMIN — Medication 15 MILLIGRAM(S): at 23:52

## 2024-01-25 RX ADMIN — ENOXAPARIN SODIUM 120 MILLIGRAM(S): 100 INJECTION SUBCUTANEOUS at 18:20

## 2024-01-25 RX ADMIN — Medication 1000 MILLIGRAM(S): at 19:04

## 2024-01-25 RX ADMIN — Medication 15 MILLIGRAM(S): at 06:18

## 2024-01-25 RX ADMIN — Medication 1000 MILLIGRAM(S): at 18:19

## 2024-01-25 RX ADMIN — POTASSIUM PHOSPHATE, MONOBASIC POTASSIUM PHOSPHATE, DIBASIC 62.5 MILLIMOLE(S): 236; 224 INJECTION, SOLUTION INTRAVENOUS at 09:47

## 2024-01-25 RX ADMIN — Medication 1000 MILLIGRAM(S): at 12:01

## 2024-01-25 RX ADMIN — Medication 15 MILLIGRAM(S): at 11:30

## 2024-01-25 RX ADMIN — Medication 15 MILLIGRAM(S): at 18:19

## 2024-01-25 RX ADMIN — HEPARIN SODIUM 7500 UNIT(S): 5000 INJECTION INTRAVENOUS; SUBCUTANEOUS at 06:18

## 2024-01-25 RX ADMIN — Medication 1000 MILLIGRAM(S): at 07:18

## 2024-01-25 RX ADMIN — Medication 15 MILLIGRAM(S): at 00:20

## 2024-01-25 RX ADMIN — Medication 1000 MILLIGRAM(S): at 00:01

## 2024-01-25 RX ADMIN — Medication 15 MILLIGRAM(S): at 06:40

## 2024-01-25 RX ADMIN — Medication 15 MILLIGRAM(S): at 00:01

## 2024-01-25 RX ADMIN — Medication 40 MILLIEQUIVALENT(S): at 08:08

## 2024-01-25 RX ADMIN — PANTOPRAZOLE SODIUM 40 MILLIGRAM(S): 20 TABLET, DELAYED RELEASE ORAL at 11:31

## 2024-01-25 RX ADMIN — Medication 2: at 11:53

## 2024-01-25 RX ADMIN — Medication 1000 MILLIGRAM(S): at 23:52

## 2024-01-25 RX ADMIN — Medication 1000 MILLIGRAM(S): at 11:30

## 2024-01-25 RX ADMIN — PIPERACILLIN AND TAZOBACTAM 25 GRAM(S): 4; .5 INJECTION, POWDER, LYOPHILIZED, FOR SOLUTION INTRAVENOUS at 14:13

## 2024-01-25 RX ADMIN — PIPERACILLIN AND TAZOBACTAM 25 GRAM(S): 4; .5 INJECTION, POWDER, LYOPHILIZED, FOR SOLUTION INTRAVENOUS at 06:19

## 2024-01-25 RX ADMIN — Medication 1000 MILLIGRAM(S): at 01:12

## 2024-01-25 RX ADMIN — Medication 1000 MILLIGRAM(S): at 06:18

## 2024-01-25 RX ADMIN — Medication 2: at 22:34

## 2024-01-25 RX ADMIN — Medication 15 MILLIGRAM(S): at 19:04

## 2024-01-25 RX ADMIN — EMTRICITABINE AND TENOFOVIR DISOPROXIL FUMARATE 1 TABLET(S): 200; 300 TABLET, FILM COATED ORAL at 11:30

## 2024-01-25 RX ADMIN — ENOXAPARIN SODIUM 120 MILLIGRAM(S): 100 INJECTION SUBCUTANEOUS at 10:12

## 2024-01-25 RX ADMIN — PIPERACILLIN AND TAZOBACTAM 25 GRAM(S): 4; .5 INJECTION, POWDER, LYOPHILIZED, FOR SOLUTION INTRAVENOUS at 23:52

## 2024-01-25 RX ADMIN — Medication 15 MILLIGRAM(S): at 12:01

## 2024-01-25 NOTE — CONSULT NOTE ADULT - SUBJECTIVE AND OBJECTIVE BOX
HISTORY OF PRESENT ILLNESS  37yo Male pt with PMH factor V Leiden presenting with multiple PEs (most recently 2020 s/p thrombectomy) now with 4-day history of abdominal pain. Patient states pain started in b/l LQ and now localized to the right. Had nausea and 1 episode of NBNB emesis. 2 watery bowel movements, passing flatus. Fever on presentation, chills at home. Mild SOB which he attributed to pain.    Last colonoscopy/EGD - none  Denies family hx of IBS, Crohn's, UC, or colon cancer.    In the ED, pt febrile to 39.2, tachycardic to 118, normotensive. On exam, abdomen is soft, rotund, mild to moderate ttp in RLQ. Labs significant for WBC 26.45, H/H 14.5/44.5, Na 133, BUN/Cr 5/0.74. CT AP with PO and IV contrast demonstrates appendix is dilated and contains multiple appendicoliths measuring up to 2 cm; there is appendiceal wall thickening and extensive infiltration of the periappendiceal fat; extraluminal gas is seen in the right lower quadrant.   (22 Jan 2024 14:20)    Interval hx:   On 1/22 pt was Transferred to SICU for hemodynamic monitoring in setting of sepsis. He was started on zosyn. Vasc was consulted for IVC filter placement. A/C was held for surgery. 1/23 s/p temp IVC filter and Robot-assisted appendectomy. Spiked a high grade post op fever of 104 on 1/23 @17:00. Temp 102.3 on 1/24 @ 18:00. Blood cultures NGTD. Stepped down to 8LA. Passed TOV and FD AC restarte    CAPILLARY BLOOD GLUCOSE & INSULIN RECEIVED  185 mg/dL (01-22 @ 15:31)  189 mg/dL (01-22 @ 21:38)  185 mg/dL (01-23 @ 03:37)  185 mg/dL (01-23 @ 06:50)  202 mg/dL (01-23 @ 13:02)  155 mg/dL (01-23 @ 15:54)  148 mg/dL (01-23 @ 21:20)  163 mg/dL (01-24 @ 05:30)  144 mg/dL (01-24 @ 06:16)  150 mg/dL (01-24 @ 11:29)  127 mg/dL (01-24 @ 17:06)  183 mg/dL (01-24 @ 21:32)  119 mg/dL (01-25 @ 05:30)  124 mg/dL (01-25 @ 06:23)  153 mg/dL (01-25 @ 11:42)        DIABETES HISTORY  - Age at diagnosis:   - Symptoms at time of diagnosis:   - Current Therapy:  - History of other regimens:   - History of hypoglycemia:   - History of DKA/HHS:   - Complications:   - Home FSG:        > Fasting: *** mg/dL.        > Before meals: *** mg/dL.        > Bedtime: *** mg/dL.  - Diet:          > Breakfast:         > Lunch:        > Dinner:        > Snacks:  - Physical activity:    - Outpatient follow-up:     PAST MEDICAL & SURGICAL HISTORY  As per history of present illness.     FAMILY HISTORY  - Diabetes:  - Thyroid:  - Autoimmune:  - Other:    SOCIAL HISTORY  Non-smoker, doesn't drink, occasional marijuana.    ALLERGIES  shellfish (Unknown)  No Known Drug Allergies    CURRENT MEDICATIONS  acetaminophen     Tablet .. 1000 milliGRAM(s) Oral every 6 hours  benzocaine/menthol Lozenge 1 Lozenge Oral every 2 hours PRN  dextrose 5%. 1000 milliLiter(s) IV Continuous <Continuous>  dextrose 5%. 1000 milliLiter(s) IV Continuous <Continuous>  dextrose 50% Injectable 25 Gram(s) IV Push once  dextrose 50% Injectable 12.5 Gram(s) IV Push once  dextrose 50% Injectable 25 Gram(s) IV Push once  dextrose Oral Gel 15 Gram(s) Oral once PRN  emtricitabine 200 mG/tenofovir 300 mG (TRUVADA) 1 Tablet(s) Oral daily  enoxaparin Injectable 120 milliGRAM(s) SubCutaneous every 12 hours  glucagon  Injectable 1 milliGRAM(s) IntraMuscular once  insulin lispro (ADMELOG) corrective regimen sliding scale   SubCutaneous Before meals and at bedtime  ketorolac   Injectable 15 milliGRAM(s) IV Push every 6 hours  ondansetron Injectable 4 milliGRAM(s) IV Push every 6 hours PRN  oxyCODONE    IR 5 milliGRAM(s) Oral every 6 hours PRN  oxyCODONE    IR 10 milliGRAM(s) Oral every 6 hours PRN  pantoprazole  Injectable 40 milliGRAM(s) IV Push daily  piperacillin/tazobactam IVPB.. 3.375 Gram(s) IV Intermittent every 8 hours    REVIEW OF SYSTEMS  Constitutional:  Negative fever, chills or loss of appetite.  Eyes:  Negative blurry vision or double vision.  Cardiovascular:  Negative for chest pain or palpitations.  Respiratory:  Negative for cough, wheezing, or shortness of breath.   Gastrointestinal:  Negative for nausea, vomiting, diarrhea, constipation, or abdominal pain.  Genitourinary:  Negative frequency, urgency or dysuria.  Neurologic:  No headache, confusion, dizziness, lightheadedness.    PHYSICAL EXAM  Vital Signs Last 24 Hrs  T(C): 36.8 (25 Jan 2024 09:00), Max: 39.1 (24 Jan 2024 18:00)  T(F): 98.3 (25 Jan 2024 09:00), Max: 102.3 (24 Jan 2024 18:00)  HR: 82 (25 Jan 2024 08:30) (82 - 112)  BP: 118/62 (25 Jan 2024 08:30) (100/55 - 122/64)  BP(mean): 82 (25 Jan 2024 08:30) (74 - 88)  RR: 16 (25 Jan 2024 08:30) (16 - 29)  SpO2: 95% (25 Jan 2024 08:30) (92% - 98%)    Parameters below as of 25 Jan 2024 08:30  Patient On (Oxygen Delivery Method): room air    Constitutional: Awake, alert, in no acute distress.   HEENT: Normocephalic, atraumatic, LAKE, no proptosis or lid retraction.   Neck: supple, no acanthosis, no thyromegaly or palpable thyroid nodules.  Respiratory: Lungs clear to ausculation bilaterally.   Cardiovascular: regular rhythm, normal S1 and S2, no audible murmurs.   GI: soft, non-tender, non-distended, bowel sounds present, no masses appreciated.  Extremities: No lower extremity edema, peripheral pulses present.   Skin: no rashes.   Psychiatric: AAO x 3. Normal affect/mood.     LABS  CBC - WBC/HGB/HTC/PLT: 16.05/11.0/34.5/267 (01-25-24)  BMP: Na/K/Cl/Bicarb/BUN/Cr/Gluc: 139/3.5/104/27/5/0.60/119 (01-25-24)  Anion Gap: 8 (01-25-24)  eGFR: 126 (01-25-24)  Calcium: 8.4 (01-25-24)  Phosphorus: 1.9 (01-25-24)  Magnesium: 2.1 (01-25-24)  LFT - Alb/Tprot/Tbili/Dbili/AlkPhos/ALT/AST: 3.3/--/0.9/--/99/46/20 (01-23-24)  PT/aPTT/INR: 12.8/26.6/1.13 (01-25-24)   HISTORY OF PRESENT ILLNESS  39yo Male pt with PMH factor V Leiden presenting with multiple PEs (most recently 2020 s/p thrombectomy) now with 4-day history of abdominal pain. Patient states pain started in b/l LQ and now localized to the right. Had nausea and 1 episode of NBNB emesis. 2 watery bowel movements, passing flatus. Fever on presentation, chills at home. Mild SOB which he attributed to pain. In the ED, pt febrile to 39.2, tachycardic to 118, normotensive. On exam, abdomen is soft, rotund, mild to moderate ttp in RLQ. Labs significant for WBC 26.45, H/H 14.5/44.5, Na 133, BUN/Cr 5/0.74. CT AP with PO and IV contrast demonstrates appendix is dilated and contains multiple appendicoliths measuring up to 2 cm; there is appendiceal wall thickening and extensive infiltration of the periappendiceal fat; extraluminal gas is seen in the right lower quadrant.    Interval hx:   On 1/22 pt was Transferred to SICU for hemodynamic monitoring in setting of sepsis. He was started on zosyn. Vasc was consulted for IVC filter placement. A/C was held for surgery.   1/23 s/p temp IVC filter and Robot-assisted appendectomy. Spiked a high grade post op fever of 104 on 1/23 @17:00. Temp 102.3 on 1/24 @ 18:00. Blood cultures NGTD. Stepped down to 8LA. Passed TOV and FD AC restarted.    Endocrine consulted for new type 2 diabetes diagnosis.   Pt seen and examined at bedside. Has moderate abdominal pain, mostly on ambulation. Denies N/V. + flatus. Tolerating full meals today. Denies polyuria, polydipsia, blurry vision, or neuropathy symptoms.    Patient does not regularly see doctors and has ever been told glucose was elevated. ED Visit in 2020 with A1C 6.6%. He has family history of diabetes. He has gained 60lbs in past 9 years. Diet: Breakfast - BEC or egg wrap, Lunch - skips or grabs something from school cafeteria, Dinner - fast food. Drinks 2 juices with most meals. Denies snacks and fruit. No exercise. 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 Lives with spouse. Denies ETOH, nicotine. Occaionaly marijuana.     CAPILLARY BLOOD GLUCOSE & INSULIN RECEIVED - Has been on MISS this admission.  185 mg/dL (01-22 @ 15:31)  189 mg/dL (01-22 @ 21:38)  185 mg/dL (01-23 @ 03:37)  185 mg/dL (01-23 @ 06:50)  202 mg/dL (01-23 @ 13:02)  155 mg/dL (01-23 @ 15:54)  148 mg/dL (01-23 @ 21:20)  163 mg/dL (01-24 @ 05:30)  144 mg/dL (01-24 @ 06:16)  150 mg/dL (01-24 @ 11:29)  127 mg/dL (01-24 @ 17:06)  183 mg/dL (01-24 @ 21:32)  119 mg/dL (01-25 @ 05:30)  124 mg/dL (01-25 @ 06:23)  153 mg/dL (01-25 @ 11:42)    ALLERGIES  shellfish (Unknown)  No Known Drug Allergies    CURRENT MEDICATIONS  acetaminophen     Tablet .. 1000 milliGRAM(s) Oral every 6 hours  benzocaine/menthol Lozenge 1 Lozenge Oral every 2 hours PRN  dextrose 5%. 1000 milliLiter(s) IV Continuous <Continuous>  dextrose 5%. 1000 milliLiter(s) IV Continuous <Continuous>  dextrose 50% Injectable 25 Gram(s) IV Push once  dextrose 50% Injectable 12.5 Gram(s) IV Push once  dextrose 50% Injectable 25 Gram(s) IV Push once  dextrose Oral Gel 15 Gram(s) Oral once PRN  emtricitabine 200 mG/tenofovir 300 mG (TRUVADA) 1 Tablet(s) Oral daily  enoxaparin Injectable 120 milliGRAM(s) SubCutaneous every 12 hours  glucagon  Injectable 1 milliGRAM(s) IntraMuscular once  insulin lispro (ADMELOG) corrective regimen sliding scale   SubCutaneous Before meals and at bedtime  ketorolac   Injectable 15 milliGRAM(s) IV Push every 6 hours  ondansetron Injectable 4 milliGRAM(s) IV Push every 6 hours PRN  oxyCODONE    IR 5 milliGRAM(s) Oral every 6 hours PRN  oxyCODONE    IR 10 milliGRAM(s) Oral every 6 hours PRN  pantoprazole  Injectable 40 milliGRAM(s) IV Push daily  piperacillin/tazobactam IVPB.. 3.375 Gram(s) IV Intermittent every 8 hours    REVIEW OF SYSTEMS  Constitutional:  Negative fever, chills or loss of appetite.  Eyes:  Negative blurry vision or double vision.  Cardiovascular:  Negative for chest pain or palpitations.  Respiratory:  Negative for cough, wheezing, or shortness of breath.   Gastrointestinal:  Negative for nausea, vomiting, diarrhea, constipation, or abdominal pain.  Genitourinary:  Negative frequency, urgency or dysuria.  Neurologic:  No headache, confusion, dizziness, lightheadedness.    PHYSICAL EXAM  Vital Signs Last 24 Hrs  T(C): 36.8 (25 Jan 2024 09:00), Max: 39.1 (24 Jan 2024 18:00)  T(F): 98.3 (25 Jan 2024 09:00), Max: 102.3 (24 Jan 2024 18:00)  HR: 82 (25 Jan 2024 08:30) (82 - 112)  BP: 118/62 (25 Jan 2024 08:30) (100/55 - 122/64)  BP(mean): 82 (25 Jan 2024 08:30) (74 - 88)  RR: 16 (25 Jan 2024 08:30) (16 - 29)  SpO2: 95% (25 Jan 2024 08:30) (92% - 98%)    Parameters below as of 25 Jan 2024 08:30  Patient On (Oxygen Delivery Method): room air    Constitutional: Awake, alert, in no acute distress. Obese  HEENT: Normocephalic, atraumatic, LAKE, no proptosis or lid retraction.   Neck: supple, no acanthosis, no thyromegaly or palpable thyroid nodules.  Respiratory: Lungs clear to ausculation bilaterally.   Cardiovascular: regular rhythm, normal S1 and S2, no audible murmurs.   GI: soft, non-tender, non-distended, bowel sounds present, no masses appreciated.  Extremities: No lower extremity edema, peripheral pulses present.   Skin: no rashes.   Psychiatric: AAO x 3. Normal affect/mood.     LABS  CBC - WBC/HGB/HTC/PLT: 16.05/11.0/34.5/267 (01-25-24)  BMP: Na/K/Cl/Bicarb/BUN/Cr/Gluc: 139/3.5/104/27/5/0.60/119 (01-25-24)  Anion Gap: 8 (01-25-24)  eGFR: 126 (01-25-24)  Calcium: 8.4 (01-25-24)  Phosphorus: 1.9 (01-25-24)  Magnesium: 2.1 (01-25-24)  LFT - Alb/Tprot/Tbili/Dbili/AlkPhos/ALT/AST: 3.3/--/0.9/--/99/46/20 (01-23-24)  PT/aPTT/INR: 12.8/26.6/1.13 (01-25-24)   HISTORY OF PRESENT ILLNESS  37yo Male pt with PMH factor V Leiden presenting with multiple PEs (most recently 2020 s/p thrombectomy) now with 4-day history of abdominal pain. Patient states pain started in b/l LQ and now localized to the right. Had nausea and 1 episode of NBNB emesis. 2 watery bowel movements, passing flatus. Fever on presentation, chills at home. Mild SOB which he attributed to pain. In the ED, pt febrile to 39.2, tachycardic to 118, normotensive. On exam, abdomen is soft, rotund, mild to moderate ttp in RLQ. Labs significant for WBC 26.45, H/H 14.5/44.5, Na 133, BUN/Cr 5/0.74. CT AP with PO and IV contrast demonstrates appendix is dilated and contains multiple appendicoliths measuring up to 2 cm; there is appendiceal wall thickening and extensive infiltration of the periappendiceal fat; extraluminal gas is seen in the right lower quadrant.    Interval hx:   On 1/22 pt was Transferred to SICU for hemodynamic monitoring in setting of sepsis. He was started on zosyn. Vasc was consulted for IVC filter placement. A/C was held for surgery.   1/23 s/p temp IVC filter and Robot-assisted appendectomy. Spiked a high grade post op fever of 104 on 1/23 @17:00. Temp 102.3 on 1/24 @ 18:00. Blood cultures NGTD. Stepped down to 8LA. Passed TOV and FD AC restarted.    Endocrine consulted for new type 2 diabetes diagnosis.   Pt seen and examined at bedside. Has moderate abdominal pain, mostly on ambulation. Denies N/V. + flatus. Tolerating full meals today. Denies polyuria, polydipsia, blurry vision, or neuropathy symptoms.    Patient does not regularly see doctors and has never been told glucose was elevated. ED Visit in 2020 with A1C 6.6%. He has family history of diabetes. He has gained 60lbs in past 9 years. Diet: Breakfast - BEC or egg wrap, Lunch - skips or grabs something from school cafeteria, Dinner - fast food. Drinks 2 juices with most meals. Denies snacks and fruit. No exercise. Works as  [employer] and very busy/stressful. Lives with spouse. Denies ETOH, nicotine. Occasional marijuana.     CAPILLARY BLOOD GLUCOSE & INSULIN RECEIVED - Has been on MISS this admission.  185 mg/dL (01-22 @ 15:31)  189 mg/dL (01-22 @ 21:38)  185 mg/dL (01-23 @ 03:37)  185 mg/dL (01-23 @ 06:50)  202 mg/dL (01-23 @ 13:02)  155 mg/dL (01-23 @ 15:54)  148 mg/dL (01-23 @ 21:20)  163 mg/dL (01-24 @ 05:30)  144 mg/dL (01-24 @ 06:16)  150 mg/dL (01-24 @ 11:29)  127 mg/dL (01-24 @ 17:06)  183 mg/dL (01-24 @ 21:32)  119 mg/dL (01-25 @ 05:30)  124 mg/dL (01-25 @ 06:23)  153 mg/dL (01-25 @ 11:42)    ALLERGIES  shellfish (Unknown)  No Known Drug Allergies    CURRENT MEDICATIONS  acetaminophen     Tablet .. 1000 milliGRAM(s) Oral every 6 hours  benzocaine/menthol Lozenge 1 Lozenge Oral every 2 hours PRN  dextrose 5%. 1000 milliLiter(s) IV Continuous <Continuous>  dextrose 5%. 1000 milliLiter(s) IV Continuous <Continuous>  dextrose 50% Injectable 25 Gram(s) IV Push once  dextrose 50% Injectable 12.5 Gram(s) IV Push once  dextrose 50% Injectable 25 Gram(s) IV Push once  dextrose Oral Gel 15 Gram(s) Oral once PRN  emtricitabine 200 mG/tenofovir 300 mG (TRUVADA) 1 Tablet(s) Oral daily  enoxaparin Injectable 120 milliGRAM(s) SubCutaneous every 12 hours  glucagon  Injectable 1 milliGRAM(s) IntraMuscular once  insulin lispro (ADMELOG) corrective regimen sliding scale   SubCutaneous Before meals and at bedtime  ketorolac   Injectable 15 milliGRAM(s) IV Push every 6 hours  ondansetron Injectable 4 milliGRAM(s) IV Push every 6 hours PRN  oxyCODONE    IR 5 milliGRAM(s) Oral every 6 hours PRN  oxyCODONE    IR 10 milliGRAM(s) Oral every 6 hours PRN  pantoprazole  Injectable 40 milliGRAM(s) IV Push daily  piperacillin/tazobactam IVPB.. 3.375 Gram(s) IV Intermittent every 8 hours    REVIEW OF SYSTEMS  Constitutional:  Negative fever, chills or loss of appetite.  Eyes:  Negative blurry vision or double vision.  Cardiovascular:  Negative for chest pain or palpitations.  Respiratory:  Negative for cough, wheezing, or shortness of breath.   Gastrointestinal:  Negative for nausea, vomiting, diarrhea, constipation, or abdominal pain.  Genitourinary:  Negative frequency, urgency or dysuria.  Neurologic:  No headache, confusion, dizziness, lightheadedness.    PHYSICAL EXAM  Vital Signs Last 24 Hrs  T(C): 36.8 (25 Jan 2024 09:00), Max: 39.1 (24 Jan 2024 18:00)  T(F): 98.3 (25 Jan 2024 09:00), Max: 102.3 (24 Jan 2024 18:00)  HR: 82 (25 Jan 2024 08:30) (82 - 112)  BP: 118/62 (25 Jan 2024 08:30) (100/55 - 122/64)  BP(mean): 82 (25 Jan 2024 08:30) (74 - 88)  RR: 16 (25 Jan 2024 08:30) (16 - 29)  SpO2: 95% (25 Jan 2024 08:30) (92% - 98%)    Parameters below as of 25 Jan 2024 08:30  Patient On (Oxygen Delivery Method): room air    Constitutional: Awake, alert, in no acute distress. Obese  HEENT: Normocephalic, atraumatic, LAKE, no proptosis or lid retraction.   Neck: supple, no acanthosis, no thyromegaly or palpable thyroid nodules.  Respiratory: Lungs clear to ausculation bilaterally.   Cardiovascular: regular rhythm, normal S1 and S2, no audible murmurs.   GI: soft, non-tender, non-distended, bowel sounds present, no masses appreciated.  Extremities: No lower extremity edema, peripheral pulses present.   Skin: no rashes.   Psychiatric: AAO x 3. Normal affect/mood.     LABS  CBC - WBC/HGB/HTC/PLT: 16.05/11.0/34.5/267 (01-25-24)  BMP: Na/K/Cl/Bicarb/BUN/Cr/Gluc: 139/3.5/104/27/5/0.60/119 (01-25-24)  Anion Gap: 8 (01-25-24)  eGFR: 126 (01-25-24)  Calcium: 8.4 (01-25-24)  Phosphorus: 1.9 (01-25-24)  Magnesium: 2.1 (01-25-24)  LFT - Alb/Tprot/Tbili/Dbili/AlkPhos/ALT/AST: 3.3/--/0.9/--/99/46/20 (01-23-24)  PT/aPTT/INR: 12.8/26.6/1.13 (01-25-24)

## 2024-01-25 NOTE — CONSULT NOTE ADULT - ASSESSMENT
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 Admitted for perforated appendicitis with appendicolith, sp robotic assisted appendectomy and IVC filter placement on 1/23    #Sepsis   - Perforated appendicitis with appendicolith, sp robotic assisted appendectomy and IVC filter placement on 1/23  - Spiked a high grade post op fever of 104 on 1/23 @17:00. Temp 102.3 on 1/24 @ 18:00. Blood cultures NGTD.   - Follow blood cx  - On Zosyn, add vanc if continues to spike high grade fevers    # perforated appendicitis  - Continue Zosyn, add vanc if continues to spike high grade fevers  - s/p appendectomy 1/23  - Management as per primary team     #DM w/ Hyperglycemia   - A1C: 9.5, recommend Endo consult. Patient would like to try oral medication along with diet and exercise  - BG well controlled on ISS, received 4U from ISS over 24hrs   - monitor fs    #factor V leiden   - On eliquis at home.   - Temp IVC filter placed 1/23. Follow up outpatient for removal   - Started on FD lovenox today     #HIV ppx  - continue Truvada     #Acute blood loss anemia   -Drop in h/h from 14.5 on admission to 11  -Continue to monitor H/H daily   - No signs of active bleeding   - Active type and screen    DVT ppx: on lovenox   Dispo: as per primary team

## 2024-01-25 NOTE — CONSULT NOTE ADULT - SUBJECTIVE AND OBJECTIVE BOX
HPI:  39yo Male pt with PMH factor V Leiden presenting with multiple PEs (most recently 2020 s/p thrombectomy) now with 4-day history of abdominal pain. Patient states pain started in b/l LQ and now localized to the right. Had nausea and 1 episode of NBNB emesis. 2 watery bowel movements, passing flatus. Fever on presentation, chills at home. Mild SOB which he attributed to pain.    Last colonoscopy/EGD - none  Denies family hx of IBS, Crohn's, UC, or colon cancer.    In the ED, pt febrile to 39.2, tachycardic to 118, normotensive. On exam, abdomen is soft, rotund, mild to moderate ttp in RLQ. Labs significant for WBC 26.45, H/H 14.5/44.5, Na 133, BUN/Cr 5/0.74. CT AP with PO and IV contrast demonstrates appendix is dilated and contains multiple appendicoliths measuring up to 2 cm; there is appendiceal wall thickening and extensive infiltration of the periappendiceal fat; extraluminal gas is seen in the right lower quadrant.   (22 Jan 2024 14:20)    Interval hx:   On 1/22 pt was Transferred to SICU for hemodynamic monitoring in setting of sepsis. He was started on zosyn. Vasc was consulted for IVC filter placement. A/C was held for surgery. 1/23 s/p temp IVC filter and Robot-assisted appendectomy. Spiked a high grade post op fever of 104 on 1/23 @17:00. Temp 102.3 on 1/24 @ 18:00. Blood cultures NGTD. Stepped down to 8LA. Passed TOV and FD AC restarted.     PAST MEDICAL & SURGICAL HISTORY:  Pulmonary embolism      Factor V Leiden      No significant past surgical history          Home Medications:      Allergies    shellfish (Unknown)  No Known Drug Allergies    Intolerances        FAMILY HISTORY:      Social History:  Non-smoker, doesn't drink, occasional marijuana. (22 Jan 2024 14:20)      REVIEW OF SYSTEMS:  CONSTITUTIONAL: No fever, weight loss  EYES: No eye pain, visual disturbances, or discharge  ENMT:  No difficulty hearing, tinnitus, vertigo; No throat pain  NECK: No pain or stiffness  RESPIRATORY: No cough, wheezing, or hemoptysis; No dyspnea  CARDIOVASCULAR: No chest pain, palpitations, dizziness, or leg swelling  GASTROINTESTINAL: No abdominal pain. No nausea, vomiting, or hematemesis; No diarrhea or constipation. No melena or hematochezia.  GENITOURINARY: No dysuria, frequency, or hematuria  NEUROLOGICAL: No headaches, memory loss, numbness, or tremors  SKIN: No itching, burning, rashes, or lesions     CURRENT MEDICATIONS:   acetaminophen     Tablet .. 1000 milliGRAM(s) Oral every 6 hours  benzocaine/menthol Lozenge 1 Lozenge Oral every 2 hours PRN  dextrose 5%. 1000 milliLiter(s) IV Continuous <Continuous>  dextrose 5%. 1000 milliLiter(s) IV Continuous <Continuous>  dextrose 50% Injectable 25 Gram(s) IV Push once  dextrose 50% Injectable 12.5 Gram(s) IV Push once  dextrose 50% Injectable 25 Gram(s) IV Push once  dextrose Oral Gel 15 Gram(s) Oral once PRN  emtricitabine 200 mG/tenofovir 300 mG (TRUVADA) 1 Tablet(s) Oral daily  enoxaparin Injectable 120 milliGRAM(s) SubCutaneous every 12 hours  glucagon  Injectable 1 milliGRAM(s) IntraMuscular once  insulin lispro (ADMELOG) corrective regimen sliding scale   SubCutaneous Before meals and at bedtime  ketorolac   Injectable 15 milliGRAM(s) IV Push every 6 hours  ondansetron Injectable 4 milliGRAM(s) IV Push every 6 hours PRN  oxyCODONE    IR 5 milliGRAM(s) Oral every 6 hours PRN  oxyCODONE    IR 10 milliGRAM(s) Oral every 6 hours PRN  pantoprazole  Injectable 40 milliGRAM(s) IV Push daily  piperacillin/tazobactam IVPB.. 3.375 Gram(s) IV Intermittent every 8 hours      VITAL SIGNS, INS/OUTS (last 24 hours):  Vital Signs Last 24 Hrs  T(C): 36.8 (25 Jan 2024 09:00), Max: 39.1 (24 Jan 2024 18:00)  T(F): 98.3 (25 Jan 2024 09:00), Max: 102.3 (24 Jan 2024 18:00)  HR: 82 (25 Jan 2024 08:30) (82 - 112)  BP: 118/62 (25 Jan 2024 08:30) (100/55 - 122/64)  BP(mean): 82 (25 Jan 2024 08:30) (74 - 88)  RR: 16 (25 Jan 2024 08:30) (16 - 29)  SpO2: 95% (25 Jan 2024 08:30) (92% - 98%)    Parameters below as of 25 Jan 2024 08:30  Patient On (Oxygen Delivery Method): room air      I&O's Summary    24 Jan 2024 07:01  -  25 Jan 2024 07:00  --------------------------------------------------------  IN: 2900 mL / OUT: 2710 mL / NET: 190 mL    25 Jan 2024 07:01  -  25 Jan 2024 12:10  --------------------------------------------------------  IN: 240 mL / OUT: 0 mL / NET: 240 mL        PHYSICAL EXAM:  Gen: NAD  ENT: NCAT, MMM, clear OP  Neck: supple, trachea at midline  CV: RRR, +S1/S2  Pulm: adequate respiratory effort, no increase in work of breathing  Abd: soft, NTND  Skin: warm and dry, no new rashes vs prior report  Ext: WWP, no LE edema  Neuro: AOx3, no gross focal neurological deficits  Psych: affect and behavior appropriate, pleasant at time of interview    BASIC LABS:                        11.0   16.05 )-----------( 267      ( 25 Jan 2024 05:30 )             34.5     01-25    139  |  104  |  5<L>  ----------------------------<  119<H>  3.5   |  27  |  0.60    Ca    8.4      25 Jan 2024 05:30  Phos  1.9     01-25  Mg     2.1     01-25      PT/INR - ( 25 Jan 2024 05:30 )   PT: 12.8 sec;   INR: 1.13          PTT - ( 25 Jan 2024 05:30 )  PTT:26.6 sec  Urinalysis Basic - ( 25 Jan 2024 05:30 )    Color: x / Appearance: x / SG: x / pH: x  Gluc: 119 mg/dL / Ketone: x  / Bili: x / Urobili: x   Blood: x / Protein: x / Nitrite: x   Leuk Esterase: x / RBC: x / WBC x   Sq Epi: x / Non Sq Epi: x / Bacteria: x      CAPILLARY BLOOD GLUCOSE      POCT Blood Glucose.: 153 mg/dL (25 Jan 2024 11:42)  POCT Blood Glucose.: 124 mg/dL (25 Jan 2024 06:23)  POCT Blood Glucose.: 183 mg/dL (24 Jan 2024 21:32)  POCT Blood Glucose.: 127 mg/dL (24 Jan 2024 17:06)      OTHER LABS:        MICRODATA:    Culture - Blood (collected 24 Jan 2024 21:26)  Source: .Blood Blood-Peripheral  Preliminary Report (25 Jan 2024 11:00):    No growth at 12 hours    Culture - Blood (collected 23 Jan 2024 17:45)  Source: .Blood Blood  Preliminary Report (24 Jan 2024 20:00):    No growth at 1 day.    Culture - Blood (collected 23 Jan 2024 17:45)  Source: .Blood Blood  Preliminary Report (24 Jan 2024 20:00):    No growth at 1 day.        #Diet - Diet, Regular:   Consistent Carbohydrate Gestational Diabetic 3 Snacks (CSTCHOGDM) (01-24-24 @ 15:58) [Active]

## 2024-01-25 NOTE — CONSULT NOTE ADULT - NS ATTEND AMEND GEN_ALL_CORE FT
Pt seen on rounds this afternoon.  38-yo man with a PMH significant mostly for Factor 5 Leiden and multiple PEs who presented on 1/22 with abdominal pain, N/V, diarrhea and fever.  Had RLQ tenderness on exam and CT scan showing acute appendicitis.  Underwent appendectomy on 1/23 with significant fever after the procedure which has since resolved.  Has no history of known DM but glucoses were in the 180 range on admission and his A1c level was 9.5%  Has gained a significant amount of weight in the past several years and is currently 138 kg with BMI 42  Of note is a record of an A1c level of 6.6% in 2020, confirming that he was already diabetic at that time  At the time of our visit said that his abdominal discomfort was minimal.  On exam his abdomen was obese but soft, had mild guarding in the R mid-abdomen.  Lungs were clear  Fingersticks are now in the 120-180 range on sliding scale only  --He is almost certainly controllable with oral agents at this point, though needs dietary modification as outpatient.  Will start on metformin and Tradjenta in the hospital  --Can remain on sliding scale coverage  --Needs to be seen by Diabetes Educator  --Diet was discussed.  Needs to modify the types of takeout and "fast" food that he eats, and eliminate the fruit juice (which is likely a major contributor to his hyperglycemia)  --Will need to investigate coverage for a GLP-1, which would clearly be the preferred second agent (along with metformin) as outpatient

## 2024-01-25 NOTE — PROGRESS NOTE ADULT - ASSESSMENT
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 Admitted for perforated appendicitis with appendicolith, s/p robotic assisted appendectomy and IVC filter placement on 1/23, course also significant for new diagnosis DM and fever 1/23.    Reg diet  Lovenox  Zosyn  Pain/nausea control  mISS  home truvada (PrEP)  holding home eliquis (s/p IVC filter)  OOBA/SCD/IS  AM labs

## 2024-01-25 NOTE — PROGRESS NOTE ADULT - SUBJECTIVE AND OBJECTIVE BOX
INTERVAL HPI/OVERNIGHT EVENTS: ON: tachycardia resolved & now afebrile  ----------SDU-----------------  ON: temp 101.2, tylenol given. 2x 1L boluses of LR. Discussed w/ ICU, Team: If septic, broaden to fluconazole, but given fever curve is overall downtrending, continue to monitor.     STATUS POST:  1/23: robotic appendectomy large appendicolith removed,     SUBJECTIVE: Patient seen and examined with chief resident on AM rounds. Patient states his pain is well controlled on current regimen and is tolerating his diet. +Fl/-bm. He has been OOBA. He denies n/v/f/c/sob/cp.         MEDICATIONS  (STANDING):  acetaminophen     Tablet .. 1000 milliGRAM(s) Oral every 6 hours  dextrose 5%. 1000 milliLiter(s) (100 mL/Hr) IV Continuous <Continuous>  dextrose 5%. 1000 milliLiter(s) (50 mL/Hr) IV Continuous <Continuous>  dextrose 50% Injectable 25 Gram(s) IV Push once  dextrose 50% Injectable 25 Gram(s) IV Push once  dextrose 50% Injectable 12.5 Gram(s) IV Push once  emtricitabine 200 mG/tenofovir 300 mG (TRUVADA) 1 Tablet(s) Oral daily  glucagon  Injectable 1 milliGRAM(s) IntraMuscular once  heparin   Injectable 7500 Unit(s) SubCutaneous every 8 hours  insulin lispro (ADMELOG) corrective regimen sliding scale   SubCutaneous Before meals and at bedtime  ketorolac   Injectable 15 milliGRAM(s) IV Push every 6 hours  pantoprazole  Injectable 40 milliGRAM(s) IV Push daily  piperacillin/tazobactam IVPB.. 3.375 Gram(s) IV Intermittent every 8 hours  potassium chloride   Powder 40 milliEquivalent(s) Oral once  potassium phosphate IVPB 15 milliMole(s) IV Intermittent once    MEDICATIONS  (PRN):  benzocaine/menthol Lozenge 1 Lozenge Oral every 2 hours PRN Sore Throat  dextrose Oral Gel 15 Gram(s) Oral once PRN Blood Glucose LESS THAN 70 milliGRAM(s)/deciliter  ondansetron Injectable 4 milliGRAM(s) IV Push every 6 hours PRN Nausea and/or Vomiting  oxyCODONE    IR 5 milliGRAM(s) Oral every 6 hours PRN Moderate Pain (4 - 6)  oxyCODONE    IR 10 milliGRAM(s) Oral every 6 hours PRN Severe Pain (7 - 10)      Vital Signs Last 24 Hrs  T(C): 37.3 (25 Jan 2024 04:52), Max: 39.1 (24 Jan 2024 18:00)  T(F): 99.1 (25 Jan 2024 04:52), Max: 102.3 (24 Jan 2024 18:00)  HR: 82 (25 Jan 2024 03:35) (82 - 112)  BP: 100/55 (25 Jan 2024 03:35) (100/55 - 131/68)  BP(mean): 74 (25 Jan 2024 03:35) (73 - 92)  RR: 17 (25 Jan 2024 03:35) (17 - 29)  SpO2: 97% (25 Jan 2024 03:35) (92% - 98%)    Parameters below as of 25 Jan 2024 03:35  Patient On (Oxygen Delivery Method): room air      PHYSICAL EXAM:  Constitutional: A&Ox3, NAD  Respiratory: non labored breathing, no respiratory distress  Cardiovascular: NSR, RRR  Gastrointestinal: Abd soft, ND, mild TTP RLQ (-) rebound or guarding                 Incision: c/d/i  Extremities: WWP, (+) SCDs in place, no edema          I&O's Detail    24 Jan 2024 07:01  -  25 Jan 2024 07:00  --------------------------------------------------------  IN:    IV PiggyBack: 2000 mL    IV PiggyBack: 200 mL    Lactated Ringers: 100 mL    Oral Fluid: 600 mL  Total IN: 2900 mL    OUT:    Indwelling Catheter - Urethral (mL): 260 mL    Voided (mL): 2450 mL  Total OUT: 2710 mL    Total NET: 190 mL          LABS:                        11.0   16.05 )-----------( 267      ( 25 Jan 2024 05:30 )             34.5     01-25    139  |  104  |  5<L>  ----------------------------<  119<H>  3.5   |  27  |  0.60    Ca    8.4      25 Jan 2024 05:30  Phos  1.9     01-25  Mg     2.1     01-25      PT/INR - ( 25 Jan 2024 05:30 )   PT: 12.8 sec;   INR: 1.13          PTT - ( 25 Jan 2024 05:30 )  PTT:26.6 sec  Urinalysis Basic - ( 25 Jan 2024 05:30 )    Color: x / Appearance: x / SG: x / pH: x  Gluc: 119 mg/dL / Ketone: x  / Bili: x / Urobili: x   Blood: x / Protein: x / Nitrite: x   Leuk Esterase: x / RBC: x / WBC x   Sq Epi: x / Non Sq Epi: x / Bacteria: x        RADIOLOGY & ADDITIONAL STUDIES:

## 2024-01-25 NOTE — CONSULT NOTE ADULT - PROBLEM SELECTOR RECOMMENDATION 9
Type 2 diabetes mellitus with hyperglycemia  - Please continue lantus *** units at bedtime.   - Continue lispro *** units before each meal.  - Continue lispro moderate / low dose sliding scale before meals and at bedtime.  - Patient's fingerstick glucose goal is 100-180 mg/dL.    - CDCES and RD consulted.  - Glucose testing supplies sent to [facility] [place].  - For discharge, patient can ***.    - Patient can follow up at discharge with [facility] by calling [phone] to make an appointment.      Case discussed with Dr. Nicholas. Primary team updated. Type 2 diabetes mellitus with hyperglycemia  - Please continue lantus *** units at bedtime.   - Continue lispro *** units before each meal.  - Continue lispro moderate dose sliding scale before meals and at bedtime.  - Patient's fingerstick glucose goal is 100-180 mg/dL.    - CDCES and RD consulted.  - Glucose testing supplies sent to [facility]. CGM sent.  - For discharge, patient can ***.    - Patient can follow up at discharge with [facility] by calling [phone] to make an appointment.  Office will contact for appt.    Case discussed with Dr. Nicholas. Primary team updated. Type 2 diabetes mellitus with hyperglycemia  - Please start metformin 500mg with breakfast and dinner.  - Please start tradjenta 5mg before breakfast.  - Continue lispro moderate dose sliding scale before meals and at bedtime.  - Patient's fingerstick glucose goal is 100-180 mg/dL.    - CDCES and RD consulted.  - Glucose testing supplies sent to [facility]. CGM sent.  - For discharge, patient can ***.    - Patient can follow up at discharge with [facility] by calling [phone] to make an appointment.  Office will contact for appt.    Case discussed with Dr. Nicholas. Primary team updated.

## 2024-01-25 NOTE — CONSULT NOTE ADULT - ASSESSMENT
37yo Male pt with PMH factor V Leiden presenting with multiple PEs (most recently 2020 s/p thrombectomy) now with 4-day history of abdominal pain. Patient states pain started in b/l LQ and now localized to the right. Had nausea and 1 episode of NBNB emesis. 2 watery bowel movements, passing flatus. Fever on presentation, chills at home. Mild SOB which he attributed to pain.  Admitted for perforated appendicitis with appendicolith, sp robotic assisted appendectomy and IVC filter placement on 1/23    A1C: 9.5 %. 6.6% in 2020  BUN: 5  Creatinine: 0.60  GFR: 126  Weight: 138.4  BMI: 41.4 37yo Male pt with PMH factor V Leiden presenting with multiple PEs (most recently 2020 s/p thrombectomy) now with 4-day history of abdominal pain and 1 days hx of N/V and chills at home, admitted for appendicitis and s/p temp IVC filter and Robot-assisted appendectomy on 1/23.    Endocrine consulted for new type 2 diabetes diagnosis.    A1C: 9.5 %. (6.6% in 2020)  BUN: 5  Creatinine: 0.60  GFR: 126  Weight: 138.4  BMI: 41.4

## 2024-01-26 ENCOUNTER — TRANSCRIPTION ENCOUNTER (OUTPATIENT)
Age: 39
End: 2024-01-26

## 2024-01-26 DIAGNOSIS — D62 ACUTE POSTHEMORRHAGIC ANEMIA: ICD-10-CM

## 2024-01-26 DIAGNOSIS — Z79.899 OTHER LONG TERM (CURRENT) DRUG THERAPY: ICD-10-CM

## 2024-01-26 DIAGNOSIS — K35.32 ACUTE APPENDICITIS WITH PERFORATION, LOCALIZED PERITONITIS, AND GANGRENE, WITHOUT ABSCESS: ICD-10-CM

## 2024-01-26 DIAGNOSIS — D68.51 ACTIVATED PROTEIN C RESISTANCE: ICD-10-CM

## 2024-01-26 DIAGNOSIS — A41.50 GRAM-NEGATIVE SEPSIS, UNSPECIFIED: ICD-10-CM

## 2024-01-26 DIAGNOSIS — Z29.9 ENCOUNTER FOR PROPHYLACTIC MEASURES, UNSPECIFIED: ICD-10-CM

## 2024-01-26 DIAGNOSIS — E11.65 TYPE 2 DIABETES MELLITUS WITH HYPERGLYCEMIA: ICD-10-CM

## 2024-01-26 LAB
ANION GAP SERPL CALC-SCNC: 12 MMOL/L — SIGNIFICANT CHANGE UP (ref 5–17)
APTT BLD: 30.5 SEC — SIGNIFICANT CHANGE UP (ref 24.5–35.6)
BLD GP AB SCN SERPL QL: NEGATIVE — SIGNIFICANT CHANGE UP
BUN SERPL-MCNC: 6 MG/DL — LOW (ref 7–23)
CALCIUM SERPL-MCNC: 8.2 MG/DL — LOW (ref 8.4–10.5)
CHLORIDE SERPL-SCNC: 104 MMOL/L — SIGNIFICANT CHANGE UP (ref 96–108)
CO2 SERPL-SCNC: 24 MMOL/L — SIGNIFICANT CHANGE UP (ref 22–31)
CREAT SERPL-MCNC: 0.57 MG/DL — SIGNIFICANT CHANGE UP (ref 0.5–1.3)
EGFR: 128 ML/MIN/1.73M2 — SIGNIFICANT CHANGE UP
GLUCOSE BLDC GLUCOMTR-MCNC: 119 MG/DL — HIGH (ref 70–99)
GLUCOSE BLDC GLUCOMTR-MCNC: 148 MG/DL — HIGH (ref 70–99)
GLUCOSE BLDC GLUCOMTR-MCNC: 183 MG/DL — HIGH (ref 70–99)
GLUCOSE SERPL-MCNC: 135 MG/DL — HIGH (ref 70–99)
HCT VFR BLD CALC: 34.7 % — LOW (ref 39–50)
HGB BLD-MCNC: 11.1 G/DL — LOW (ref 13–17)
INR BLD: 1.06 — SIGNIFICANT CHANGE UP (ref 0.85–1.18)
LMWH PPP CHRO-ACNC: 0.52 IU/ML — SIGNIFICANT CHANGE UP (ref 0.5–1.1)
MAGNESIUM SERPL-MCNC: 2.1 MG/DL — SIGNIFICANT CHANGE UP (ref 1.6–2.6)
MCHC RBC-ENTMCNC: 26.4 PG — LOW (ref 27–34)
MCHC RBC-ENTMCNC: 32 GM/DL — SIGNIFICANT CHANGE UP (ref 32–36)
MCV RBC AUTO: 82.6 FL — SIGNIFICANT CHANGE UP (ref 80–100)
NRBC # BLD: 0 /100 WBCS — SIGNIFICANT CHANGE UP (ref 0–0)
PHOSPHATE SERPL-MCNC: 1.9 MG/DL — LOW (ref 2.5–4.5)
PLATELET # BLD AUTO: 337 K/UL — SIGNIFICANT CHANGE UP (ref 150–400)
POTASSIUM SERPL-MCNC: 3.5 MMOL/L — SIGNIFICANT CHANGE UP (ref 3.5–5.3)
POTASSIUM SERPL-SCNC: 3.5 MMOL/L — SIGNIFICANT CHANGE UP (ref 3.5–5.3)
PROTHROM AB SERPL-ACNC: 12.1 SEC — SIGNIFICANT CHANGE UP (ref 9.5–13)
RBC # BLD: 4.2 M/UL — SIGNIFICANT CHANGE UP (ref 4.2–5.8)
RBC # FLD: 12.9 % — SIGNIFICANT CHANGE UP (ref 10.3–14.5)
RH IG SCN BLD-IMP: POSITIVE — SIGNIFICANT CHANGE UP
SODIUM SERPL-SCNC: 140 MMOL/L — SIGNIFICANT CHANGE UP (ref 135–145)
WBC # BLD: 15 K/UL — HIGH (ref 3.8–10.5)
WBC # FLD AUTO: 15 K/UL — HIGH (ref 3.8–10.5)

## 2024-01-26 PROCEDURE — 99232 SBSQ HOSP IP/OBS MODERATE 35: CPT

## 2024-01-26 PROCEDURE — 99233 SBSQ HOSP IP/OBS HIGH 50: CPT | Mod: GC

## 2024-01-26 RX ORDER — SODIUM,POTASSIUM PHOSPHATES 278-250MG
1 POWDER IN PACKET (EA) ORAL ONCE
Refills: 0 | Status: COMPLETED | OUTPATIENT
Start: 2024-01-26 | End: 2024-01-26

## 2024-01-26 RX ORDER — LINAGLIPTIN 5 MG/1
5 TABLET, FILM COATED ORAL EVERY 24 HOURS
Refills: 0 | Status: DISCONTINUED | OUTPATIENT
Start: 2024-01-26 | End: 2024-01-27

## 2024-01-26 RX ORDER — METFORMIN HYDROCHLORIDE 850 MG/1
500 TABLET ORAL
Refills: 0 | Status: DISCONTINUED | OUTPATIENT
Start: 2024-01-26 | End: 2024-01-27

## 2024-01-26 RX ORDER — APIXABAN 2.5 MG/1
5 TABLET, FILM COATED ORAL EVERY 12 HOURS
Refills: 0 | Status: DISCONTINUED | OUTPATIENT
Start: 2024-01-26 | End: 2024-01-27

## 2024-01-26 RX ORDER — SEMAGLUTIDE 0.68 MG/ML
0.25 INJECTION, SOLUTION SUBCUTANEOUS
Qty: 1 | Refills: 0
Start: 2024-01-26

## 2024-01-26 RX ORDER — TIRZEPATIDE 15 MG/.5ML
2.5 INJECTION, SOLUTION SUBCUTANEOUS
Qty: 4 | Refills: 0
Start: 2024-01-26

## 2024-01-26 RX ORDER — POTASSIUM CHLORIDE 20 MEQ
40 PACKET (EA) ORAL ONCE
Refills: 0 | Status: COMPLETED | OUTPATIENT
Start: 2024-01-26 | End: 2024-01-26

## 2024-01-26 RX ADMIN — Medication 15 MILLIGRAM(S): at 00:09

## 2024-01-26 RX ADMIN — Medication 1000 MILLIGRAM(S): at 18:34

## 2024-01-26 RX ADMIN — PIPERACILLIN AND TAZOBACTAM 25 GRAM(S): 4; .5 INJECTION, POWDER, LYOPHILIZED, FOR SOLUTION INTRAVENOUS at 16:41

## 2024-01-26 RX ADMIN — PIPERACILLIN AND TAZOBACTAM 25 GRAM(S): 4; .5 INJECTION, POWDER, LYOPHILIZED, FOR SOLUTION INTRAVENOUS at 06:57

## 2024-01-26 RX ADMIN — Medication 40 MILLIEQUIVALENT(S): at 13:41

## 2024-01-26 RX ADMIN — Medication 15 MILLIGRAM(S): at 12:25

## 2024-01-26 RX ADMIN — PANTOPRAZOLE SODIUM 40 MILLIGRAM(S): 20 TABLET, DELAYED RELEASE ORAL at 12:23

## 2024-01-26 RX ADMIN — Medication 2: at 12:23

## 2024-01-26 RX ADMIN — Medication 1000 MILLIGRAM(S): at 19:20

## 2024-01-26 RX ADMIN — EMTRICITABINE AND TENOFOVIR DISOPROXIL FUMARATE 1 TABLET(S): 200; 300 TABLET, FILM COATED ORAL at 12:23

## 2024-01-26 RX ADMIN — Medication 15 MILLIGRAM(S): at 12:20

## 2024-01-26 RX ADMIN — PIPERACILLIN AND TAZOBACTAM 25 GRAM(S): 4; .5 INJECTION, POWDER, LYOPHILIZED, FOR SOLUTION INTRAVENOUS at 22:14

## 2024-01-26 RX ADMIN — APIXABAN 5 MILLIGRAM(S): 2.5 TABLET, FILM COATED ORAL at 18:34

## 2024-01-26 RX ADMIN — Medication 15 MILLIGRAM(S): at 06:50

## 2024-01-26 RX ADMIN — Medication 1000 MILLIGRAM(S): at 12:24

## 2024-01-26 RX ADMIN — METFORMIN HYDROCHLORIDE 500 MILLIGRAM(S): 850 TABLET ORAL at 18:35

## 2024-01-26 RX ADMIN — Medication 15 MILLIGRAM(S): at 07:01

## 2024-01-26 RX ADMIN — Medication 1000 MILLIGRAM(S): at 06:51

## 2024-01-26 RX ADMIN — Medication 1000 MILLIGRAM(S): at 00:09

## 2024-01-26 RX ADMIN — Medication 1000 MILLIGRAM(S): at 12:50

## 2024-01-26 RX ADMIN — Medication 1000 MILLIGRAM(S): at 07:01

## 2024-01-26 RX ADMIN — ENOXAPARIN SODIUM 120 MILLIGRAM(S): 100 INJECTION SUBCUTANEOUS at 06:51

## 2024-01-26 RX ADMIN — Medication 1 PACKET(S): at 16:18

## 2024-01-26 NOTE — PROGRESS NOTE ADULT - PROBLEM SELECTOR PLAN 4
- On eliquis at home.   - Temp IVC filter placed 1/23. Follow up outpatient for removal   - Started on FD lovenox, plan to transition to eliquis tonight

## 2024-01-26 NOTE — PROGRESS NOTE ADULT - SUBJECTIVE AND OBJECTIVE BOX
INTERVAL HPI/OVERNIGHT EVENTS: elia diet, -NV, ambulating, +F/+BM, afebrile, VSS    STATUS POST:  1/23: robotic appendectomy    SUBJECTIVE: Patient seen and examined with team on AM rounds. Patient has no acute complaints this morning. He is tolerating his diet and his pain is well controlled. +fl/_bm. Patient has been out of bed ambulating. Denies f/c/n/v/sob/cp    MEDICATIONS  (STANDING):  acetaminophen     Tablet .. 1000 milliGRAM(s) Oral every 6 hours  dextrose 5%. 1000 milliLiter(s) (50 mL/Hr) IV Continuous <Continuous>  dextrose 5%. 1000 milliLiter(s) (100 mL/Hr) IV Continuous <Continuous>  dextrose 50% Injectable 25 Gram(s) IV Push once  dextrose 50% Injectable 12.5 Gram(s) IV Push once  dextrose 50% Injectable 25 Gram(s) IV Push once  emtricitabine 200 mG/tenofovir 300 mG (TRUVADA) 1 Tablet(s) Oral daily  enoxaparin Injectable 120 milliGRAM(s) SubCutaneous every 12 hours  glucagon  Injectable 1 milliGRAM(s) IntraMuscular once  insulin lispro (ADMELOG) corrective regimen sliding scale   SubCutaneous Before meals and at bedtime  ketorolac   Injectable 15 milliGRAM(s) IV Push every 6 hours  pantoprazole  Injectable 40 milliGRAM(s) IV Push daily  piperacillin/tazobactam IVPB.. 3.375 Gram(s) IV Intermittent every 8 hours    MEDICATIONS  (PRN):  benzocaine/menthol Lozenge 1 Lozenge Oral every 2 hours PRN Sore Throat  dextrose Oral Gel 15 Gram(s) Oral once PRN Blood Glucose LESS THAN 70 milliGRAM(s)/deciliter  ondansetron Injectable 4 milliGRAM(s) IV Push every 6 hours PRN Nausea and/or Vomiting  oxyCODONE    IR 5 milliGRAM(s) Oral every 6 hours PRN Moderate Pain (4 - 6)  oxyCODONE    IR 10 milliGRAM(s) Oral every 6 hours PRN Severe Pain (7 - 10)      Vital Signs Last 24 Hrs  T(C): 36.9 (26 Jan 2024 04:20), Max: 37.1 (25 Jan 2024 21:00)  T(F): 98.5 (26 Jan 2024 04:20), Max: 98.8 (25 Jan 2024 21:00)  HR: 80 (26 Jan 2024 04:00) (80 - 88)  BP: 121/71 (26 Jan 2024 04:00) (118/62 - 128/64)  BP(mean): 90 (26 Jan 2024 04:00) (82 - 95)  RR: 15 (26 Jan 2024 04:00) (15 - 18)  SpO2: 94% (26 Jan 2024 04:00) (94% - 96%)    Parameters below as of 26 Jan 2024 04:00  Patient On (Oxygen Delivery Method): room air        PHYSICAL EXAM:  Constitutional: A&Ox3, NAD  Respiratory: non labored breathing, no respiratory distress  Cardiovascular: NSR, RRR  Gastrointestinal: Abd soft, appropriately tender LLQ, ND. (-) rebound or guarding                Incision: c/d/i  Genitourinary: voiding  Extremities: WWP, (-) calf edema, SCDs in place          I&O's Detail    24 Jan 2024 07:01  -  25 Jan 2024 07:00  --------------------------------------------------------  IN:    IV PiggyBack: 2000 mL    IV PiggyBack: 200 mL    Lactated Ringers: 100 mL    Oral Fluid: 600 mL  Total IN: 2900 mL    OUT:    Indwelling Catheter - Urethral (mL): 260 mL    Voided (mL): 2450 mL  Total OUT: 2710 mL    Total NET: 190 mL      25 Jan 2024 07:01  -  26 Jan 2024 06:56  --------------------------------------------------------  IN:    IV PiggyBack: 100 mL    Oral Fluid: 240 mL  Total IN: 340 mL    OUT:    Voided (mL): 1500 mL  Total OUT: 1500 mL    Total NET: -1160 mL          LABS:                        11.0   16.05 )-----------( 267      ( 25 Jan 2024 05:30 )             34.5     01-25    139  |  104  |  5<L>  ----------------------------<  119<H>  3.5   |  27  |  0.60    Ca    8.4      25 Jan 2024 05:30  Phos  1.9     01-25  Mg     2.1     01-25      PT/INR - ( 25 Jan 2024 05:30 )   PT: 12.8 sec;   INR: 1.13          PTT - ( 25 Jan 2024 05:30 )  PTT:26.6 sec  Urinalysis Basic - ( 25 Jan 2024 05:30 )    Color: x / Appearance: x / SG: x / pH: x  Gluc: 119 mg/dL / Ketone: x  / Bili: x / Urobili: x   Blood: x / Protein: x / Nitrite: x   Leuk Esterase: x / RBC: x / WBC x   Sq Epi: x / Non Sq Epi: x / Bacteria: x        RADIOLOGY & ADDITIONAL STUDIES:

## 2024-01-26 NOTE — CHART NOTE - NSCHARTNOTEFT_GEN_A_CORE
NUTRITION BRIEF NOTE    RD consult received for assessment/education on 1/25/24. Nutrition assessment/education completed previously 1/24/24 and patient noted with weight stability and good PO intake. Upon discussion with team, will follow-up with Pt regarding new diabetes diagnosis upon reassessment.     DOMINIC Hylton, MS, RD, CDN  Registered Dietitian  [id number]

## 2024-01-26 NOTE — DISCHARGE NOTE PROVIDER - CARE PROVIDER_API CALL
Darshan Akins  Surgery  [address]  [place] [address]  Phone: [phone]  Fax: [phone]  Follow Up Time:

## 2024-01-26 NOTE — PROGRESS NOTE ADULT - PROBLEM SELECTOR PLAN 3
- A1C: 9.5, Endo recs appreciated- start metformin 500mg with breakfast and dinner and tradjenta 5mg before breakfast.  - c/w ISS, monitor fs

## 2024-01-26 NOTE — PROGRESS NOTE ADULT - SUBJECTIVE AND OBJECTIVE BOX
SUBJECTIVE / INTERVAL HPI: Patient was seen and examined this morning. Denies abd pain, N/V. Eating well. Glucose mostly at target on sliding scale only and consistent carb diet. Metformin and tradjenta started today. Educated on Mounjaro injection and patient showed competence thru return demonstration with demo pen. Written directions provided. Reviewed carbs and provided quick carb count and info for [employer] and Calorie Donovan apps to become familiar with nutritional content of food. Aim for approx 45 gm carb per meal. Stop juice.    CAPILLARY BLOOD GLUCOSE & INSULIN RECEIVED  124 mg/dL (01-25 @ 06:23)  153 mg/dL (01-25 @ 11:42)  127 mg/dL (01-25 @ 16:53)  168 mg/dL (01-25 @ 22:11)  135 mg/dL (01-26 @ 05:30)  119 mg/dL (01-26 @ 06:09)  183 mg/dL (01-26 @ 11:27)  148 mg/dL (01-26 @ 15:53)      REVIEW OF SYSTEMS  Constitutional:  Negative fever, chills or loss of appetite.  Eyes:  Negative blurry vision or double vision.  Cardiovascular:  Negative for chest pain or palpitations.  Respiratory:  Negative for cough, wheezing, or shortness of breath.    Gastrointestinal:  Negative for nausea, vomiting, diarrhea, constipation, or abdominal pain.  Genitourinary:  Negative frequency, urgency or dysuria.  Neurologic:  No headache, confusion, dizziness, lightheadedness.    PHYSICAL EXAM  Vital Signs Last 24 Hrs  T(C): 36.9 (26 Jan 2024 14:31), Max: 37.1 (25 Jan 2024 21:00)  T(F): 98.5 (26 Jan 2024 14:31), Max: 98.8 (25 Jan 2024 21:00)  HR: 80 (26 Jan 2024 16:46) (80 - 88)  BP: 136/74 (26 Jan 2024 16:46) (121/71 - 138/63)  BP(mean): 98 (26 Jan 2024 16:46) (88 - 98)  RR: 17 (26 Jan 2024 16:46) (15 - 18)  SpO2: 97% (26 Jan 2024 16:46) (94% - 98%)    Parameters below as of 26 Jan 2024 16:46  Patient On (Oxygen Delivery Method): room air        Constitutional: Awake, alert, in no acute distress.   HEENT: Normocephalic, atraumatic, LA[place].  Respiratory: Lungs clear to ausculation bilaterally.   Cardiovascular: regular rhythm, normal S1 and S2, no audible murmurs.   GI: soft, non-tender, non-distended, bowel sounds present.  Extremities: No lower extremity edema.  Psychiatric: AAO x 3. Normal affect/mood.     LABS  CBC - WBC/HGB/HTC/PLT: 15.00/11.1/34.7/337 (01-26-24)  BMP - Na/K/Cl/Bicarb/BUN/Cr/Gluc/AG/eGFR: 140/3.5/104/24/6/0.57/135/12/128 (01-26-24)  Ca - 8.2 (01-26-24)  Phos - 1.9 (01-26-24)  Mg - 2.1 (01-26-24)  LFT - Alb/Tprot/Tbili/Dbili/AlkPhos/ALT/AST: 3.3/--/0.9/--/99/46/20 (01-23-24)  PT/aPTT/INR: 12.1/30.5/1.06 (01-26-24)       MEDICATIONS  MEDICATIONS  (STANDING):  acetaminophen     Tablet .. 1000 milliGRAM(s) Oral every 6 hours  dextrose 5%. 1000 milliLiter(s) (50 mL/Hr) IV Continuous <Continuous>  dextrose 5%. 1000 milliLiter(s) (100 mL/Hr) IV Continuous <Continuous>  dextrose 50% Injectable 25 Gram(s) IV Push once  dextrose 50% Injectable 12.5 Gram(s) IV Push once  dextrose 50% Injectable 25 Gram(s) IV Push once  emtricitabine 200 mG/tenofovir 300 mG (TRUVADA) 1 Tablet(s) Oral daily  enoxaparin Injectable 120 milliGRAM(s) SubCutaneous every 12 hours  glucagon  Injectable 1 milliGRAM(s) IntraMuscular once  insulin lispro (ADMELOG) corrective regimen sliding scale   SubCutaneous Before meals and at bedtime  linagliptin 5 milliGRAM(s) Oral every 24 hours  metFORMIN 500 milliGRAM(s) Oral two times a day with meals  pantoprazole  Injectable 40 milliGRAM(s) IV Push daily  piperacillin/tazobactam IVPB.. 3.375 Gram(s) IV Intermittent every 8 hours  potassium phosphate / sodium phosphate Powder (PHOS-NaK) 1 Packet(s) Oral once    MEDICATIONS  (PRN):  benzocaine/menthol Lozenge 1 Lozenge Oral every 2 hours PRN Sore Throat  dextrose Oral Gel 15 Gram(s) Oral once PRN Blood Glucose LESS THAN 70 milliGRAM(s)/deciliter  ondansetron Injectable 4 milliGRAM(s) IV Push every 6 hours PRN Nausea and/or Vomiting  oxyCODONE    IR 5 milliGRAM(s) Oral every 6 hours PRN Moderate Pain (4 - 6)  oxyCODONE    IR 10 milliGRAM(s) Oral every 6 hours PRN Severe Pain (7 - 10)

## 2024-01-26 NOTE — PROGRESS NOTE ADULT - SUBJECTIVE AND OBJECTIVE BOX
Patient is a 38y old  Male who presents with a chief complaint of perforated appendicitis (26 Jan 2024 06:56)      INTERVAL HPI/OVERNIGHT EVENTS: offers no new complaints. States he feels well this am. No fevers over night.     MEDICATIONS  (STANDING):  acetaminophen     Tablet .. 1000 milliGRAM(s) Oral every 6 hours  dextrose 5%. 1000 milliLiter(s) (50 mL/Hr) IV Continuous <Continuous>  dextrose 5%. 1000 milliLiter(s) (100 mL/Hr) IV Continuous <Continuous>  dextrose 50% Injectable 25 Gram(s) IV Push once  dextrose 50% Injectable 12.5 Gram(s) IV Push once  dextrose 50% Injectable 25 Gram(s) IV Push once  emtricitabine 200 mG/tenofovir 300 mG (TRUVADA) 1 Tablet(s) Oral daily  enoxaparin Injectable 120 milliGRAM(s) SubCutaneous every 12 hours  glucagon  Injectable 1 milliGRAM(s) IntraMuscular once  insulin lispro (ADMELOG) corrective regimen sliding scale   SubCutaneous Before meals and at bedtime  ketorolac   Injectable 15 milliGRAM(s) IV Push every 6 hours  pantoprazole  Injectable 40 milliGRAM(s) IV Push daily  piperacillin/tazobactam IVPB.. 3.375 Gram(s) IV Intermittent every 8 hours    MEDICATIONS  (PRN):  benzocaine/menthol Lozenge 1 Lozenge Oral every 2 hours PRN Sore Throat  dextrose Oral Gel 15 Gram(s) Oral once PRN Blood Glucose LESS THAN 70 milliGRAM(s)/deciliter  ondansetron Injectable 4 milliGRAM(s) IV Push every 6 hours PRN Nausea and/or Vomiting  oxyCODONE    IR 10 milliGRAM(s) Oral every 6 hours PRN Severe Pain (7 - 10)  oxyCODONE    IR 5 milliGRAM(s) Oral every 6 hours PRN Moderate Pain (4 - 6)      Vital Signs Last 24 Hrs  T(C): 36.9 (26 Jan 2024 09:02), Max: 37.1 (25 Jan 2024 21:00)  T(F): 98.5 (26 Jan 2024 09:02), Max: 98.8 (25 Jan 2024 21:00)  HR: 84 (26 Jan 2024 08:30) (80 - 88)  BP: 138/63 (26 Jan 2024 08:30) (121/71 - 138/63)  BP(mean): 91 (26 Jan 2024 08:30) (88 - 95)  RR: 17 (26 Jan 2024 08:30) (15 - 18)  SpO2: 95% (26 Jan 2024 08:30) (94% - 96%)    Parameters below as of 26 Jan 2024 08:30  Patient On (Oxygen Delivery Method): room air        ________________________________________________  PHYSICAL EXAM:  GENERAL: NAD  HEENT: Normocephalic;  conjunctivae and sclerae clear; moist mucous membranes;   NECK : supple  CHEST/LUNG: Clear to auscultation bilaterally with good air entry   HEART: S1 S2  regular; no murmurs, gallops or rubs  ABDOMEN: Soft, Nontender, Nondistended; Bowel sounds present  EXTREMITIES: no cyanosis; no edema; no calf tenderness  SKIN: warm and dry; no rash  NERVOUS SYSTEM:  Awake and alert; Oriented  to place, person and time ; no new deficits    _________________________________________________  LABS:                        11.1   15.00 )-----------( 337      ( 26 Jan 2024 05:30 )             34.7     01-26    140  |  104  |  6<L>  ----------------------------<  135<H>  3.5   |  24  |  0.57    Ca    8.2<L>      26 Jan 2024 05:30  Phos  1.9     01-26  Mg     2.1     01-26      PT/INR - ( 26 Jan 2024 05:30 )   PT: 12.1 sec;   INR: 1.06          PTT - ( 26 Jan 2024 05:30 )  PTT:30.5 sec  Urinalysis Basic - ( 26 Jan 2024 05:30 )    Color: x / Appearance: x / SG: x / pH: x  Gluc: 135 mg/dL / Ketone: x  / Bili: x / Urobili: x   Blood: x / Protein: x / Nitrite: x   Leuk Esterase: x / RBC: x / WBC x   Sq Epi: x / Non Sq Epi: x / Bacteria: x      CAPILLARY BLOOD GLUCOSE      POCT Blood Glucose.: 119 mg/dL (26 Jan 2024 06:09)  POCT Blood Glucose.: 168 mg/dL (25 Jan 2024 22:11)  POCT Blood Glucose.: 127 mg/dL (25 Jan 2024 16:53)  POCT Blood Glucose.: 153 mg/dL (25 Jan 2024 11:42)        RADIOLOGY & ADDITIONAL TESTS:      Plan of care was discussed with patient and /or primary care giver; all questions and concerns were addressed and care was aligned with patient's wishes.

## 2024-01-26 NOTE — PROGRESS NOTE ADULT - PROBLEM SELECTOR PLAN 1
ype 2 diabetes mellitus with hyperglycemia  - Please continue metformin 500mg with breakfast and dinner.  - Please continue tradjenta 5mg before breakfast.  - Continue lispro moderate dose sliding scale before meals and at bedtime.  - Patient's fingerstick glucose goal is 100-180 mg/dL.    - CDCES and RD consulted.  - For discharge: Metformin ER 500mg BID and Mounjaro 2.5 mg weekly. Mounjaro education provided. Dexcom and glucose testing supplies delivered to bedside.   - Patient can follow up at discharge with [facility] by calling [phone] to make an appointment.  Office will contact for appt.    Case discussed with Dr. Nicholsa. Primary team updated.
- Perforated appendicitis with appendicolith, sp robotic assisted appendectomy and IVC filter placement on 1/23  - Spiked a high grade post op fever of 104 on 1/23 @17:00. Temp 102.3 on 1/24 @ 18:00. Blood cultures 1/22, 1/23, 1/24 NGTD.   - No fever in >24hr, wbc trending down   - Follow blood cx  - Continue zosyn.

## 2024-01-26 NOTE — DISCHARGE NOTE PROVIDER - HOSPITAL COURSE
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  Patient reported constant sharp progressively worsening in the lower abd pain w/ loose watery stools and nbnb emesis. Patient underwent CT angio of chest for shortness of breath and was negative for pathology. CTAP showed dilated appendix with multiple appendicoliths, appendiceal wall thickening and extensive infiltration of periappendiceal fat, extraluminal gas in the right lower quadrant, and reactive wall thickening of sigmoid colon adjacent to appendix. Patient started on IV zosyn for antibiotics. On 1/23 patient underwent robotic appendectomy, abdominal washout, and IVCF placement by vascular surgery. Patient developed a fever on POD0 and POD3, blood cultures sent, and treated with IVF and tylenol. The remainder of his postoperative course was unremarkable with advancement of diet, passing trial of void, and pain control. On day of discharge patient was stable to be discharged home.     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  Patient reported constant sharp progressively worsening in the lower abd pain w/ loose watery stools and nbnb emesis. Patient underwent CT angio of chest for shortness of breath and was negative for pathology. CTAP showed dilated appendix with multiple appendicoliths, appendiceal wall thickening and extensive infiltration of periappendiceal fat, extraluminal gas in the right lower quadrant, and reactive wall thickening of sigmoid colon adjacent to appendix. Patient started on IV zosyn for antibiotics. On 1/23 patient underwent robotic appendectomy, abdominal washout, and IVCF placement by vascular surgery. Patient developed a fever on POD0 and POD3, blood cultures sent, and treated with IVF and tylenol. The remainder of his postoperative course was unremarkable with advancement of diet, passing trial of void, and pain control. On day of discharge patient was stable to be discharged home.

## 2024-01-26 NOTE — DISCHARGE NOTE PROVIDER - NSCORESITESY/N_GEN_A_CORE_RD
DATE OF SERVICE: 7/26/2017     Indication for Procedure: Patient is here for the second of three left knee joint injection with Euflexxa because of chronic knee pain unresponsive to previous conservative therapy with medications and physical therapy.     Preoperative Diagnosis: left knee osteoarthritis and chronic knee pain     Postoperative Diagnosis:  Same        Procedure: left knee joint injection with Euflexxa     Local Anesthesia     Complications: None     Blood Loss: None     Procedure Note:     After informed consent was obtained from the patient, the patient was then placed in the supine position.      Correct site of injection was marked before the procedure and time out was performed to ensure the correct site and type of injection and correct patient with 2 identifiers.     Sterile prep with alcohol solution, ethylene chloride spray used for skin local anesthesia. A 25-gauge needle was used to inject 2 cc of  Euflexxa (1% sodium hyaluronate) solution was injected into the left knee joint lateral approach. The needle was removed at the end of the procedure and sterile dressing placed.      Patient tolerated the procedure well without any adverse reactions, discharge instructions given to the patient and will be followed up for repeat injection if necessary.     Discharge instructions were given which included application of ice pack over injection site and to continue taking any anti-inflammatory or pain medications. Activity limitations include avoiding strenuous bending, twisting of hips.     Lei Calhoun MD     
This document is complete and the patient is ready for discharge.
No

## 2024-01-26 NOTE — PROGRESS NOTE ADULT - TIME BILLING
More than 50 percent of which was spent on counseling and coordination of care.
Review of hospital course, labs, vitals, medical records.   Bedside exam and interview   Discussed plan of care with primary team ACP and housestaff   Documenting the encounter

## 2024-01-26 NOTE — PROGRESS NOTE ADULT - PROBLEM SELECTOR PLAN 5
-Drop in h/h from 14.5 on admission to 11  -Continue to monitor H/H daily   - No signs of active bleeding   - Active type and screen

## 2024-01-26 NOTE — DISCHARGE NOTE PROVIDER - NSDCFUSCHEDAPPT_GEN_ALL_CORE_FT
Gen Serrano  [facility]  VASCULAR [address]  Scheduled Appointment: 02/15/2024

## 2024-01-26 NOTE — PROGRESS NOTE ADULT - ASSESSMENT
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 Admitted for perforated appendicitis with appendicolith, sp robotic assisted appendectomy and IVC filter placement on 1/23

## 2024-01-26 NOTE — DISCHARGE NOTE PROVIDER - NSDCCPTREATMENT_GEN_ALL_CORE_FT
PRINCIPAL PROCEDURE  Procedure: Robot-assisted appendectomy  Findings and Treatment:       SECONDARY PROCEDURE  Procedure: IVC filter placement  Findings and Treatment:

## 2024-01-26 NOTE — PROGRESS NOTE ADULT - PROBLEM SELECTOR PLAN 2
- Continue Zosyn, add vanc if continues to spike high grade fevers  - s/p appendectomy 1/23  - Management as per primary team

## 2024-01-26 NOTE — DISCHARGE NOTE PROVIDER - NSDCMRMEDTOKEN_GEN_ALL_CORE_FT
alcohol swabs: Apply topically to affected area 4 times a day  apixaban 5 mg oral tablet: 2 tab(s) orally every 12 hours for the first 6 days and then 1 tablet every 12 hours thereafter.   Dexcom G7 REader: Use as directed  Dexcom G7 Sensors: Change every 10 days  glucometer (per patient&#x27;s insurance): Test blood sugars four times a day. Dispense #1 glucometer.  lancets: 1 application subcutaneously 4 times a day  Mounjaro 2.5 mg/0.5 mL subcutaneous solution: 2.5 milligram(s) subcutaneously once a week  test strips (per patient&#x27;s insurance): 1 application subcutaneously 4 times a day. ** Compatible with patient&#x27;s glucometer **

## 2024-01-26 NOTE — PROGRESS NOTE ADULT - ASSESSMENT
37yo Male pt with PMH factor V Leiden presenting with multiple PEs (most recently 2020 s/p thrombectomy) now with 4-day history of abdominal pain and 1 days hx of N/V and chills at home, admitted for appendicitis and s/p temp IVC filter and Robot-assisted appendectomy on 1/23.    Endocrine consulted for new type 2 diabetes diagnosis.    A1C: 9.5 %. (6.6% in 2020)  BUN: 6  Creatinine: 0.57  GFR: 128  Weight: 138.4  BMI: 41.4

## 2024-01-26 NOTE — DISCHARGE NOTE PROVIDER - NSDCFUADDINST_GEN_ALL_CORE_FT
General Discharge Instructions:  Please follow up with Dr. Akins in 1 week. Please call [phone] to schedule your appointment. Please follow up with your vascular surgeon Dr. Serrano at your scheduled appointment on February 15, 2024 at 9:30 A.M. You may follow up with your PCP or [facility] for your newly diagnosed Type 2 diabetes mellitus. You may call [phone] to schedule an appointment.   Please get plenty of rest, continue to ambulate several times per day, and drink adequate amounts of fluids. Avoid lifting weights greater than 5-10 lbs until you follow-up with your surgeon, who will instruct you further regarding activity restrictions.    Medications:  Please resume all home meds unless specified not to. You have been prescribed oral antibiotics. Please be sure to complete the entire course as directed.  You have been newly diagnosed with Type 2 Diabetes Mellitus. You have been prescribed metformin extended release 500 mg twice a day. You have also been prescribed Mounjaro 2.5 mg weekly. Please take as prescribed.     Incision Care:  *Please call your doctor if you have increased pain, swelling, redness, or drainage from the incision site.  *Avoid swimming and baths until your follow-up appointment.  *You may shower, and wash surgical incisions with a mild soap and warm water. Gently pat the area dry.    Warning Signs:  Please call your doctor if you experience the following:  *You experience new chest pain, pressure, squeezing or tightness.  *New or worsening cough, shortness of breath, or wheeze.  *If you are vomiting and cannot keep down fluids or your medications.  *You are getting dehydrated due to continued vomiting, diarrhea, or other reasons. Signs of dehydration include dry mouth, rapid heartbeat, or feeling dizzy or faint when standing.  *You see blood or dark/black material when you vomit or have a bowel movement.  *You experience burning when you urinate, have blood in your urine, or experience a discharge.  *Your pain is not improving within 8-12 hours or is not gone within 24 hours. Call or return immediately if your pain is getting worse, changes location, or moves to your chest or back.  *You have shaking chills, or fever greater than 101.5 degrees Fahrenheit or 38 degrees Celsius.  *Any change in your symptoms, or any new symptoms that concern you.

## 2024-01-27 ENCOUNTER — TRANSCRIPTION ENCOUNTER (OUTPATIENT)
Age: 39
End: 2024-01-27

## 2024-01-27 VITALS — TEMPERATURE: 97 F

## 2024-01-27 LAB
ANION GAP SERPL CALC-SCNC: 16 MMOL/L — SIGNIFICANT CHANGE UP (ref 5–17)
BUN SERPL-MCNC: 5 MG/DL — LOW (ref 7–23)
CALCIUM SERPL-MCNC: 8.8 MG/DL — SIGNIFICANT CHANGE UP (ref 8.4–10.5)
CHLORIDE SERPL-SCNC: 100 MMOL/L — SIGNIFICANT CHANGE UP (ref 96–108)
CO2 SERPL-SCNC: 21 MMOL/L — LOW (ref 22–31)
CREAT SERPL-MCNC: 0.55 MG/DL — SIGNIFICANT CHANGE UP (ref 0.5–1.3)
CULTURE RESULTS: SIGNIFICANT CHANGE UP
CULTURE RESULTS: SIGNIFICANT CHANGE UP
EGFR: 129 ML/MIN/1.73M2 — SIGNIFICANT CHANGE UP
GLUCOSE SERPL-MCNC: 141 MG/DL — HIGH (ref 70–99)
HCT VFR BLD CALC: 36 % — LOW (ref 39–50)
HGB BLD-MCNC: 11.4 G/DL — LOW (ref 13–17)
MAGNESIUM SERPL-MCNC: 1.9 MG/DL — SIGNIFICANT CHANGE UP (ref 1.6–2.6)
MCHC RBC-ENTMCNC: 26.6 PG — LOW (ref 27–34)
MCHC RBC-ENTMCNC: 31.7 GM/DL — LOW (ref 32–36)
MCV RBC AUTO: 83.9 FL — SIGNIFICANT CHANGE UP (ref 80–100)
NRBC # BLD: 0 /100 WBCS — SIGNIFICANT CHANGE UP (ref 0–0)
PHOSPHATE SERPL-MCNC: 2.7 MG/DL — SIGNIFICANT CHANGE UP (ref 2.5–4.5)
PLATELET # BLD AUTO: 338 K/UL — SIGNIFICANT CHANGE UP (ref 150–400)
POTASSIUM SERPL-MCNC: 3.6 MMOL/L — SIGNIFICANT CHANGE UP (ref 3.5–5.3)
POTASSIUM SERPL-SCNC: 3.6 MMOL/L — SIGNIFICANT CHANGE UP (ref 3.5–5.3)
RBC # BLD: 4.29 M/UL — SIGNIFICANT CHANGE UP (ref 4.2–5.8)
RBC # FLD: 12.9 % — SIGNIFICANT CHANGE UP (ref 10.3–14.5)
SODIUM SERPL-SCNC: 137 MMOL/L — SIGNIFICANT CHANGE UP (ref 135–145)
SPECIMEN SOURCE: SIGNIFICANT CHANGE UP
SPECIMEN SOURCE: SIGNIFICANT CHANGE UP
WBC # BLD: 14.11 K/UL — HIGH (ref 3.8–10.5)
WBC # FLD AUTO: 14.11 K/UL — HIGH (ref 3.8–10.5)

## 2024-01-27 PROCEDURE — 85025 COMPLETE CBC W/AUTO DIFF WBC: CPT

## 2024-01-27 PROCEDURE — 82962 GLUCOSE BLOOD TEST: CPT

## 2024-01-27 PROCEDURE — 87637 SARSCOV2&INF A&B&RSV AMP PRB: CPT

## 2024-01-27 PROCEDURE — 86850 RBC ANTIBODY SCREEN: CPT

## 2024-01-27 PROCEDURE — 82248 BILIRUBIN DIRECT: CPT

## 2024-01-27 PROCEDURE — C8929: CPT

## 2024-01-27 PROCEDURE — 81001 URINALYSIS AUTO W/SCOPE: CPT

## 2024-01-27 PROCEDURE — 96375 TX/PRO/DX INJ NEW DRUG ADDON: CPT

## 2024-01-27 PROCEDURE — 74177 CT ABD & PELVIS W/CONTRAST: CPT | Mod: MA

## 2024-01-27 PROCEDURE — C1769: CPT

## 2024-01-27 PROCEDURE — 76000 FLUOROSCOPY <1 HR PHYS/QHP: CPT

## 2024-01-27 PROCEDURE — 86803 HEPATITIS C AB TEST: CPT

## 2024-01-27 PROCEDURE — 71045 X-RAY EXAM CHEST 1 VIEW: CPT

## 2024-01-27 PROCEDURE — C1880: CPT

## 2024-01-27 PROCEDURE — 86705 HEP B CORE ANTIBODY IGM: CPT

## 2024-01-27 PROCEDURE — 85610 PROTHROMBIN TIME: CPT

## 2024-01-27 PROCEDURE — 96365 THER/PROPH/DIAG IV INF INIT: CPT

## 2024-01-27 PROCEDURE — 83735 ASSAY OF MAGNESIUM: CPT

## 2024-01-27 PROCEDURE — 80053 COMPREHEN METABOLIC PANEL: CPT

## 2024-01-27 PROCEDURE — 83690 ASSAY OF LIPASE: CPT

## 2024-01-27 PROCEDURE — 83880 ASSAY OF NATRIURETIC PEPTIDE: CPT

## 2024-01-27 PROCEDURE — 87389 HIV-1 AG W/HIV-1&-2 AB AG IA: CPT

## 2024-01-27 PROCEDURE — 86704 HEP B CORE ANTIBODY TOTAL: CPT

## 2024-01-27 PROCEDURE — 86706 HEP B SURFACE ANTIBODY: CPT

## 2024-01-27 PROCEDURE — 80048 BASIC METABOLIC PNL TOTAL CA: CPT

## 2024-01-27 PROCEDURE — 86900 BLOOD TYPING SEROLOGIC ABO: CPT

## 2024-01-27 PROCEDURE — S2900: CPT

## 2024-01-27 PROCEDURE — 88304 TISSUE EXAM BY PATHOLOGIST: CPT

## 2024-01-27 PROCEDURE — 99285 EMERGENCY DEPT VISIT HI MDM: CPT

## 2024-01-27 PROCEDURE — 96366 THER/PROPH/DIAG IV INF ADDON: CPT

## 2024-01-27 PROCEDURE — 83036 HEMOGLOBIN GLYCOSYLATED A1C: CPT

## 2024-01-27 PROCEDURE — 87340 HEPATITIS B SURFACE AG IA: CPT

## 2024-01-27 PROCEDURE — 87040 BLOOD CULTURE FOR BACTERIA: CPT

## 2024-01-27 PROCEDURE — 85520 HEPARIN ASSAY: CPT

## 2024-01-27 PROCEDURE — 86709 HEPATITIS A IGM ANTIBODY: CPT

## 2024-01-27 PROCEDURE — 99232 SBSQ HOSP IP/OBS MODERATE 35: CPT

## 2024-01-27 PROCEDURE — C1889: CPT

## 2024-01-27 PROCEDURE — 80076 HEPATIC FUNCTION PANEL: CPT

## 2024-01-27 PROCEDURE — 84484 ASSAY OF TROPONIN QUANT: CPT

## 2024-01-27 PROCEDURE — 83605 ASSAY OF LACTIC ACID: CPT

## 2024-01-27 PROCEDURE — 85027 COMPLETE CBC AUTOMATED: CPT

## 2024-01-27 PROCEDURE — 84100 ASSAY OF PHOSPHORUS: CPT

## 2024-01-27 PROCEDURE — 85730 THROMBOPLASTIN TIME PARTIAL: CPT

## 2024-01-27 PROCEDURE — 36415 COLL VENOUS BLD VENIPUNCTURE: CPT

## 2024-01-27 PROCEDURE — 86901 BLOOD TYPING SEROLOGIC RH(D): CPT

## 2024-01-27 PROCEDURE — 97161 PT EVAL LOW COMPLEX 20 MIN: CPT

## 2024-01-27 RX ORDER — OXYCODONE HYDROCHLORIDE 5 MG/1
1 TABLET ORAL
Qty: 10 | Refills: 0
Start: 2024-01-27

## 2024-01-27 RX ADMIN — PANTOPRAZOLE SODIUM 40 MILLIGRAM(S): 20 TABLET, DELAYED RELEASE ORAL at 13:31

## 2024-01-27 RX ADMIN — METFORMIN HYDROCHLORIDE 500 MILLIGRAM(S): 850 TABLET ORAL at 13:36

## 2024-01-27 RX ADMIN — LINAGLIPTIN 5 MILLIGRAM(S): 5 TABLET, FILM COATED ORAL at 10:49

## 2024-01-27 RX ADMIN — Medication 1000 MILLIGRAM(S): at 00:35

## 2024-01-27 RX ADMIN — APIXABAN 5 MILLIGRAM(S): 2.5 TABLET, FILM COATED ORAL at 05:30

## 2024-01-27 RX ADMIN — Medication 1000 MILLIGRAM(S): at 13:45

## 2024-01-27 RX ADMIN — EMTRICITABINE AND TENOFOVIR DISOPROXIL FUMARATE 1 TABLET(S): 200; 300 TABLET, FILM COATED ORAL at 13:48

## 2024-01-27 RX ADMIN — PIPERACILLIN AND TAZOBACTAM 25 GRAM(S): 4; .5 INJECTION, POWDER, LYOPHILIZED, FOR SOLUTION INTRAVENOUS at 10:48

## 2024-01-27 RX ADMIN — Medication 1000 MILLIGRAM(S): at 14:15

## 2024-01-27 NOTE — PROGRESS NOTE ADULT - SUBJECTIVE AND OBJECTIVE BOX
Patient is a 38y old  Male who presents with a chief complaint of perforated appendicitis (26 Jan 2024 21:09)    INTERVAL EVENTS:    SUBJECTIVE:  Patient was seen and examined at bedside. Reports feeling fine, denies any complaints. Awaiting discharge today. AC resumed. No other complaints or events reported.    Review of systems: No fever, chills, dizziness, HA, Changes in vision, CP, dyspnea, nausea or vomiting, dysuria, changes in bowel movements, LE edema. Rest of 12 point Review of systems negative unless otherwise documented elsewhere in note.     Diet, Regular:   Consistent Carbohydrate Evening Snack (CSTCHOSN) (01-26-24 @ 09:08) [Active]      MEDICATIONS:  MEDICATIONS  (STANDING):  acetaminophen     Tablet .. 1000 milliGRAM(s) Oral every 6 hours  apixaban 5 milliGRAM(s) Oral every 12 hours  dextrose 5%. 1000 milliLiter(s) (50 mL/Hr) IV Continuous <Continuous>  dextrose 5%. 1000 milliLiter(s) (100 mL/Hr) IV Continuous <Continuous>  dextrose 50% Injectable 25 Gram(s) IV Push once  dextrose 50% Injectable 12.5 Gram(s) IV Push once  dextrose 50% Injectable 25 Gram(s) IV Push once  emtricitabine 200 mG/tenofovir 300 mG (TRUVADA) 1 Tablet(s) Oral daily  glucagon  Injectable 1 milliGRAM(s) IntraMuscular once  insulin lispro (ADMELOG) corrective regimen sliding scale   SubCutaneous Before meals and at bedtime  linagliptin 5 milliGRAM(s) Oral every 24 hours  metFORMIN 500 milliGRAM(s) Oral two times a day with meals  pantoprazole  Injectable 40 milliGRAM(s) IV Push daily  piperacillin/tazobactam IVPB.. 3.375 Gram(s) IV Intermittent every 8 hours    MEDICATIONS  (PRN):  benzocaine/menthol Lozenge 1 Lozenge Oral every 2 hours PRN Sore Throat  dextrose Oral Gel 15 Gram(s) Oral once PRN Blood Glucose LESS THAN 70 milliGRAM(s)/deciliter  ondansetron Injectable 4 milliGRAM(s) IV Push every 6 hours PRN Nausea and/or Vomiting  oxyCODONE    IR 5 milliGRAM(s) Oral every 6 hours PRN Moderate Pain (4 - 6)  oxyCODONE    IR 10 milliGRAM(s) Oral every 6 hours PRN Severe Pain (7 - 10)      Allergies    shellfish (Unknown)  No Known Drug Allergies    Intolerances        OBJECTIVE:  Vital Signs Last 24 Hrs  T(C): 36.6 (27 Jan 2024 10:40), Max: 37.1 (26 Jan 2024 22:16)  T(F): 97.9 (27 Jan 2024 10:40), Max: 98.7 (26 Jan 2024 22:16)  HR: 84 (27 Jan 2024 11:15) (72 - 96)  BP: 137/67 (27 Jan 2024 11:15) (121/72 - 137/67)  BP(mean): 95 (27 Jan 2024 11:15) (86 - 98)  RR: 18 (27 Jan 2024 11:15) (17 - 22)  SpO2: 98% (27 Jan 2024 11:15) (96% - 99%)    Parameters below as of 27 Jan 2024 11:15  Patient On (Oxygen Delivery Method): room air      I&O's Summary    26 Jan 2024 07:01  -  27 Jan 2024 07:00  --------------------------------------------------------  IN: 75 mL / OUT: 1650 mL / NET: -1575 mL    27 Jan 2024 07:01  -  27 Jan 2024 12:23  --------------------------------------------------------  IN: 480 mL / OUT: 400 mL / NET: 80 mL        PHYSICAL EXAM:  General: AOX3, NAD, sitting in chair, speaking in full sentences, no labored breathing on RA  HEENT: AT/NC, no facial asymmetry  Lungs: good air entry, no crackles, no wheezes  Heart: RRR  Abdomen: soft, non-tender  Extremities:  no edema, no tenderness, no focal deficit     LABS:                        11.4   14.11 )-----------( 338      ( 27 Jan 2024 05:30 )             36.0     01-27    137  |  100  |  5<L>  ----------------------------<  141<H>  3.6   |  21<L>  |  0.55    Ca    8.8      27 Jan 2024 05:30  Phos  2.7     01-27  Mg     1.9     01-27        PT/INR - ( 26 Jan 2024 05:30 )   PT: 12.1 sec;   INR: 1.06          PTT - ( 26 Jan 2024 05:30 )  PTT:30.5 sec  CAPILLARY BLOOD GLUCOSE      POCT Blood Glucose.: 148 mg/dL (26 Jan 2024 15:53)    Urinalysis Basic - ( 27 Jan 2024 05:30 )    Color: x / Appearance: x / SG: x / pH: x  Gluc: 141 mg/dL / Ketone: x  / Bili: x / Urobili: x   Blood: x / Protein: x / Nitrite: x   Leuk Esterase: x / RBC: x / WBC x   Sq Epi: x / Non Sq Epi: x / Bacteria: x        MICRODATA:    Culture - Blood (collected 24 Jan 2024 21:26)  Source: .Blood Blood-Peripheral  Preliminary Report (26 Jan 2024 23:00):    No growth at 2 days.        RADIOLOGY/OTHER STUDIES:

## 2024-01-27 NOTE — PROGRESS NOTE ADULT - ASSESSMENT
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 Admitted for perforated appendicitis with appendicolith, sp robotic assisted appendectomy and IVC filter placement on 1/23     Problem/Plan - 1:  ·  Problem: Gram-negative sepsis, unspecified.   ·  Plan: - Perforated appendicitis with appendicolith, sp robotic assisted appendectomy and IVC filter placement on 1/23  - Spiked a high grade post op fever of 104 on 1/23 @17:00. Temp 102.3 on 1/24 @ 18:00. Blood cultures 1/22, 1/23, 1/24 NGTD.   - No fever in >24hr, wbc trending down   - Follow blood cx  - ATB on DC pre primary team, patient on [facility] inpatient      Problem/Plan - 2:  ·  Problem: Perforated appendicitis.   - s/p appendectomy 1/23  - Management as per primary team.     Problem/Plan - 3:  ·  Problem: Diabetes mellitus with hyperglycemia.   ·  Plan: - A1C: 9.5, Endo recs appreciated- start metformin 500mg with breakfast and dinner and tradjenta 5mg before breakfast.  - c/w ISS, monitor fs.     Problem/Plan - 4:  ·  Problem: Factor V Leiden.   ·  Plan: - On eliquis at home.   - Temp IVC filter placed 1/23. Follow up outpatient for removal   - Apixaban resumed, follow-up with Hematology      Problem/Plan - 5:  ·  Problem: Acute blood loss anemia.   ·  Plan: -Drop in h/h from 14.5 on admission to 11  -Continue to monitor H/H daily   - No signs of active bleeding   - Active type and screen.     Problem/Plan - 6:  ·  Problem: On pre-exposure prophylaxis for HIV.   ·  Plan: - continue Truvada.    Patient to arrange for follow-up with PCP   Discussed with primary team

## 2024-01-27 NOTE — PROGRESS NOTE ADULT - REASON FOR ADMISSION
perforated appendicitis

## 2024-01-27 NOTE — DISCHARGE NOTE NURSING/CASE MANAGEMENT/SOCIAL WORK - PATIENT PORTAL LINK FT
You can access the FollowMyHealth Patient Portal offered by [facility] by registering at the following website: [email or web address]. By joining [facility]’s FollowMyHealth portal, you will also be able to view your health information using other applications (apps) compatible with our system.

## 2024-01-27 NOTE — PROGRESS NOTE ADULT - PROVIDER SPECIALTY LIST ADULT
Internal Medicine
SICU
SICU
Surgery
Surgery
Vascular Surgery
Surgery
Vascular Surgery
Endocrinology
SICU
Internal Medicine

## 2024-01-27 NOTE — DISCHARGE NOTE NURSING/CASE MANAGEMENT/SOCIAL WORK - NSDCPEFALRISK_GEN_ALL_CORE
For information on Fall & Injury Prevention, visit: [email or web address] OR  [email or web address] OR  https://www.cdc.gov/steadi/patient.html

## 2024-01-28 LAB
CULTURE RESULTS: SIGNIFICANT CHANGE UP
CULTURE RESULTS: SIGNIFICANT CHANGE UP
SPECIMEN SOURCE: SIGNIFICANT CHANGE UP
SPECIMEN SOURCE: SIGNIFICANT CHANGE UP

## 2024-01-29 LAB
CULTURE RESULTS: SIGNIFICANT CHANGE UP
SPECIMEN SOURCE: SIGNIFICANT CHANGE UP

## 2024-01-31 DIAGNOSIS — A41.50 GRAM-NEGATIVE SEPSIS, UNSPECIFIED: ICD-10-CM

## 2024-01-31 DIAGNOSIS — K35.33 ACUTE APPENDICITIS WITH PERFORATION, LOCALIZED PERITONITIS, AND GANGRENE, WITH ABSCESS: ICD-10-CM

## 2024-01-31 DIAGNOSIS — E66.01 MORBID (SEVERE) OBESITY DUE TO EXCESS CALORIES: ICD-10-CM

## 2024-01-31 DIAGNOSIS — D68.51 ACTIVATED PROTEIN C RESISTANCE: ICD-10-CM

## 2024-01-31 DIAGNOSIS — D62 ACUTE POSTHEMORRHAGIC ANEMIA: ICD-10-CM

## 2024-01-31 DIAGNOSIS — E11.65 TYPE 2 DIABETES MELLITUS WITH HYPERGLYCEMIA: ICD-10-CM

## 2024-01-31 LAB — SURGICAL PATHOLOGY STUDY: SIGNIFICANT CHANGE UP

## 2024-02-02 ENCOUNTER — APPOINTMENT (OUTPATIENT)
Dept: SURGERY | Facility: CLINIC | Age: 39
End: 2024-02-02
Payer: COMMERCIAL

## 2024-02-02 VITALS
BODY MASS INDEX: 40.63 KG/M2 | SYSTOLIC BLOOD PRESSURE: 116 MMHG | TEMPERATURE: 97.5 F | DIASTOLIC BLOOD PRESSURE: 77 MMHG | HEART RATE: 83 BPM | WEIGHT: 300 LBS | OXYGEN SATURATION: 97 % | HEIGHT: 72 IN

## 2024-02-02 DIAGNOSIS — Z83.2 FAMILY HISTORY OF DISEASES OF THE BLOOD AND BLOOD-FORMING ORGANS AND CERTAIN DISORDERS INVOLVING THE IMMUNE MECHANISM: ICD-10-CM

## 2024-02-02 DIAGNOSIS — Z78.9 OTHER SPECIFIED HEALTH STATUS: ICD-10-CM

## 2024-02-02 PROCEDURE — 99024 POSTOP FOLLOW-UP VISIT: CPT

## 2024-02-05 ENCOUNTER — TRANSCRIPTION ENCOUNTER (OUTPATIENT)
Age: 39
End: 2024-02-05

## 2024-02-05 PROBLEM — Z83.2 FAMILY HISTORY OF FACTOR V DEFICIENCY: Status: ACTIVE | Noted: 2024-02-02

## 2024-02-05 PROBLEM — Z78.9 SOCIAL ALCOHOL USE: Status: ACTIVE | Noted: 2024-02-02

## 2024-02-05 NOTE — PLAN
[FreeTextEntry1] : Ethel BURNS PA-C, am scribing for and the presence of Dr.Robert Akins the following sections HISTORY OF PRESENT ILLNESSS, PAST MEDICAL/FAMILY/SOCIAL HISTORY; REVIEW OF SYSTEMS; VITAL SIGNS; PHYSICAL EXAM; DISPOSITION.

## 2024-02-05 NOTE — DATA REVIEWED
[FreeTextEntry1] : Surgical Pathology Report - Auth (Verified)  Specimen(s) Submitted 1  Rupture Appendix  Final Diagnosis Appendix, appendectomy: -   Marked acute appendicitis with transmural inflammation and gangrenous necrosis -   Marked acute serositis

## 2024-02-05 NOTE — ASSESSMENT
[FreeTextEntry1] : 37 y/o male with pmhx of Factor V Leiden, h/o PE x 2 thrombectomy (2020), on Eliquis, recently admitted to [facility] for acute appendicitis. Patient is now s/p Robotic assisted appendectomy and placement of IVC filter (by vascular surgery) on 1/23/24. Patient seems to be doing well post operatively and recovering as expected. Discussed gradual return to normal activity and natural scar maturation. Discussed follow with vascular for IVC filter, appt set for 2/15/24. He may RTC as needed. All questions answered.   Plan: -F/U vascular (Appt 2/15/24) -RTC as needed

## 2024-02-05 NOTE — PHYSICAL EXAM
[Abdominal Masses] : No abdominal masses [Abdomen Tenderness] : ~T ~M No abdominal tenderness [Tender] : was nontender [Enlarged] : not enlarged [Oriented to Person] : oriented to person [Oriented to Place] : oriented to place [Oriented to Time] : oriented to time [Calm] : calm [de-identified] : NAD, comfortable [de-identified] : Normocephalic, atraumatic. No scleral icterus.  [de-identified] : Supple, no JVD or cervical lymphadenopathy.  [de-identified] : No respiratory distress  [de-identified] : +BS soft, nontender, nondistended.  Incisions healing well, c/d/i. Some minor ecchymosis noted.    [de-identified] : Warm, dry.

## 2024-02-05 NOTE — HISTORY OF PRESENT ILLNESS
[de-identified] : This is a 39 y/o male with pmhx of Factor V Leiden, h/o PE x 2 thrombectomy (2020), on Eliquis, recently admitted to [facility] for acute appendicitis. Patient is now s/p Robotic assisted appendectomy and placement of IVC filter (by vascular surgery) on 1/23/24. Patient presented to the office feeling overall well for a routine post operative visit. He states he is eating and drinking as usual. He reports he is having daily bowel movements and voiding as usual. He is ambulating often with no issues. He denies any fever, chills, sob, chest keyla, calf pain, n/v/c.

## 2024-02-06 ENCOUNTER — APPOINTMENT (OUTPATIENT)
Dept: INTERNAL MEDICINE | Facility: CLINIC | Age: 39
End: 2024-02-06
Payer: COMMERCIAL

## 2024-02-06 VITALS
RESPIRATION RATE: 16 BRPM | DIASTOLIC BLOOD PRESSURE: 82 MMHG | OXYGEN SATURATION: 97 % | SYSTOLIC BLOOD PRESSURE: 130 MMHG | TEMPERATURE: 98 F | HEART RATE: 85 BPM

## 2024-02-06 PROCEDURE — 99214 OFFICE O/P EST MOD 30 MIN: CPT | Mod: GC

## 2024-02-06 NOTE — HISTORY OF PRESENT ILLNESS
[Post-hospitalization from ___ Hospital] : Post-hospitalization from [unfilled] Hospital [Admitted on: ___] : The patient was admitted on [unfilled] [Discharged on ___] : discharged on [unfilled] [FreeTextEntry2] : 38 y.o M w/ PMHx of DM, factor V leiden, PE x2 s/p thrombectomy 12/2020 on Eliquis with recent admission to [facility] due to perforated/gangrenous appendix (1/22 - 1/27) s/p robotic surgical intervention presents to the clinic to establish primary care as concerned for the newly diagnosed DM.  Prior to the recent admission pt claims to have been in good general state of health with no recent ED/urgent care visit. Mood has been at baseline and pt has been UTD with vaccinations. ROS otherwise negative on presentation.

## 2024-02-06 NOTE — ASSESSMENT
[FreeTextEntry1] : 38 y.o M w/ PMHx of DM, factor V leiden, PE x2 s/p thrombectomy 12/2020 on Eliquis with recent admission to [facility] due to perforated/gangrenous appendix (1/22 - 1/27) s/p robotic surgical intervention presents to the clinic to establish primary care as concerned for the newly diagnosed DM.   # New Type 2 DM Pt with newly diagnosed T2DM. Most recent A1C at [facility] 9.5. Pt prescribed, Trizepatide on discharge but no other medication.  - Start metformin 500mg qd  - c/w Trizepatide with follow up for dose titration - f/u urine alb/cr ratio, consider ACEI/ARB during next visit for renal protection - Consider SGLT2 during next visit given helps with weight loss and pt at-risk for HF - Obtain lipid profile during next visit and f/u ASCVD need for statins - pt educated on lifestyle modification and exercise   # Factor V leiden Hx of PE in past x2, has appointment to follow up with vascular given has IVC filter  - f/u with vascular - c/w eliquis 5mg BID  # HCM Pt UTD with vaccination and screenings  Dispo: 2 weeks for BP, FSG monitoring

## 2024-02-06 NOTE — END OF VISIT
[] : Resident [FreeTextEntry3] : New diabetic discovered at the time of complicated appendicitis surgery. Plan as per resident. History of Factor V Leiden gene mutation and S/P PE followed by Pulmonary.

## 2024-02-07 LAB
CREAT SPEC-SCNC: 157 MG/DL
MICROALBUMIN 24H UR DL<=1MG/L-MCNC: 3.7 MG/DL
MICROALBUMIN/CREAT 24H UR-RTO: 24 MG/G

## 2024-02-08 ENCOUNTER — NON-APPOINTMENT (OUTPATIENT)
Age: 39
End: 2024-02-08

## 2024-02-08 ENCOUNTER — APPOINTMENT (OUTPATIENT)
Dept: SURGERY | Facility: CLINIC | Age: 39
End: 2024-02-08
Payer: COMMERCIAL

## 2024-02-08 VITALS
OXYGEN SATURATION: 98 % | SYSTOLIC BLOOD PRESSURE: 125 MMHG | DIASTOLIC BLOOD PRESSURE: 80 MMHG | WEIGHT: 288 LBS | TEMPERATURE: 97.3 F | BODY MASS INDEX: 39.01 KG/M2 | HEIGHT: 72 IN | HEART RATE: 99 BPM

## 2024-02-08 PROCEDURE — 99024 POSTOP FOLLOW-UP VISIT: CPT

## 2024-02-08 RX ORDER — CEPHALEXIN 500 MG/1
500 TABLET ORAL
Qty: 10 | Refills: 0 | Status: ACTIVE | COMMUNITY
Start: 2024-02-08 | End: 1900-01-01

## 2024-02-08 NOTE — ASSESSMENT
[FreeTextEntry1] : 39 y/o male with pmhx of Factor V Leiden, h/o PE x 2 thrombectomy (2020), on Eliis, recently admitted to [facility] for acute appendicitis. Patient is now s/p Robotic assisted appendectomy and placement of IVC filter (by vascular surgery) on 1/23/24. Pt has seroma noted at the umbilicus incision. Drainage occurs with pressure applied from end of incision. Area cleaned with saline/peroxide. Gauze and bandage placed. Vitals wnl. Provided patient with abdominal binder, instructed to wear daily to keep pressure on area. Discussed with patient to expect some drainage, however, to call office if with any new/worsening symptoms or if drainage significantly increases. Prescription sent to pharmacy, Cephalexin 500mg oral table BID x 5 days. Pt will RTC early next week. Knows to call with any questions/concerns.   Plan: -Seroma drainage, keep area clean/dry  -Wear abdominal binder daily  -Start abx -RTC early next week  -Call office with any new/worsening symptoms

## 2024-02-08 NOTE — HISTORY OF PRESENT ILLNESS
[de-identified] : This is a 39 y/o male with pmhx of Factor V Leiden, h/o PE x 2 thrombectomy (2020), on Eliquis, recently admitted to [facility] for acute appendicitis. Patient is now s/p Robotic assisted appendectomy and placement of IVC filter (by vascular surgery) on 1/23/24. Patient presented to the office feeling overall well for a routine post operative visit. He states he is eating and drinking as usual. He reports he is having daily bowel movements and voiding as usual. He is ambulating often with no issues. He denies any fever, chills, sob, chest keyla, calf pain, n/v/c.  [de-identified] : 2-8-24: Patient seen in office today. Pt reports he was bending over to picking something off the ground and afterwards noted bleeding from the incision site. He reports the drainage has improved, however still occurs when he moves around or bends. He did not have any pain or discomfort. He takes Eliquis daily, took this AM. He denies any surrounding redness or warmth near incision.  He denies any fever, chills, chest pain, sob.

## 2024-02-08 NOTE — PHYSICAL EXAM
[Abdominal Masses] : No abdominal masses [Abdomen Tenderness] : ~T ~M No abdominal tenderness [Tender] : was nontender [Enlarged] : not enlarged [Oriented to Person] : oriented to person [Oriented to Place] : oriented to place [Oriented to Time] : oriented to time [Calm] : calm [de-identified] : NAD, comfortable [de-identified] : Normocephalic, atraumatic. No scleral icterus.  [de-identified] : Supple, no JVD or cervical lymphadenopathy.  [de-identified] : No respiratory distress  [de-identified] : +BS soft, nontender, nondistended.  Incisions healing well, c/d/i. There is a seroma noted at the umbilicus. There is serosanguinous drainage noted when pressure is applied to the area of seroma. No surrounding erythema or induration.  [de-identified] : Warm, dry.

## 2024-02-14 ENCOUNTER — APPOINTMENT (OUTPATIENT)
Dept: SURGERY | Facility: CLINIC | Age: 39
End: 2024-02-14
Payer: COMMERCIAL

## 2024-02-14 VITALS
OXYGEN SATURATION: 97 % | BODY MASS INDEX: 40.09 KG/M2 | HEART RATE: 80 BPM | WEIGHT: 296 LBS | HEIGHT: 72 IN | TEMPERATURE: 97 F | SYSTOLIC BLOOD PRESSURE: 118 MMHG | DIASTOLIC BLOOD PRESSURE: 79 MMHG

## 2024-02-14 PROCEDURE — 99024 POSTOP FOLLOW-UP VISIT: CPT

## 2024-02-14 NOTE — ASSESSMENT
[FreeTextEntry1] : 39 y/o male with pmhx of Factor V Leiden, h/o PE x 2 thrombectomy (2020), on Eliquis, recently admitted to [facility] for acute appendicitis. Patient is now s/p Robotic assisted appendectomy and placement of IVC filter (by vascular surgery) on 1/23/24. Drainage noted from seroma last week, continuing to drain with significant improvement. Vitals wnl, no fever/chills. Discussed continue to keep area clean/dry, keep pressure on area (abdominal binder). Has my direct contact to notify if he develops any new/worsening symptoms. Will RTC early next week for checkup. All questions answered.   Plan: -Abdominal binder -F/u next week for check up

## 2024-02-14 NOTE — PHYSICAL EXAM
[Abdominal Masses] : No abdominal masses [Abdomen Tenderness] : ~T ~M No abdominal tenderness [Tender] : was nontender [Enlarged] : not enlarged [Oriented to Person] : oriented to person [Oriented to Place] : oriented to place [Oriented to Time] : oriented to time [Calm] : calm [de-identified] : NAD, comfortable [de-identified] : Normocephalic, atraumatic. No scleral icterus.  [de-identified] : Supple, no JVD or cervical lymphadenopathy.  [de-identified] : No respiratory distress  [de-identified] : +BS soft, nontender, nondistended.  Incisions healing well, c/d/i. There is a seroma noted at the umbilicus, improved. There is serous drainage noted when pressure is applied to the area of seroma. No surrounding erythema or induration.  [de-identified] : Warm, dry.

## 2024-02-14 NOTE — HISTORY OF PRESENT ILLNESS
[de-identified] : This is a 39 y/o male with pmhx of Factor V Leiden, h/o PE x 2 thrombectomy (2020), on Eliquis, recently admitted to [facility] for acute appendicitis. Patient is now s/p Robotic assisted appendectomy and placement of IVC filter (by vascular surgery) on 1/23/24. Patient presented to the office feeling overall well for a routine post operative visit. He states he is eating and drinking as usual. He reports he is having daily bowel movements and voiding as usual. He is ambulating often with no issues. He denies any fever, chills, sob, chest keyla, calf pain, n/v/c.  [de-identified] : 2-8-24: Patient seen in office today. Pt reports he was bending over to picking something off the ground and afterwards noted bleeding from the incision site. He reports the drainage has improved, however still occurs when he moves around or bends. He did not have any pain or discomfort. He takes Eliquis daily, took this AM. He denies any surrounding redness or warmth near incision.  He denies any fever, chills, chest pain, sob.   2-14-24: Returns to office. Pt reports he is overall feeling well. He states he is still experiencing drainage from the incisions, however much improved since last visit. He states he has some discomfort at the incision when pressure is applied. He denies significant pain. He does not have any surrounding erythema or induration. He has finished his full course of antibiotics with no issues. He denies fever, chills, nasuea, vomiting, chest pain, sob.

## 2024-02-15 ENCOUNTER — APPOINTMENT (OUTPATIENT)
Dept: VASCULAR SURGERY | Facility: CLINIC | Age: 39
End: 2024-02-15
Payer: COMMERCIAL

## 2024-02-15 VITALS
DIASTOLIC BLOOD PRESSURE: 74 MMHG | HEIGHT: 72 IN | BODY MASS INDEX: 40.09 KG/M2 | HEART RATE: 67 BPM | WEIGHT: 296 LBS | SYSTOLIC BLOOD PRESSURE: 115 MMHG

## 2024-02-15 DIAGNOSIS — Z95.828 PRESENCE OF OTHER VASCULAR IMPLANTS AND GRAFTS: ICD-10-CM

## 2024-02-15 PROCEDURE — 99024 POSTOP FOLLOW-UP VISIT: CPT

## 2024-02-15 NOTE — ADDENDUM
[FreeTextEntry1] : This note was written by Caroline GÓMEZ, acting as a scribe for Dr. Gen Serrano.  I, Dr. Gen Serrano, have read and attest that all the information, medical decision-making, and discharge instructions within are true and accurate.  Mr. Percy Garces is a 38-year-old man with morbid obesity, Factor V Leiden deficiency, multiple episodes of deep vein thromboses and pulmonary embolism (most recently in 2020 requiring thrombectomy), who was recently admitted for acute appendicitis with perforation and underwent robotic-assisted appendectomy and inferior vena cava filter placement on 1/23/24. He recovered fine and is back on anticoagulation. He presents for his vascular postop visit. He has no major complaints, including leg pain or swelling. On exam, he appears well with palpable femoral and pedal pulses. His groin is soft and non-tender. His feet are warm with good capillary refill. He has mild leg swelling, but they are non-tender. There are no ulcers or wounds. Given his exam, resumption of anticoagulation and no more immediate plans for further surgeries, I will plan for endovascular retrieval attempt of inferior vena cava filter. Although it is a minor, ambulatory procedure, I explained there is a possibility that the filter cannot be removed safely via standard endovascular techniques, in which care we will discuss other options afterwards. My team will coordinate for the procedure to be done tentatively on Friday 2/23/24.   I, Dr. Gen Serrano, personally performed the evaluation and management (E/M) services for this new patient.  That E/M includes conducting the initial examination, assessing all conditions, and establishing the plan of care.  Today, my ACP, Caroline GÓMEZ, was here to observe my evaluation and management services for this patient to be followed going forward.

## 2024-02-15 NOTE — ASSESSMENT
[Arterial/Venous Disease] : arterial/venous disease [FreeTextEntry1] : 38yoM with Factor V Leiden, h/o PE x 2 s/p thrombectomy 12/2020, on Eliquis who recently underwent robotic appendectomy, abdominal washout, and IVCF placement. Today he returns for a follow up and to discuss IVCF removal. Patient feels well and presents no complaints at this time. On exam, he appears well with palpable femoral and pedal pulses. His groin is soft and non-tender. His feet are warm with good capillary refill. He has mild leg swelling, but they are non-tender. No skin breakdown. We discussed the procedure of IVCF removal, its RABs were discussed, all questions were answered. He was instructed to hold Eliquis 2 days prior to his procedure.

## 2024-02-15 NOTE — HISTORY OF PRESENT ILLNESS
[FreeTextEntry1] : 38yoM with Factor V Leiden, h/o PE x 2 s/p thrombectomy 12/2020, on Eliquis who was recently admitted with abdominal pain, on 1/23/24 patient underwent robotic appendectomy, abdominal washout, and IVCF placement. Today he returns for a follow up and to discuss IVCF removal. He is feeling well, denies CP, SOB, new legs pain or edema. He is compliant with Eliquis.

## 2024-02-15 NOTE — PHYSICAL EXAM
[Respiratory Effort] : normal respiratory effort [Normal Heart Sounds] : normal heart sounds [No Rash or Lesion] : No rash or lesion [Alert] : alert [Calm] : calm [2+] : left 2+ [Ankle Swelling (On Exam)] : present [Ankle Swelling Bilaterally] : bilaterally  [Ankle Swelling On The Right] : mild [Varicose Veins Of Lower Extremities] : not present [] : not present [Abdomen Tenderness] : ~T ~M No abdominal tenderness [de-identified] : WN/WD, NAD [de-identified] : NC/AT [de-identified] : supple [de-identified] : +FROM 5/5x4 [de-identified] : grossly intact

## 2024-02-20 ENCOUNTER — APPOINTMENT (OUTPATIENT)
Dept: INTERNAL MEDICINE | Facility: CLINIC | Age: 39
End: 2024-02-20
Payer: COMMERCIAL

## 2024-02-20 VITALS
TEMPERATURE: 97.5 F | OXYGEN SATURATION: 98 % | BODY MASS INDEX: 39.96 KG/M2 | HEART RATE: 71 BPM | DIASTOLIC BLOOD PRESSURE: 84 MMHG | SYSTOLIC BLOOD PRESSURE: 122 MMHG | HEIGHT: 72 IN | WEIGHT: 295 LBS

## 2024-02-20 DIAGNOSIS — S30.1XXA CONTUSION OF ABDOMINAL WALL, INITIAL ENCOUNTER: ICD-10-CM

## 2024-02-20 PROCEDURE — 36415 COLL VENOUS BLD VENIPUNCTURE: CPT

## 2024-02-20 PROCEDURE — 99214 OFFICE O/P EST MOD 30 MIN: CPT | Mod: GC,25

## 2024-02-20 NOTE — PHYSICAL EXAM
[No Acute Distress] : no acute distress [Well-Appearing] : well-appearing [Normal Sclera/Conjunctiva] : normal sclera/conjunctiva [PERRL] : pupils equal round and reactive to light [EOMI] : extraocular movements intact [Normal Outer Ear/Nose] : the outer ears and nose were normal in appearance [No JVD] : no jugular venous distention [No Lymphadenopathy] : no lymphadenopathy [No Respiratory Distress] : no respiratory distress  [No Accessory Muscle Use] : no accessory muscle use [Clear to Auscultation] : lungs were clear to auscultation bilaterally [Normal Rate] : normal rate  [Normal S1, S2] : normal S1 and S2 [No Edema] : there was no peripheral edema [Soft] : abdomen soft [Normal Anterior Cervical Nodes] : no anterior cervical lymphadenopathy [No CVA Tenderness] : no CVA  tenderness [No Spinal Tenderness] : no spinal tenderness [No Joint Swelling] : no joint swelling [No Rash] : no rash [No Focal Deficits] : no focal deficits [Normal Gait] : normal gait [Normal Affect] : the affect was normal [Normal Insight/Judgement] : insight and judgment were intact [de-identified] : Abdominal seroma clean dry and intact

## 2024-02-20 NOTE — HISTORY OF PRESENT ILLNESS
[FreeTextEntry1] : Pt. stated he is here for a follow up. [de-identified] : Pt is a 39 yo M with PMHx of DM, factor 5 sammy, PE x 2, (most recent in dec 2020) on Eliquis and recent admission for appendicitis now s/p appendectomy. During recent hospitalization Eliquis held and IVC filter placed (1/23/24). Pt following with vascular surgeon who plans to remove on 2/23/24 as pt is back on Eliquis and no future surgeries planned. Pt states that after he appendectomy he developed a abdominal wall seroma for which he has been following up with his surgeon. Initially with bloody drainage which he was given a abdominal binder an started on cephalexin for 7 days by his surgeon to wear, now per pt it is minimally draining clear yellow fluid. Today, pt is here for follow up regarding his evaluated glucose found during his recent admission, pt initially with n/v after starting metformin now resolved and tolerating well. Pt denies cp, sob, n/v/d/, no urinary frequency, dysuria, no blurry vision.

## 2024-02-20 NOTE — ASSESSMENT
[FreeTextEntry1] : Pt is a 37 yo M with PMHx of DM, factor 5 sammy, PE x 2, (most recent in dec 2020) on Eliquis and recent admission for appendicitis now s/p appendectomy here for f/u on his recent diagnosis of DM   #DM Pt with A1C of 9.5 during his recent admission started on metformin with initial n/v, now tolerated metformin. Pt Rx Mounjaro, however, did not start taking iso seroma, pt will start taking once he feels comfortable injecting and advised to inject in location away from seroma.  Will increase metformin to 500mg BID and repeat A1c CMP and Lipid profile, pending results will start pt on statin medication.  Plan  - c/w metformin 500mg BID - f/u CMP, Lipid profile, A1C - pending lab results will start statin as primary CVD prevention  - start Mounjaro once comfortable with injection site - Pt set to see endocrinology in March   #PE #factor 5 leiden deficiency  Pt on Eliquis follows with vascular surgeon who plans to remove IVC this week. (Placed iso recent surgery and off eliquis)   Plan  - c/w Eliquis 5mg BID  - follows with pulmonology yearly  #Abdominal seroma  Pt developed abdominal seroma post appendectomy follows with surgeon who Rx abdominal binder and Abx. On exam seroma clean dry with no signs or symptoms of infection. Pt should reframe from strenuous active for another 2 weeks. Pt plans to go back to work and requests a letter stating to avoid physical activity.  Plan  - Letter to avoid activity given.

## 2024-02-20 NOTE — END OF VISIT
[] : Resident [FreeTextEntry3] : Patient tolerating metformin 500 mg daily, Will increase to 500 mg BID for diabetes out of control. He has not ye started GLP-1 Agonist because of seroma. Will advise based on labs. Will also need a statin.

## 2024-02-20 NOTE — REVIEW OF SYSTEMS
[Abdominal Pain] : abdominal pain [Fever] : no fever [Chills] : no chills [Pain] : no pain [Earache] : no earache [Chest Pain] : no chest pain [Palpitations] : no palpitations [Lower Ext Edema] : no lower extremity edema [Orthopena] : no orthopnea [Paroxysmal Nocturnal Dyspnea] : no paroxysmal nocturnal dyspnea [Shortness Of Breath] : no shortness of breath [Wheezing] : no wheezing [Cough] : no cough [Nausea] : no nausea [Constipation] : no constipation [Diarrhea] : no diarrhea [Vomiting] : no vomiting [Melena] : no melena [Dysuria] : no dysuria [Nocturia] : no nocturia [Hematuria] : no hematuria [Frequency] : no frequency [Joint Pain] : no joint pain [Skin Rash] : no skin rash [Headache] : no headache [Insomnia] : no insomnia [Anxiety] : no anxiety [Depression] : no depression [Easy Bleeding] : no easy bleeding [FreeTextEntry7] : post-surgical pain well tolerated.

## 2024-02-21 LAB
ALBUMIN SERPL ELPH-MCNC: 4.6 G/DL
ALP BLD-CCNC: 115 U/L
ALT SERPL-CCNC: 76 U/L
ANION GAP SERPL CALC-SCNC: 13 MMOL/L
AST SERPL-CCNC: 38 U/L
BILIRUB SERPL-MCNC: 0.3 MG/DL
BUN SERPL-MCNC: 9 MG/DL
CALCIUM SERPL-MCNC: 10 MG/DL
CHLORIDE SERPL-SCNC: 103 MMOL/L
CO2 SERPL-SCNC: 24 MMOL/L
CREAT SERPL-MCNC: 0.59 MG/DL
EGFR: 127 ML/MIN/1.73M2
ESTIMATED AVERAGE GLUCOSE: 189 MG/DL
GLUCOSE SERPL-MCNC: 166 MG/DL
HBA1C MFR BLD HPLC: 8.2 %
POTASSIUM SERPL-SCNC: 4.5 MMOL/L
PROT SERPL-MCNC: 7.7 G/DL
SODIUM SERPL-SCNC: 140 MMOL/L

## 2024-02-22 ENCOUNTER — TRANSCRIPTION ENCOUNTER (OUTPATIENT)
Age: 39
End: 2024-02-22

## 2024-02-23 ENCOUNTER — RESULT REVIEW (OUTPATIENT)
Age: 39
End: 2024-02-23

## 2024-02-23 ENCOUNTER — APPOINTMENT (OUTPATIENT)
Dept: VASCULAR SURGERY | Facility: HOSPITAL | Age: 39
End: 2024-02-23

## 2024-02-23 ENCOUNTER — OUTPATIENT (OUTPATIENT)
Dept: OUTPATIENT SERVICES | Facility: HOSPITAL | Age: 39
LOS: 1 days | Discharge: HOME | End: 2024-02-23
Payer: COMMERCIAL

## 2024-02-23 VITALS
OXYGEN SATURATION: 99 % | HEART RATE: 82 BPM | WEIGHT: 304.24 LBS | HEIGHT: 72.05 IN | SYSTOLIC BLOOD PRESSURE: 115 MMHG | DIASTOLIC BLOOD PRESSURE: 71 MMHG | TEMPERATURE: 98 F | RESPIRATION RATE: 15 BRPM

## 2024-02-23 LAB
ALBUMIN SERPL ELPH-MCNC: 4.3 G/DL — SIGNIFICANT CHANGE UP (ref 3.3–5)
ALP SERPL-CCNC: 116 U/L — SIGNIFICANT CHANGE UP (ref 40–120)
ALT FLD-CCNC: 76 U/L — HIGH (ref 10–45)
ANION GAP SERPL CALC-SCNC: 12 MMOL/L — SIGNIFICANT CHANGE UP (ref 5–17)
APTT BLD: 32 SEC — SIGNIFICANT CHANGE UP (ref 24.5–35.6)
AST SERPL-CCNC: 38 U/L — SIGNIFICANT CHANGE UP (ref 10–40)
BASOPHILS # BLD AUTO: 0.02 K/UL — SIGNIFICANT CHANGE UP (ref 0–0.2)
BASOPHILS NFR BLD AUTO: 0.2 % — SIGNIFICANT CHANGE UP (ref 0–2)
BILIRUB SERPL-MCNC: 0.4 MG/DL — SIGNIFICANT CHANGE UP (ref 0.2–1.2)
BUN SERPL-MCNC: 10 MG/DL — SIGNIFICANT CHANGE UP (ref 7–23)
CALCIUM SERPL-MCNC: 9.6 MG/DL — SIGNIFICANT CHANGE UP (ref 8.4–10.5)
CHLORIDE SERPL-SCNC: 105 MMOL/L — SIGNIFICANT CHANGE UP (ref 96–108)
CO2 SERPL-SCNC: 22 MMOL/L — SIGNIFICANT CHANGE UP (ref 22–31)
CREAT SERPL-MCNC: 0.62 MG/DL — SIGNIFICANT CHANGE UP (ref 0.5–1.3)
EGFR: 125 ML/MIN/1.73M2 — SIGNIFICANT CHANGE UP
EOSINOPHIL # BLD AUTO: 0.16 K/UL — SIGNIFICANT CHANGE UP (ref 0–0.5)
EOSINOPHIL NFR BLD AUTO: 1.8 % — SIGNIFICANT CHANGE UP (ref 0–6)
GLUCOSE BLDC GLUCOMTR-MCNC: 131 MG/DL — HIGH (ref 70–99)
GLUCOSE SERPL-MCNC: 156 MG/DL — HIGH (ref 70–99)
HCT VFR BLD CALC: 42.5 % — SIGNIFICANT CHANGE UP (ref 39–50)
HGB BLD-MCNC: 13.5 G/DL — SIGNIFICANT CHANGE UP (ref 13–17)
IMM GRANULOCYTES NFR BLD AUTO: 0.3 % — SIGNIFICANT CHANGE UP (ref 0–0.9)
INR BLD: 0.97 — SIGNIFICANT CHANGE UP (ref 0.85–1.18)
LYMPHOCYTES # BLD AUTO: 2.66 K/UL — SIGNIFICANT CHANGE UP (ref 1–3.3)
LYMPHOCYTES # BLD AUTO: 30.6 % — SIGNIFICANT CHANGE UP (ref 13–44)
MCHC RBC-ENTMCNC: 26.9 PG — LOW (ref 27–34)
MCHC RBC-ENTMCNC: 31.8 GM/DL — LOW (ref 32–36)
MCV RBC AUTO: 84.7 FL — SIGNIFICANT CHANGE UP (ref 80–100)
MONOCYTES # BLD AUTO: 0.5 K/UL — SIGNIFICANT CHANGE UP (ref 0–0.9)
MONOCYTES NFR BLD AUTO: 5.7 % — SIGNIFICANT CHANGE UP (ref 2–14)
NEUTROPHILS # BLD AUTO: 5.33 K/UL — SIGNIFICANT CHANGE UP (ref 1.8–7.4)
NEUTROPHILS NFR BLD AUTO: 61.4 % — SIGNIFICANT CHANGE UP (ref 43–77)
NRBC # BLD: 0 /100 WBCS — SIGNIFICANT CHANGE UP (ref 0–0)
PLATELET # BLD AUTO: 305 K/UL — SIGNIFICANT CHANGE UP (ref 150–400)
POTASSIUM SERPL-MCNC: 4.2 MMOL/L — SIGNIFICANT CHANGE UP (ref 3.5–5.3)
POTASSIUM SERPL-SCNC: 4.2 MMOL/L — SIGNIFICANT CHANGE UP (ref 3.5–5.3)
PROT SERPL-MCNC: 7.8 G/DL — SIGNIFICANT CHANGE UP (ref 6–8.3)
PROTHROM AB SERPL-ACNC: 11.1 SEC — SIGNIFICANT CHANGE UP (ref 9.5–13)
RBC # BLD: 5.02 M/UL — SIGNIFICANT CHANGE UP (ref 4.2–5.8)
RBC # FLD: 13.2 % — SIGNIFICANT CHANGE UP (ref 10.3–14.5)
SODIUM SERPL-SCNC: 139 MMOL/L — SIGNIFICANT CHANGE UP (ref 135–145)
WBC # BLD: 8.7 K/UL — SIGNIFICANT CHANGE UP (ref 3.8–10.5)
WBC # FLD AUTO: 8.7 K/UL — SIGNIFICANT CHANGE UP (ref 3.8–10.5)

## 2024-02-23 PROCEDURE — 93005 ELECTROCARDIOGRAM TRACING: CPT

## 2024-02-23 PROCEDURE — 85610 PROTHROMBIN TIME: CPT

## 2024-02-23 PROCEDURE — 37193 REM ENDOVAS VENA CAVA FILTER: CPT

## 2024-02-23 PROCEDURE — 88300 SURGICAL PATH GROSS: CPT

## 2024-02-23 PROCEDURE — 85025 COMPLETE CBC W/AUTO DIFF WBC: CPT

## 2024-02-23 PROCEDURE — C1887: CPT

## 2024-02-23 PROCEDURE — 76000 FLUOROSCOPY <1 HR PHYS/QHP: CPT

## 2024-02-23 PROCEDURE — 88300 SURGICAL PATH GROSS: CPT | Mod: 26

## 2024-02-23 PROCEDURE — 37193 REM ENDOVAS VENA CAVA FILTER: CPT | Mod: GC

## 2024-02-23 PROCEDURE — 36415 COLL VENOUS BLD VENIPUNCTURE: CPT

## 2024-02-23 PROCEDURE — C1769: CPT

## 2024-02-23 PROCEDURE — 93010 ELECTROCARDIOGRAM REPORT: CPT

## 2024-02-23 PROCEDURE — 82962 GLUCOSE BLOOD TEST: CPT

## 2024-02-23 PROCEDURE — C1894: CPT

## 2024-02-23 PROCEDURE — C1773: CPT

## 2024-02-23 PROCEDURE — 85730 THROMBOPLASTIN TIME PARTIAL: CPT

## 2024-02-23 PROCEDURE — 80053 COMPREHEN METABOLIC PANEL: CPT

## 2024-02-23 RX ORDER — SODIUM CHLORIDE 9 MG/ML
1000 INJECTION, SOLUTION INTRAVENOUS
Refills: 0 | Status: DISCONTINUED | OUTPATIENT
Start: 2024-02-23 | End: 2024-03-08

## 2024-02-23 RX ORDER — CHLORHEXIDINE GLUCONATE 213 G/1000ML
1 SOLUTION TOPICAL ONCE
Refills: 0 | Status: DISCONTINUED | OUTPATIENT
Start: 2024-02-23 | End: 2024-03-08

## 2024-02-23 RX ORDER — ACETAMINOPHEN 500 MG
650 TABLET ORAL ONCE
Refills: 0 | Status: COMPLETED | OUTPATIENT
Start: 2024-02-23 | End: 2024-02-23

## 2024-02-23 RX ADMIN — SODIUM CHLORIDE 100 MILLILITER(S): 9 INJECTION, SOLUTION INTRAVENOUS at 11:24

## 2024-02-23 RX ADMIN — Medication 650 MILLIGRAM(S): at 11:23

## 2024-02-23 RX ADMIN — Medication 650 MILLIGRAM(S): at 11:52

## 2024-02-23 NOTE — BRIEF OPERATIVE NOTE - OPERATION/FINDINGS
Right IJ access. Filter removed. Hemostasis achieved with pressure. Monocryl to close the access site.

## 2024-02-23 NOTE — PROGRESS NOTE ADULT - SUBJECTIVE AND OBJECTIVE BOX
Vascular Surgery Post-Op Note    Procedure: IVC filter removal    Diagnosis/Indication: DVT/PE on eliquis    Surgeon: Dr. Serrano    S: Pt has no complaints. Denies CP, SOB, BECKETT, calf tenderness. Pain controlled with medication.    O:  T(C): --  T(F): --  HR: --  BP: --  RR: --  SpO2: --  Wt(kg): --                        13.5   8.70  )-----------( 305      ( 23 Feb 2024 08:15 )             42.5     02-23    139  |  105  |  10  ----------------------------<  156<H>  4.2   |  22  |  0.62    Ca    9.6      23 Feb 2024 08:20    TPro  7.8  /  Alb  4.3  /  TBili  0.4  /  DBili  x   /  AST  38  /  ALT  76<H>  /  AlkPhos  116  02-23      Gen: NAD, resting comfortably in bed  Neck: soft, minimally tender, dressing clean/dry/intact  C/V: NSR  Pulm: Nonlabored breathing, no respiratory distress  Abd: soft, NT/ND  Extrem: WWP, no calf edema, SCDs in place      A/P: 38yMale s/p above procedure  Diet: regular  IVF: none  Pain/nausea control  DVT ppx: eliquis  Dispo plan: home

## 2024-02-27 LAB — SURGICAL PATHOLOGY STUDY: SIGNIFICANT CHANGE UP

## 2024-03-01 ENCOUNTER — APPOINTMENT (OUTPATIENT)
Dept: SURGERY | Facility: CLINIC | Age: 39
End: 2024-03-01
Payer: COMMERCIAL

## 2024-03-01 VITALS
SYSTOLIC BLOOD PRESSURE: 112 MMHG | HEART RATE: 95 BPM | OXYGEN SATURATION: 99 % | HEIGHT: 72 IN | DIASTOLIC BLOOD PRESSURE: 77 MMHG | WEIGHT: 289 LBS | TEMPERATURE: 97.5 F | BODY MASS INDEX: 39.14 KG/M2

## 2024-03-01 DIAGNOSIS — Z90.49 ACQUIRED ABSENCE OF OTHER SPECIFIED PARTS OF DIGESTIVE TRACT: ICD-10-CM

## 2024-03-01 PROCEDURE — 99024 POSTOP FOLLOW-UP VISIT: CPT

## 2024-03-01 NOTE — HISTORY OF PRESENT ILLNESS
[de-identified] : This is a 39 y/o male with pmhx of Factor V Leiden, h/o PE x 2 thrombectomy (2020), on Eliquis, recently admitted to [facility] for acute appendicitis. Patient is now s/p Robotic assisted appendectomy and placement of IVC filter (by vascular surgery) on 1/23/24. Patient presented to the office feeling overall well for a routine post operative visit. He states he is eating and drinking as usual. He reports he is having daily bowel movements and voiding as usual. He is ambulating often with no issues. He denies any fever, chills, sob, chest keyla, calf pain, n/v/c.  [de-identified] : 2-8-24: Patient seen in office today. Pt reports he was bending over to picking something off the ground and afterwards noted bleeding from the incision site. He reports the drainage has improved, however still occurs when he moves around or bends. He did not have any pain or discomfort. He takes Eliquis daily, took this AM. He denies any surrounding redness or warmth near incision.  He denies any fever, chills, chest pain, sob.   2-14-24: Returns to office. Pt reports he is overall feeling well. He states he is still experiencing drainage from the incisions, however much improved since last visit. He states he has some discomfort at the incision when pressure is applied. He denies significant pain. He does not have any surrounding erythema or induration. He has finished his full course of antibiotics with no issues. He denies fever, chills, nausea, vomiting, chest pain, sob.    3-1-24: Seen today in office. Pt underwent IVC filter removal on 2/24/24 with no issues. He reports he is overall feeling well. He reports the drainage has continued to decrease since last week and has stopped completed x 3 days ago. He does not have any pain or discomfort. He has returned back to work this week. He is ambulating often. He denies any fever, chills, nausea, vomiting, chest pain, sob, constipation.

## 2024-03-01 NOTE — ASSESSMENT
[FreeTextEntry1] : 39 y/o male with pmhx of Factor V Leiden, h/o PE x 2 thrombectomy (2020), on Eliquis, recently admitted to [facility] for acute appendicitis. Patient is now s/p Robotic assisted appendectomy and placement of IVC filter (by vascular surgery) on 1/23/24. Had draining seroma, has since resolved. Doing well post operatively and recovering as expected. F/u as needed. All questions answered.   Plan: F/u as needed

## 2024-03-01 NOTE — PHYSICAL EXAM
[Oriented to Person] : oriented to person [Oriented to Place] : oriented to place [Oriented to Time] : oriented to time [Calm] : calm [Abdominal Masses] : No abdominal masses [Abdomen Tenderness] : ~T ~M No abdominal tenderness [Tender] : was nontender [Enlarged] : not enlarged [de-identified] : NAD, comfortable [de-identified] : Normocephalic, atraumatic. No scleral icterus.  [de-identified] : Supple, no JVD or cervical lymphadenopathy.  [de-identified] : No respiratory distress  [de-identified] : +BS soft, nontender, nondistended.  Incisions healing well, c/d/i. Seroma resolved.  [de-identified] : Warm, dry.

## 2024-03-02 NOTE — ED ADULT NURSE NOTE - NS PRO PASSIVE SMOKE EXP
LAST VISIT 2/15/2023.     Future Appointments   Date Time Provider Department Center   3/18/2024 12:20 PM Lexii Delatorre MD [facility]   3/21/2024 11:45 AM Caron Amezquita MD [facility]         Please review; protocol failed/no protocol.     Requested Prescriptions   Pending Prescriptions Disp Refills    lisinopril-hydroCHLOROthiazide 20-25 MG Oral Tab 90 tablet 3     Sig: Take 1 tablet by mouth daily.       Hypertension Medications Protocol Failed - 2/29/2024 12:03 PM        Failed - CMP or BMP in past 12 months        Failed - EGFRCR or GFRNAA > 50     GFR Evaluation            Passed - Last BP reading less than 140/90     BP Readings from Last 1 Encounters:   08/01/23 120/72               Passed - In person appointment or virtual visit in the past 12 mos or appointment in next 3 mos     Recent Outpatient Visits              7 months ago Myofascial low back pain    [facility] Raheem Sauceda DO    Office Visit    1 year ago Encounter for annual health examination    [facility] Caron Amezquita MD    Office Visit    1 year ago [facility]    [facility]    1 year [facility]    Office Visit    1 year ago     [facility] Radha oLpes, PT    Office Visit          Future Appointments         Provider Department Appt Notes    In 2 weeks Lexii Delatorre MD [facility] new patient consult    In 2 weeks Caron Amezquita MD [facility], [address], [place] medicare physical/last physical 2-15-23 policy informed to pt                     Recent Outpatient Visits              7 months ago Myofascial low back pain    [place]  [facility]     [facility] Visit    1 year ago Encounter for annual health examination    [facility] Caron Amezquita MD    Office Visit    1 year ago [facility]asc[facility]     [facility]    1 year ago Lumbar radiculopathy    [facility] Gian Lunsford MD    Office Visit    1 year ago     [facility] Radha Lopes, YAYO    Office Visit             Future Appointments         Provider Department Appt Notes    In 2 weeks Lexii Delatorre MD [facility] new patient consult    In 2 weeks Caron Amezquita MD [facility] medicare physical/last physical 2-15-23 policy informed to pt            No

## 2024-03-07 DIAGNOSIS — Z91.013 ALLERGY TO SEAFOOD: ICD-10-CM

## 2024-03-07 DIAGNOSIS — Z45.89 ENCOUNTER FOR ADJUSTMENT AND MANAGEMENT OF OTHER IMPLANTED DEVICES: ICD-10-CM

## 2024-03-07 DIAGNOSIS — D68.51 ACTIVATED PROTEIN C RESISTANCE: ICD-10-CM

## 2024-03-07 DIAGNOSIS — Z86.718 PERSONAL HISTORY OF OTHER VENOUS THROMBOSIS AND EMBOLISM: ICD-10-CM

## 2024-03-07 DIAGNOSIS — E66.01 MORBID (SEVERE) OBESITY DUE TO EXCESS CALORIES: ICD-10-CM

## 2024-03-27 ENCOUNTER — NON-APPOINTMENT (OUTPATIENT)
Age: 39
End: 2024-03-27

## 2024-05-03 PROBLEM — D68.51 ACTIVATED PROTEIN C RESISTANCE: Chronic | Status: ACTIVE | Noted: 2024-01-22

## 2024-05-03 PROBLEM — I26.99 OTHER PULMONARY EMBOLISM WITHOUT ACUTE COR PULMONALE: Chronic | Status: ACTIVE | Noted: 2020-12-21

## 2024-05-13 RX ORDER — METFORMIN HYDROCHLORIDE 500 MG/1
500 TABLET, COATED ORAL
Qty: 60 | Refills: 2 | Status: ACTIVE | COMMUNITY
Start: 2024-02-06 | End: 1900-01-01

## 2024-05-14 RX ORDER — APIXABAN 5 MG/1
5 TABLET, FILM COATED ORAL
Qty: 180 | Refills: 3 | Status: ACTIVE | COMMUNITY
Start: 2021-01-21 | End: 1900-01-01

## 2024-05-31 ENCOUNTER — APPOINTMENT (OUTPATIENT)
Dept: ENDOCRINOLOGY | Facility: CLINIC | Age: 39
End: 2024-05-31

## 2024-06-04 ENCOUNTER — APPOINTMENT (OUTPATIENT)
Dept: PULMONOLOGY | Facility: CLINIC | Age: 39
End: 2024-06-04

## 2024-07-02 ENCOUNTER — APPOINTMENT (OUTPATIENT)
Dept: ENDOCRINOLOGY | Facility: CLINIC | Age: 39
End: 2024-07-02
Payer: COMMERCIAL

## 2024-07-02 VITALS
BODY MASS INDEX: 41.85 KG/M2 | WEIGHT: 309 LBS | DIASTOLIC BLOOD PRESSURE: 78 MMHG | HEART RATE: 77 BPM | HEIGHT: 72 IN | SYSTOLIC BLOOD PRESSURE: 118 MMHG

## 2024-07-02 DIAGNOSIS — E11.9 TYPE 2 DIABETES MELLITUS W/OUT COMPLICATIONS: ICD-10-CM

## 2024-07-02 LAB
GLUCOSE BLDC GLUCOMTR-MCNC: 133
HBA1C MFR BLD HPLC: 7.8

## 2024-07-02 PROCEDURE — 99214 OFFICE O/P EST MOD 30 MIN: CPT | Mod: 25

## 2024-07-02 PROCEDURE — 83036 HEMOGLOBIN GLYCOSYLATED A1C: CPT | Mod: QW

## 2024-07-02 PROCEDURE — 82962 GLUCOSE BLOOD TEST: CPT

## 2024-07-02 PROCEDURE — 99204 OFFICE O/P NEW MOD 45 MIN: CPT | Mod: 25

## 2024-07-02 RX ORDER — TIRZEPATIDE 5 MG/.5ML
5 INJECTION, SOLUTION SUBCUTANEOUS
Qty: 6 | Refills: 1 | Status: ACTIVE | COMMUNITY
Start: 2024-07-02 | End: 1900-01-01

## 2024-07-09 ENCOUNTER — APPOINTMENT (OUTPATIENT)
Dept: PULMONOLOGY | Facility: CLINIC | Age: 39
End: 2024-07-09
Payer: COMMERCIAL

## 2024-07-09 VITALS
OXYGEN SATURATION: 98 % | DIASTOLIC BLOOD PRESSURE: 70 MMHG | HEART RATE: 77 BPM | BODY MASS INDEX: 41.58 KG/M2 | HEIGHT: 72 IN | WEIGHT: 307 LBS | RESPIRATION RATE: 16 BRPM | TEMPERATURE: 98 F | SYSTOLIC BLOOD PRESSURE: 102 MMHG

## 2024-07-09 DIAGNOSIS — I82.413 ACUTE EMBOLISM AND THROMBOSIS OF FEMORAL VEIN, BILATERAL: ICD-10-CM

## 2024-07-09 DIAGNOSIS — I27.20 PULMONARY HYPERTENSION, UNSPECIFIED: ICD-10-CM

## 2024-07-09 DIAGNOSIS — I26.02 SADDLE EMBOLUS OF PULMONARY ARTERY WITH ACUTE COR PULMONALE: ICD-10-CM

## 2024-07-09 DIAGNOSIS — I50.811 ACUTE RIGHT HEART FAILURE: ICD-10-CM

## 2024-07-09 DIAGNOSIS — R06.83 SNORING: ICD-10-CM

## 2024-07-09 PROCEDURE — 99214 OFFICE O/P EST MOD 30 MIN: CPT

## 2024-07-09 PROCEDURE — G2211 COMPLEX E/M VISIT ADD ON: CPT | Mod: NC

## 2024-07-11 ENCOUNTER — APPOINTMENT (OUTPATIENT)
Dept: VASCULAR SURGERY | Facility: CLINIC | Age: 39
End: 2024-07-11
Payer: COMMERCIAL

## 2024-07-11 DIAGNOSIS — Z86.718 PERSONAL HISTORY OF OTHER VENOUS THROMBOSIS AND EMBOLISM: ICD-10-CM

## 2024-07-11 PROCEDURE — 99214 OFFICE O/P EST MOD 30 MIN: CPT

## 2024-07-18 ENCOUNTER — RESULT REVIEW (OUTPATIENT)
Age: 39
End: 2024-07-18

## 2024-07-23 ENCOUNTER — NON-APPOINTMENT (OUTPATIENT)
Age: 39
End: 2024-07-23

## 2024-08-05 ENCOUNTER — RESULT REVIEW (OUTPATIENT)
Age: 39
End: 2024-08-05

## 2024-08-06 ENCOUNTER — RESULT REVIEW (OUTPATIENT)
Age: 39
End: 2024-08-06

## 2024-08-13 ENCOUNTER — APPOINTMENT (OUTPATIENT)
Dept: INTERNAL MEDICINE | Facility: CLINIC | Age: 39
End: 2024-08-13
Payer: COMMERCIAL

## 2024-08-13 DIAGNOSIS — T75.3XXA MOTION SICKNESS, INITIAL ENCOUNTER: ICD-10-CM

## 2024-08-13 PROCEDURE — G2211 COMPLEX E/M VISIT ADD ON: CPT | Mod: NC

## 2024-08-13 PROCEDURE — 99213 OFFICE O/P EST LOW 20 MIN: CPT | Mod: GC

## 2024-08-13 NOTE — END OF VISIT
[] : Resident [FreeTextEntry3] : Patient has motion sickness and in the past was not responsive to scopolamine. We advised that he try Dramamine.

## 2024-08-13 NOTE — ASSESSMENT
[FreeTextEntry1] : 39yoM with Factor V Leiden, h/o PE x 2 s/p thrombectomy 12/2020, on Eliquis (lifelong AC), DM diagnosed in Jan 2024 presents for Telemedicine appointment with regards to motion sickness medications.

## 2024-08-13 NOTE — HISTORY OF PRESENT ILLNESS
[Home] : at home, [unfilled] , at the time of the visit. [Medical Office: ([facility])___] : at the medical office located in  [Verbal consent obtained from patient] : the patient, [unfilled] [FreeTextEntry1] : Medication request [de-identified] : 39yoM with Factor V Leiden, h/o PE x 2 s/p thrombectomy 12/2020, on Eliquis (lifelong AC), DM diagnosed in Jan 2024 presents for Telemedicine appointment with regards to motion sickness medications. Patient will be on a cruise in the next few weeks. On his last cruise, patient experienced severe motion sickness with nausea, vomiting and dizziness. He was prescribed a scopolamine patch which he states did not help him. Patient inquiring about medications he use instead.

## 2024-08-13 NOTE — PLAN
[FreeTextEntry1] : #Motion sickness Patient traveling on a cruise Previously experienced motion sickness Has tried scopolamine patch which was not effective - recommended OTC Dramamine or meclizine  Case discussed with Dr Aranda

## 2024-08-13 NOTE — HISTORY OF PRESENT ILLNESS
[Home] : at home, [unfilled] , at the time of the visit. [Medical Office: ([facility])___] : at the medical office located in  [Verbal consent obtained from patient] : the patient, [unfilled] [FreeTextEntry1] : Medication request [de-identified] : 39yoM with Factor V Leiden, h/o PE x 2 s/p thrombectomy 12/2020, on Eliquis (lifelong AC), DM diagnosed in Jan 2024 presents for Telemedicine appointment with regards to motion sickness medications. Patient will be on a cruise in the next few weeks. On his last cruise, patient experienced severe motion sickness with nausea, vomiting and dizziness. He was prescribed a scopolamine patch which he states did not help him. Patient inquiring about medications he use instead.

## 2024-08-21 ENCOUNTER — APPOINTMENT (OUTPATIENT)
Dept: PULMONOLOGY | Facility: CLINIC | Age: 39
End: 2024-08-21

## 2024-08-21 NOTE — ASSESSMENT
[FreeTextEntry1] : History of saddle embolism with cor pulmonale DVT and right heart failure with pulmonary hypertension  The patient is compliant with the anticoagulation.  The patient emergency surgery which required inferior vena cava filter which was removed.  During her hospitalization the patient had a chest exam was unremarkable and echocardiogram revealed pulmonary hypertension which is higher than the baseline from previously at rest.  Patient is clinically stable.  There is no clinical evidence neither failure of therapy no bleeding complication.  There is no limitation of exercise capacity.  The pulmonary hypertension on the echocardiogram could be related to the sleep apnea could be related, chronic thromboembolic disease.  The last VQ scan revealed some chronic mismatching defect.  Will follow-up on repeat VQ scan and compared to the previous 1 and sleep study.  The patient might require metabolic exercise test.  The PFT  Snoring  I discussed the short and long term health effect of the obstructive seep apnea with the patient. These effects include, but not limited to, uncontrolled hypertension, CAD, arrhythmias, sudden death, CVA, and pulmonary hypertension. I advised the patient to avoid sedatives, narcotics, driving, and sleeping pills in the meantime. I discussed the therapeutic options including but not limited to CPAP, surgery, and oral appliance. Further recommendations will follow after sleep study.

## 2024-08-21 NOTE — HISTORY OF PRESENT ILLNESS
[Never] : never [TextBox_4] : Is doing okay.  He developed appendicitis in January.  He was admitted to [facility].  He had to have a filter done and had the surgery which was complicated.  Anyway the he did well and the filter was taken out.  He is compliant with the Eliquis.  No bleeding no shortness of breath.  He snores at night. [Awakes Unrefreshed] : awakes unrefreshed [Awakes with Dry Mouth] : does not awaken with dry mouth [Awakes with Headache] : does not awaken with headache [Daytime Somnolence] : denies daytime somnolence [Difficulty Initiating Sleep] : does not have difficulty initiating sleep [Fatigue] : fatigue [Snoring] : snoring [ESS] : 9

## 2024-08-21 NOTE — PROCEDURE
[FreeTextEntry1] : CONCLUSIONS:  1. Normal left ventricular size and systolic function. 2. Mildly dilated right ventricular size. 3. Normal right ventricular systolic function. 4. No significant valvular disease. 5. Pulmonary hypertension present, pulmonary artery systolic pressure is 49 mmHg. 6. No pericardial effusion.  CXR was unremarkable

## 2024-09-13 ENCOUNTER — APPOINTMENT (OUTPATIENT)
Dept: PULMONOLOGY | Facility: CLINIC | Age: 39
End: 2024-09-13

## 2024-09-13 DIAGNOSIS — I27.20 PULMONARY HYPERTENSION, UNSPECIFIED: ICD-10-CM

## 2024-09-13 DIAGNOSIS — I26.02 SADDLE EMBOLUS OF PULMONARY ARTERY WITH ACUTE COR PULMONALE: ICD-10-CM

## 2024-09-13 DIAGNOSIS — I82.413 ACUTE EMBOLISM AND THROMBOSIS OF FEMORAL VEIN, BILATERAL: ICD-10-CM

## 2024-09-13 DIAGNOSIS — G47.33 OBSTRUCTIVE SLEEP APNEA (ADULT) (PEDIATRIC): ICD-10-CM

## 2024-09-13 DIAGNOSIS — R06.83 SNORING: ICD-10-CM

## 2024-09-13 PROCEDURE — 99443: CPT

## 2024-09-16 PROBLEM — G47.33 OSA (OBSTRUCTIVE SLEEP APNEA): Status: ACTIVE | Noted: 2024-09-16

## 2024-09-16 NOTE — HISTORY OF PRESENT ILLNESS
[Home] : at home, [unfilled] , at the time of the visit. [Medical Office: ([facility])___] : at the medical office located in  [Verbal consent obtained from patient] : the patient, [unfilled] [Never] : never [TextBox_4] : 39 yr old male with PMH DVT, PE, pulmonary HTN.  Present today via telehealth visit.  He is doing well.  No significant change in his condition.  Hi recent sleep study shows signs of Mild MARY ALICE.  He has started on DM injectable medication, which is helping withe makeda loss.  Lost 10 lbs. [ESS] : 0

## 2024-09-16 NOTE — ASSESSMENT
[FreeTextEntry1] :   History of saddle embolism with cor pulmonale DVT and right heart failure with pulmonary hypertension  The patient is compliant with the anticoagulation.  Echocardiogram revealed pulmonary hypertension which is higher than the baseline from previously at rest. Patient is clinically stable. There is no clinical evidence neither failure of therapy no bleeding complication. There is no limitation of exercise capacity. The pulmonary hypertension on the echocardiogram could be related to the mild sleep apnea could be related, chronic thromboembolic disease.  VQ scan did not reveal any change compared to the previous 1 from April 2023. Continue anticoagulation.  The patient has chronic thromboembolic disease.  MARY ALICE  Sleep study reviewed mild MARY ALICE. Pt is asymptomatic.  Currently on DM injectable meds and losing weight, lost 10 lbs already.  Will hold on CPAP for now continue weight loss.  Avoid ETOH, sedatives or sleeping on back.  Repeat sleep study after weight loss.

## 2024-09-16 NOTE — HISTORY OF PRESENT ILLNESS
[Home] : at home, [unfilled] , at the time of the visit. [Medical Office: ([facility])___] : at the medical office located in  [Verbal consent obtained from patient] : the patient, [unfilled] [Never] : never [TextBox_4] : 39 yr old male with PMH DVT, PE, pulmonary HTN.  Present today via telehealth visit.  He is doing well.  No significant change in his condition.  Hi recent sleep study shows signs of Mild MARY ALICE.  He has started on DM injectable medication, which is helping withe makeda loss.  Lost 10 lbs. [ESS] : 0

## 2024-10-16 RX ORDER — TIRZEPATIDE 5 MG/.5ML
5 INJECTION, SOLUTION SUBCUTANEOUS
Qty: 6 | Refills: 1 | Status: ACTIVE | COMMUNITY
Start: 2024-10-16 | End: 1900-01-01

## 2024-11-05 ENCOUNTER — APPOINTMENT (OUTPATIENT)
Dept: ENDOCRINOLOGY | Facility: CLINIC | Age: 39
End: 2024-11-05
Payer: COMMERCIAL

## 2024-11-05 VITALS
DIASTOLIC BLOOD PRESSURE: 73 MMHG | HEART RATE: 90 BPM | WEIGHT: 296 LBS | SYSTOLIC BLOOD PRESSURE: 117 MMHG | BODY MASS INDEX: 40.15 KG/M2

## 2024-11-05 DIAGNOSIS — E11.9 TYPE 2 DIABETES MELLITUS W/OUT COMPLICATIONS: ICD-10-CM

## 2024-11-05 LAB
GLUCOSE BLDC GLUCOMTR-MCNC: 138
HBA1C MFR BLD HPLC: 6.5

## 2024-11-05 PROCEDURE — 36415 COLL VENOUS BLD VENIPUNCTURE: CPT | Mod: 59

## 2024-11-05 PROCEDURE — 99214 OFFICE O/P EST MOD 30 MIN: CPT | Mod: 25

## 2024-11-05 PROCEDURE — 82962 GLUCOSE BLOOD TEST: CPT

## 2024-11-05 PROCEDURE — 83036 HEMOGLOBIN GLYCOSYLATED A1C: CPT | Mod: QW

## 2024-11-07 LAB
ALBUMIN SERPL ELPH-MCNC: 4.6 G/DL
ALP BLD-CCNC: 91 U/L
ALT SERPL-CCNC: 45 U/L
ANION GAP SERPL CALC-SCNC: 14 MMOL/L
AST SERPL-CCNC: 26 U/L
BILIRUB SERPL-MCNC: 0.3 MG/DL
BUN SERPL-MCNC: 11 MG/DL
CALCIUM SERPL-MCNC: 9.7 MG/DL
CHLORIDE SERPL-SCNC: 100 MMOL/L
CHOLEST SERPL-MCNC: 147 MG/DL
CO2 SERPL-SCNC: 24 MMOL/L
CREAT SERPL-MCNC: 0.76 MG/DL
EGFR: 117 ML/MIN/1.73M2
GLUCOSE SERPL-MCNC: 142 MG/DL
HDLC SERPL-MCNC: 42 MG/DL
LDLC SERPL CALC-MCNC: 85 MG/DL
NONHDLC SERPL-MCNC: 105 MG/DL
POTASSIUM SERPL-SCNC: 4.1 MMOL/L
PROT SERPL-MCNC: 7.2 G/DL
SODIUM SERPL-SCNC: 138 MMOL/L
TRIGL SERPL-MCNC: 106 MG/DL
VIT B12 SERPL-MCNC: 1061 PG/ML

## 2024-12-13 ENCOUNTER — APPOINTMENT (OUTPATIENT)
Dept: SURGERY | Facility: CLINIC | Age: 39
End: 2024-12-13
Payer: COMMERCIAL

## 2024-12-13 VITALS
TEMPERATURE: 97 F | DIASTOLIC BLOOD PRESSURE: 76 MMHG | OXYGEN SATURATION: 99 % | WEIGHT: 292 LBS | HEIGHT: 72 IN | HEART RATE: 76 BPM | BODY MASS INDEX: 39.55 KG/M2 | SYSTOLIC BLOOD PRESSURE: 115 MMHG

## 2024-12-13 DIAGNOSIS — K42.9 UMBILICAL HERNIA W/OUT OBSTRUCTION OR GANGRENE: ICD-10-CM

## 2024-12-13 PROCEDURE — 99214 OFFICE O/P EST MOD 30 MIN: CPT

## 2024-12-17 PROBLEM — K42.9 UMBILICAL HERNIA: Status: ACTIVE | Noted: 2024-12-17

## 2025-01-07 ENCOUNTER — NON-APPOINTMENT (OUTPATIENT)
Age: 40
End: 2025-01-07

## 2025-02-18 ENCOUNTER — APPOINTMENT (OUTPATIENT)
Dept: ENDOCRINOLOGY | Facility: CLINIC | Age: 40
End: 2025-02-18
Payer: COMMERCIAL

## 2025-02-18 VITALS
SYSTOLIC BLOOD PRESSURE: 105 MMHG | DIASTOLIC BLOOD PRESSURE: 72 MMHG | HEART RATE: 81 BPM | BODY MASS INDEX: 40.28 KG/M2 | WEIGHT: 297 LBS

## 2025-02-18 DIAGNOSIS — E11.9 TYPE 2 DIABETES MELLITUS W/OUT COMPLICATIONS: ICD-10-CM

## 2025-02-18 LAB
GLUCOSE BLDC GLUCOMTR-MCNC: 135
HBA1C MFR BLD HPLC: 6.5

## 2025-02-18 PROCEDURE — 99213 OFFICE O/P EST LOW 20 MIN: CPT

## 2025-02-18 PROCEDURE — 83036 HEMOGLOBIN GLYCOSYLATED A1C: CPT | Mod: QW

## 2025-02-18 PROCEDURE — 82962 GLUCOSE BLOOD TEST: CPT

## 2025-02-19 LAB
CREAT SPEC-SCNC: 76 MG/DL
MICROALBUMIN 24H UR DL<=1MG/L-MCNC: <1.2 MG/DL
MICROALBUMIN/CREAT 24H UR-RTO: NORMAL MG/G

## 2025-03-10 NOTE — CONSULT NOTE ADULT - NS ATTEND OPT1 GEN_ALL_CORE
-- DO NOT REPLY / DO NOT REPLY ALL --  -- This inbox is not monitored. If this was sent to the wrong provider or department, reroute message to P ECO Reroute pool. --  -- Message is from Engagement Center Operations (ECO) --    Patient called for status of Mounjaro.  Please call [facility] [employer] at [phone].  Patient will be out of the medication after 3/11/25.             I attest my time as attending is greater than 50% of the total combined time spent on qualifying patient care activities by the PA/NP and attending.

## 2025-03-17 ENCOUNTER — APPOINTMENT (OUTPATIENT)
Dept: SURGERY | Facility: CLINIC | Age: 40
End: 2025-03-17
Payer: COMMERCIAL

## 2025-03-17 ENCOUNTER — NON-APPOINTMENT (OUTPATIENT)
Age: 40
End: 2025-03-17

## 2025-03-17 VITALS — TEMPERATURE: 98 F | DIASTOLIC BLOOD PRESSURE: 77 MMHG | SYSTOLIC BLOOD PRESSURE: 115 MMHG | HEART RATE: 81 BPM

## 2025-03-17 DIAGNOSIS — K42.9 UMBILICAL HERNIA W/OUT OBSTRUCTION OR GANGRENE: ICD-10-CM

## 2025-03-17 PROCEDURE — 99214 OFFICE O/P EST MOD 30 MIN: CPT

## 2025-03-17 RX ORDER — EMTRICITABINE AND TENOFOVIR DISOPROXIL FUMARATE 167; 250 MG/1; MG/1
TABLET, FILM COATED ORAL
Refills: 0 | Status: ACTIVE | COMMUNITY

## 2025-03-25 ENCOUNTER — NON-APPOINTMENT (OUTPATIENT)
Age: 40
End: 2025-03-25

## 2025-04-01 ENCOUNTER — APPOINTMENT (OUTPATIENT)
Dept: FAMILY MEDICINE | Facility: CLINIC | Age: 40
End: 2025-04-01

## 2025-04-02 ENCOUNTER — RESULT REVIEW (OUTPATIENT)
Age: 40
End: 2025-04-02

## 2025-04-03 ENCOUNTER — RESULT REVIEW (OUTPATIENT)
Age: 40
End: 2025-04-03

## 2025-04-03 ENCOUNTER — APPOINTMENT (OUTPATIENT)
Dept: PULMONOLOGY | Facility: CLINIC | Age: 40
End: 2025-04-03
Payer: COMMERCIAL

## 2025-04-03 VITALS
SYSTOLIC BLOOD PRESSURE: 111 MMHG | HEART RATE: 66 BPM | WEIGHT: 290 LBS | DIASTOLIC BLOOD PRESSURE: 72 MMHG | RESPIRATION RATE: 16 BRPM | TEMPERATURE: 97.7 F | HEIGHT: 72 IN | BODY MASS INDEX: 39.28 KG/M2 | OXYGEN SATURATION: 99 %

## 2025-04-03 DIAGNOSIS — I26.02 SADDLE EMBOLUS OF PULMONARY ARTERY WITH ACUTE COR PULMONALE: ICD-10-CM

## 2025-04-03 DIAGNOSIS — I27.20 PULMONARY HYPERTENSION, UNSPECIFIED: ICD-10-CM

## 2025-04-03 DIAGNOSIS — R06.83 SNORING: ICD-10-CM

## 2025-04-03 DIAGNOSIS — I82.413 ACUTE EMBOLISM AND THROMBOSIS OF FEMORAL VEIN, BILATERAL: ICD-10-CM

## 2025-04-03 DIAGNOSIS — G47.33 OBSTRUCTIVE SLEEP APNEA (ADULT) (PEDIATRIC): ICD-10-CM

## 2025-04-03 DIAGNOSIS — Z86.718 PERSONAL HISTORY OF OTHER VENOUS THROMBOSIS AND EMBOLISM: ICD-10-CM

## 2025-04-03 PROCEDURE — G2211 COMPLEX E/M VISIT ADD ON: CPT | Mod: NC

## 2025-04-03 PROCEDURE — 99214 OFFICE O/P EST MOD 30 MIN: CPT

## 2025-04-08 ENCOUNTER — RESULT REVIEW (OUTPATIENT)
Age: 40
End: 2025-04-08

## 2025-04-08 ENCOUNTER — TRANSCRIPTION ENCOUNTER (OUTPATIENT)
Age: 40
End: 2025-04-08

## 2025-04-08 ENCOUNTER — APPOINTMENT (OUTPATIENT)
Dept: SURGERY | Facility: HOSPITAL | Age: 40
End: 2025-04-08

## 2025-04-09 ENCOUNTER — NON-APPOINTMENT (OUTPATIENT)
Age: 40
End: 2025-04-09

## 2025-04-15 ENCOUNTER — RESULT REVIEW (OUTPATIENT)
Age: 40
End: 2025-04-15

## 2025-04-15 ENCOUNTER — APPOINTMENT (OUTPATIENT)
Dept: SURGERY | Facility: CLINIC | Age: 40
End: 2025-04-15

## 2025-04-17 ENCOUNTER — NON-APPOINTMENT (OUTPATIENT)
Age: 40
End: 2025-04-17

## 2025-04-21 ENCOUNTER — TRANSCRIPTION ENCOUNTER (OUTPATIENT)
Age: 40
End: 2025-04-21

## 2025-04-24 ENCOUNTER — APPOINTMENT (OUTPATIENT)
Dept: SURGERY | Facility: CLINIC | Age: 40
End: 2025-04-24
Payer: COMMERCIAL

## 2025-04-24 VITALS — HEART RATE: 99 BPM | TEMPERATURE: 98 F | SYSTOLIC BLOOD PRESSURE: 122 MMHG | DIASTOLIC BLOOD PRESSURE: 83 MMHG

## 2025-04-24 DIAGNOSIS — K43.2 INCISIONAL HERNIA W/OUT OBSTRUCTION OR GANGRENE: ICD-10-CM

## 2025-04-24 PROCEDURE — 99024 POSTOP FOLLOW-UP VISIT: CPT

## 2025-04-28 ENCOUNTER — NON-APPOINTMENT (OUTPATIENT)
Age: 40
End: 2025-04-28

## 2025-05-19 ENCOUNTER — APPOINTMENT (OUTPATIENT)
Dept: SURGERY | Facility: CLINIC | Age: 40
End: 2025-05-19
Payer: COMMERCIAL

## 2025-05-19 VITALS — TEMPERATURE: 98 F | SYSTOLIC BLOOD PRESSURE: 122 MMHG | HEART RATE: 67 BPM | DIASTOLIC BLOOD PRESSURE: 80 MMHG

## 2025-05-19 DIAGNOSIS — K43.2 INCISIONAL HERNIA W/OUT OBSTRUCTION OR GANGRENE: ICD-10-CM

## 2025-05-19 DIAGNOSIS — M25.512 PAIN IN LEFT SHOULDER: ICD-10-CM

## 2025-05-19 PROCEDURE — 99024 POSTOP FOLLOW-UP VISIT: CPT

## 2025-05-23 ENCOUNTER — APPOINTMENT (OUTPATIENT)
Dept: ENDOCRINOLOGY | Facility: CLINIC | Age: 40
End: 2025-05-23

## 2025-05-23 DIAGNOSIS — E11.9 TYPE 2 DIABETES MELLITUS W/OUT COMPLICATIONS: ICD-10-CM

## 2025-05-23 PROCEDURE — 99213 OFFICE O/P EST LOW 20 MIN: CPT | Mod: 93

## 2025-08-19 ENCOUNTER — APPOINTMENT (OUTPATIENT)
Dept: FAMILY MEDICINE | Facility: CLINIC | Age: 40
End: 2025-08-19

## (undated) DEVICE — SUT PDO 0 1/2 CIRCLE 22MM NDL 20CM

## (undated) DEVICE — WARMING BLANKET UPPER ADULT

## (undated) DEVICE — TIP METZENBAUM SCISSOR MONOPOLAR ENDOCUT (ORANGE)

## (undated) DEVICE — DRSG CURITY GAUZE SPONGE 4 X 4" 12-PLY

## (undated) DEVICE — D HELP - CLEARVIEW CLEARIFY SYSTEM

## (undated) DEVICE — DRSG DERMABOND 0.7ML

## (undated) DEVICE — VENODYNE/SCD SLEEVE CALF MEDIUM

## (undated) DEVICE — ENDOCATCH 10MM SPECIMEN POUCH

## (undated) DEVICE — XI TIP COVER

## (undated) DEVICE — ELCTR BOVIE PENCIL HANDPIECE ROCKER SWITCH 15FT

## (undated) DEVICE — PORT LP ACCESS AND OBTURATOR W BLADELESS OPT TIP 12X120MM

## (undated) DEVICE — XI ENDOWRIST 12 - 8 MM CANNULA REDUCER

## (undated) DEVICE — TORQUE DEVICE FOR GUIDEWIRE 0.0100.038"

## (undated) DEVICE — TROCAR SURGIQUEST AIRSEAL 12MMX100MM

## (undated) DEVICE — INSUFFLATION NDL COVIDIEN SURGINEEDLE VERESS 150MM LONG

## (undated) DEVICE — ENDOCATCH II 15MM

## (undated) DEVICE — Device

## (undated) DEVICE — SUT MONOCRYL 4-0 27" PS-2 UNDYED

## (undated) DEVICE — BLADE SCALPEL SAFETYLOCK #15

## (undated) DEVICE — XI DRAPE COLUMN

## (undated) DEVICE — TUBING STRYKER PNEUMOCLEAR HEAT HUMID

## (undated) DEVICE — SUT VLOC 180 2-0 9" GS-22 GREEN

## (undated) DEVICE — TUBING AIRSEAL TRI-LUMEN FILTERED

## (undated) DEVICE — STAPLER COVIDIEN ENDO GIA STANDARD HANDLE

## (undated) DEVICE — XI 12MM AND STAPLER CANNULA SEAL

## (undated) DEVICE — DRAPE X-DRAPE W CUT OUT 12X17" .25MM LEAD

## (undated) DEVICE — XI STAPLER SUREFORM 60

## (undated) DEVICE — SUT VICRYL 3-0 27" SH

## (undated) DEVICE — PACK PERI GYN

## (undated) DEVICE — PACK GENERAL LAPAROSCOPY

## (undated) DEVICE — XI SEAL UNIV 5- 8 MM

## (undated) DEVICE — DRSG TEGADERM 4X4.75"

## (undated) DEVICE — XI VESSEL SEALER

## (undated) DEVICE — GLV 7.5 PROTEXIS (WHITE)

## (undated) DEVICE — PACK ANGIOGRAM LNX SURGICOUNT

## (undated) DEVICE — MARKING PEN W RULER

## (undated) DEVICE — XI OBTURATOR OPTICAL BLADELESS 8MM

## (undated) DEVICE — XI ENDOWRIST SUCTION IRRIGATOR 8MM

## (undated) DEVICE — SUT VICRYL 0 27" UR-6

## (undated) DEVICE — INSUFFLATION NDL COVIDIEN SURGINEEDLE VERESS 120MM

## (undated) DEVICE — XI DRAPE ARM

## (undated) DEVICE — SYR LUER LOK 30CC